# Patient Record
Sex: MALE | Race: WHITE | NOT HISPANIC OR LATINO | URBAN - METROPOLITAN AREA
[De-identification: names, ages, dates, MRNs, and addresses within clinical notes are randomized per-mention and may not be internally consistent; named-entity substitution may affect disease eponyms.]

---

## 2018-04-18 ENCOUNTER — INPATIENT (INPATIENT)
Facility: HOSPITAL | Age: 78
LOS: 7 days | Discharge: HOME | DRG: 270 | End: 2018-04-26
Attending: HOSPITALIST | Admitting: INTERNAL MEDICINE
Payer: MEDICARE

## 2018-04-18 ENCOUNTER — EMERGENCY (EMERGENCY)
Facility: HOSPITAL | Age: 78
LOS: 1 days | Discharge: ANOTHER HOSPITAL | End: 2018-04-18
Attending: EMERGENCY MEDICINE
Payer: MEDICAID

## 2018-04-18 VITALS — WEIGHT: 139.99 LBS | HEIGHT: 67 IN

## 2018-04-18 VITALS
WEIGHT: 149.91 LBS | OXYGEN SATURATION: 91 % | HEIGHT: 67 IN | RESPIRATION RATE: 18 BRPM | HEART RATE: 104 BPM | TEMPERATURE: 99 F

## 2018-04-18 VITALS
DIASTOLIC BLOOD PRESSURE: 77 MMHG | RESPIRATION RATE: 18 BRPM | SYSTOLIC BLOOD PRESSURE: 122 MMHG | HEART RATE: 112 BPM | OXYGEN SATURATION: 99 %

## 2018-04-18 DIAGNOSIS — I21.3 ST ELEVATION (STEMI) MYOCARDIAL INFARCTION OF UNSPECIFIED SITE: ICD-10-CM

## 2018-04-18 DIAGNOSIS — Z98.890 OTHER SPECIFIED POSTPROCEDURAL STATES: Chronic | ICD-10-CM

## 2018-04-18 LAB
ALBUMIN SERPL ELPH-MCNC: 3.6 G/DL — SIGNIFICANT CHANGE UP (ref 3.5–5)
ALBUMIN SERPL ELPH-MCNC: 3.8 G/DL — SIGNIFICANT CHANGE UP (ref 3.3–5)
ALP SERPL-CCNC: 61 U/L — SIGNIFICANT CHANGE UP (ref 40–120)
ALP SERPL-CCNC: 64 U/L — SIGNIFICANT CHANGE UP (ref 40–120)
ALT FLD-CCNC: 139 U/L — HIGH (ref 10–45)
ALT FLD-CCNC: 93 U/L DA — HIGH (ref 10–60)
ANION GAP SERPL CALC-SCNC: 16 MMOL/L — SIGNIFICANT CHANGE UP (ref 5–17)
ANION GAP SERPL CALC-SCNC: 8 MMOL/L — SIGNIFICANT CHANGE UP (ref 5–17)
APTT BLD: 110.4 SEC — HIGH (ref 27.5–37.4)
APTT BLD: 27.1 SEC — LOW (ref 27.5–37.4)
APTT BLD: 41.7 SEC — HIGH (ref 27.5–37.4)
AST SERPL-CCNC: 673 U/L — HIGH (ref 10–40)
AST SERPL-CCNC: 992 U/L — HIGH (ref 10–40)
BASOPHILS # BLD AUTO: 0 K/UL — SIGNIFICANT CHANGE UP (ref 0–0.2)
BASOPHILS # BLD AUTO: 0 K/UL — SIGNIFICANT CHANGE UP (ref 0–0.2)
BASOPHILS NFR BLD AUTO: 0 % — SIGNIFICANT CHANGE UP (ref 0–2)
BASOPHILS NFR BLD AUTO: 0.3 % — SIGNIFICANT CHANGE UP (ref 0–2)
BILIRUB SERPL-MCNC: 0.6 MG/DL — SIGNIFICANT CHANGE UP (ref 0.2–1.2)
BILIRUB SERPL-MCNC: 0.6 MG/DL — SIGNIFICANT CHANGE UP (ref 0.2–1.2)
BUN SERPL-MCNC: 19 MG/DL — HIGH (ref 7–18)
BUN SERPL-MCNC: 19 MG/DL — SIGNIFICANT CHANGE UP (ref 7–23)
CALCIUM SERPL-MCNC: 8.5 MG/DL — SIGNIFICANT CHANGE UP (ref 8.4–10.5)
CALCIUM SERPL-MCNC: 9 MG/DL — SIGNIFICANT CHANGE UP (ref 8.4–10.5)
CHLORIDE SERPL-SCNC: 102 MMOL/L — SIGNIFICANT CHANGE UP (ref 96–108)
CHLORIDE SERPL-SCNC: 99 MMOL/L — SIGNIFICANT CHANGE UP (ref 96–108)
CK MB CFR SERPL CALC: 777.3 NG/ML — SIGNIFICANT CHANGE UP (ref 0–3.6)
CK MB CFR SERPL CALC: >600 NG/ML — SIGNIFICANT CHANGE UP (ref 0–6.7)
CK SERPL-CCNC: 4386 U/L — SIGNIFICANT CHANGE UP (ref 35–232)
CK SERPL-CCNC: 5655 U/L — HIGH (ref 30–200)
CK SERPL-CCNC: 9145 U/L — HIGH (ref 30–200)
CO2 SERPL-SCNC: 20 MMOL/L — LOW (ref 22–31)
CO2 SERPL-SCNC: 26 MMOL/L — SIGNIFICANT CHANGE UP (ref 22–31)
CREAT SERPL-MCNC: 0.96 MG/DL — SIGNIFICANT CHANGE UP (ref 0.5–1.3)
CREAT SERPL-MCNC: 1.12 MG/DL — SIGNIFICANT CHANGE UP (ref 0.5–1.3)
EOSINOPHIL # BLD AUTO: 0 K/UL — SIGNIFICANT CHANGE UP (ref 0–0.5)
EOSINOPHIL # BLD AUTO: 0.1 K/UL — SIGNIFICANT CHANGE UP (ref 0–0.5)
EOSINOPHIL NFR BLD AUTO: 0 % — SIGNIFICANT CHANGE UP (ref 0–6)
EOSINOPHIL NFR BLD AUTO: 0 % — SIGNIFICANT CHANGE UP (ref 0–6)
GLUCOSE SERPL-MCNC: 139 MG/DL — HIGH (ref 70–99)
GLUCOSE SERPL-MCNC: 139 MG/DL — HIGH (ref 70–99)
HCT VFR BLD CALC: 48.3 % — SIGNIFICANT CHANGE UP (ref 39–50)
HCT VFR BLD CALC: 50.8 % — HIGH (ref 39–50)
HGB BLD-MCNC: 16 G/DL — SIGNIFICANT CHANGE UP (ref 13–17)
HGB BLD-MCNC: 16.1 G/DL — SIGNIFICANT CHANGE UP (ref 13–17)
INR BLD: 1.04 RATIO — SIGNIFICANT CHANGE UP (ref 0.88–1.16)
INR BLD: 1.07 RATIO — SIGNIFICANT CHANGE UP (ref 0.88–1.16)
LYMPHOCYTES # BLD AUTO: 0.8 K/UL — LOW (ref 1–3.3)
LYMPHOCYTES # BLD AUTO: 0.8 K/UL — LOW (ref 1–3.3)
LYMPHOCYTES # BLD AUTO: 5 % — LOW (ref 13–44)
LYMPHOCYTES # BLD AUTO: 5.3 % — LOW (ref 13–44)
MAGNESIUM SERPL-MCNC: 1.8 MG/DL — SIGNIFICANT CHANGE UP (ref 1.6–2.6)
MAGNESIUM SERPL-MCNC: 2.1 MG/DL — SIGNIFICANT CHANGE UP (ref 1.6–2.6)
MCHC RBC-ENTMCNC: 30.4 PG — SIGNIFICANT CHANGE UP (ref 27–34)
MCHC RBC-ENTMCNC: 31.6 GM/DL — LOW (ref 32–36)
MCHC RBC-ENTMCNC: 32.2 PG — SIGNIFICANT CHANGE UP (ref 27–34)
MCHC RBC-ENTMCNC: 33.3 GM/DL — SIGNIFICANT CHANGE UP (ref 32–36)
MCV RBC AUTO: 96.3 FL — SIGNIFICANT CHANGE UP (ref 80–100)
MCV RBC AUTO: 96.5 FL — SIGNIFICANT CHANGE UP (ref 80–100)
MONOCYTES # BLD AUTO: 0.6 K/UL — SIGNIFICANT CHANGE UP (ref 0–0.9)
MONOCYTES # BLD AUTO: 1.2 K/UL — HIGH (ref 0–0.9)
MONOCYTES NFR BLD AUTO: 3 % — SIGNIFICANT CHANGE UP (ref 2–14)
MONOCYTES NFR BLD AUTO: 4.4 % — SIGNIFICANT CHANGE UP (ref 2–14)
NEUTROPHILS # BLD AUTO: 13 K/UL — HIGH (ref 1.8–7.4)
NEUTROPHILS # BLD AUTO: 18.6 K/UL — HIGH (ref 1.8–7.4)
NEUTROPHILS NFR BLD AUTO: 90 % — HIGH (ref 43–77)
NEUTROPHILS NFR BLD AUTO: 91 % — HIGH (ref 43–77)
PHOSPHATE SERPL-MCNC: 2.6 MG/DL — SIGNIFICANT CHANGE UP (ref 2.5–4.5)
PHOSPHATE SERPL-MCNC: 3.1 MG/DL — SIGNIFICANT CHANGE UP (ref 2.5–4.5)
PLATELET # BLD AUTO: 287 K/UL — SIGNIFICANT CHANGE UP (ref 150–400)
PLATELET # BLD AUTO: 308 K/UL — SIGNIFICANT CHANGE UP (ref 150–400)
POTASSIUM SERPL-MCNC: 3.9 MMOL/L — SIGNIFICANT CHANGE UP (ref 3.5–5.3)
POTASSIUM SERPL-MCNC: 3.9 MMOL/L — SIGNIFICANT CHANGE UP (ref 3.5–5.3)
POTASSIUM SERPL-SCNC: 3.9 MMOL/L — SIGNIFICANT CHANGE UP (ref 3.5–5.3)
POTASSIUM SERPL-SCNC: 3.9 MMOL/L — SIGNIFICANT CHANGE UP (ref 3.5–5.3)
PROT SERPL-MCNC: 6.6 G/DL — SIGNIFICANT CHANGE UP (ref 6–8.3)
PROT SERPL-MCNC: 7.3 G/DL — SIGNIFICANT CHANGE UP (ref 6–8.3)
PROTHROM AB SERPL-ACNC: 11.4 SEC — SIGNIFICANT CHANGE UP (ref 9.8–12.7)
PROTHROM AB SERPL-ACNC: 11.7 SEC — SIGNIFICANT CHANGE UP (ref 9.8–12.7)
RBC # BLD: 5.01 M/UL — SIGNIFICANT CHANGE UP (ref 4.2–5.8)
RBC # BLD: 5.28 M/UL — SIGNIFICANT CHANGE UP (ref 4.2–5.8)
RBC # FLD: 12.2 % — SIGNIFICANT CHANGE UP (ref 10.3–14.5)
RBC # FLD: 12.5 % — SIGNIFICANT CHANGE UP (ref 10.3–14.5)
SODIUM SERPL-SCNC: 135 MMOL/L — SIGNIFICANT CHANGE UP (ref 135–145)
SODIUM SERPL-SCNC: 136 MMOL/L — SIGNIFICANT CHANGE UP (ref 135–145)
TROPONIN I SERPL-MCNC: 97.7 NG/ML — SIGNIFICANT CHANGE UP (ref 0–0.04)
TROPONIN T SERPL-MCNC: 12.43 NG/ML — HIGH (ref 0–0.06)
TROPONIN T SERPL-MCNC: 20.09 NG/ML — HIGH (ref 0–0.06)
WBC # BLD: 13.9 K/UL — HIGH (ref 3.8–10.5)
WBC # BLD: 20.6 K/UL — HIGH (ref 3.8–10.5)
WBC # FLD AUTO: 13.9 K/UL — HIGH (ref 3.8–10.5)
WBC # FLD AUTO: 20.6 K/UL — HIGH (ref 3.8–10.5)

## 2018-04-18 PROCEDURE — 93010 ELECTROCARDIOGRAM REPORT: CPT

## 2018-04-18 PROCEDURE — 85610 PROTHROMBIN TIME: CPT

## 2018-04-18 PROCEDURE — 99291 CRITICAL CARE FIRST HOUR: CPT

## 2018-04-18 PROCEDURE — 33967 INSERT I-AORT PERCUT DEVICE: CPT

## 2018-04-18 PROCEDURE — 82550 ASSAY OF CK (CPK): CPT

## 2018-04-18 PROCEDURE — 99152 MOD SED SAME PHYS/QHP 5/>YRS: CPT

## 2018-04-18 PROCEDURE — 85027 COMPLETE CBC AUTOMATED: CPT

## 2018-04-18 PROCEDURE — 93005 ELECTROCARDIOGRAM TRACING: CPT

## 2018-04-18 PROCEDURE — 76937 US GUIDE VASCULAR ACCESS: CPT | Mod: 26

## 2018-04-18 PROCEDURE — 92941 PRQ TRLML REVSC TOT OCCL AMI: CPT | Mod: LD

## 2018-04-18 PROCEDURE — 96374 THER/PROPH/DIAG INJ IV PUSH: CPT

## 2018-04-18 PROCEDURE — 85730 THROMBOPLASTIN TIME PARTIAL: CPT

## 2018-04-18 PROCEDURE — 82962 GLUCOSE BLOOD TEST: CPT

## 2018-04-18 PROCEDURE — 71045 X-RAY EXAM CHEST 1 VIEW: CPT

## 2018-04-18 PROCEDURE — 71045 X-RAY EXAM CHEST 1 VIEW: CPT | Mod: 26,77

## 2018-04-18 PROCEDURE — 82553 CREATINE MB FRACTION: CPT

## 2018-04-18 PROCEDURE — 99291 CRITICAL CARE FIRST HOUR: CPT | Mod: 25

## 2018-04-18 PROCEDURE — 71045 X-RAY EXAM CHEST 1 VIEW: CPT | Mod: 26

## 2018-04-18 PROCEDURE — 84484 ASSAY OF TROPONIN QUANT: CPT

## 2018-04-18 PROCEDURE — 80053 COMPREHEN METABOLIC PANEL: CPT

## 2018-04-18 PROCEDURE — 93452 LEFT HRT CATH W/VENTRCLGRPHY: CPT | Mod: 26,59

## 2018-04-18 RX ORDER — HEPARIN SODIUM 5000 [USP'U]/ML
5500 INJECTION INTRAVENOUS; SUBCUTANEOUS EVERY 6 HOURS
Qty: 0 | Refills: 0 | Status: DISCONTINUED | OUTPATIENT
Start: 2018-04-18 | End: 2018-04-19

## 2018-04-18 RX ORDER — ISOSORBIDE DINITRATE 5 MG/1
10 TABLET ORAL THREE TIMES A DAY
Qty: 0 | Refills: 0 | Status: DISCONTINUED | OUTPATIENT
Start: 2018-04-18 | End: 2018-04-19

## 2018-04-18 RX ORDER — TICAGRELOR 90 MG/1
90 TABLET ORAL
Qty: 0 | Refills: 0 | Status: DISCONTINUED | OUTPATIENT
Start: 2018-04-18 | End: 2018-04-18

## 2018-04-18 RX ORDER — ASPIRIN/CALCIUM CARB/MAGNESIUM 324 MG
81 TABLET ORAL DAILY
Qty: 0 | Refills: 0 | Status: DISCONTINUED | OUTPATIENT
Start: 2018-04-19 | End: 2018-04-26

## 2018-04-18 RX ORDER — POTASSIUM CHLORIDE 20 MEQ
20 PACKET (EA) ORAL ONCE
Qty: 0 | Refills: 0 | Status: COMPLETED | OUTPATIENT
Start: 2018-04-18 | End: 2018-04-18

## 2018-04-18 RX ORDER — CLOPIDOGREL BISULFATE 75 MG/1
300 TABLET, FILM COATED ORAL ONCE
Qty: 0 | Refills: 0 | Status: COMPLETED | OUTPATIENT
Start: 2018-04-18 | End: 2018-04-18

## 2018-04-18 RX ORDER — TICAGRELOR 90 MG/1
90 TABLET ORAL
Qty: 0 | Refills: 0 | Status: DISCONTINUED | OUTPATIENT
Start: 2018-04-19 | End: 2018-04-20

## 2018-04-18 RX ORDER — MAGNESIUM SULFATE 500 MG/ML
1 VIAL (ML) INJECTION ONCE
Qty: 0 | Refills: 0 | Status: COMPLETED | OUTPATIENT
Start: 2018-04-18 | End: 2018-04-18

## 2018-04-18 RX ORDER — ATORVASTATIN CALCIUM 80 MG/1
20 TABLET, FILM COATED ORAL AT BEDTIME
Qty: 0 | Refills: 0 | Status: DISCONTINUED | OUTPATIENT
Start: 2018-04-18 | End: 2018-04-18

## 2018-04-18 RX ORDER — NITROGLYCERIN 6.5 MG
0.5 CAPSULE, EXTENDED RELEASE ORAL ONCE
Qty: 0 | Refills: 0 | Status: DISCONTINUED | OUTPATIENT
Start: 2018-04-18 | End: 2018-04-18

## 2018-04-18 RX ORDER — HEPARIN SODIUM 5000 [USP'U]/ML
INJECTION INTRAVENOUS; SUBCUTANEOUS
Qty: 25000 | Refills: 0 | Status: DISCONTINUED | OUTPATIENT
Start: 2018-04-18 | End: 2018-04-19

## 2018-04-18 RX ORDER — ATORVASTATIN CALCIUM 80 MG/1
80 TABLET, FILM COATED ORAL AT BEDTIME
Qty: 0 | Refills: 0 | Status: DISCONTINUED | OUTPATIENT
Start: 2018-04-18 | End: 2018-04-26

## 2018-04-18 RX ORDER — ASPIRIN/CALCIUM CARB/MAGNESIUM 324 MG
325 TABLET ORAL ONCE
Qty: 0 | Refills: 0 | Status: COMPLETED | OUTPATIENT
Start: 2018-04-18 | End: 2018-04-18

## 2018-04-18 RX ORDER — HEPARIN SODIUM 5000 [USP'U]/ML
2500 INJECTION INTRAVENOUS; SUBCUTANEOUS EVERY 6 HOURS
Qty: 0 | Refills: 0 | Status: DISCONTINUED | OUTPATIENT
Start: 2018-04-18 | End: 2018-04-19

## 2018-04-18 RX ORDER — HEPARIN SODIUM 5000 [USP'U]/ML
4000 INJECTION INTRAVENOUS; SUBCUTANEOUS ONCE
Qty: 0 | Refills: 0 | Status: COMPLETED | OUTPATIENT
Start: 2018-04-18 | End: 2018-04-18

## 2018-04-18 RX ORDER — NITROGLYCERIN 6.5 MG
0.4 CAPSULE, EXTENDED RELEASE ORAL ONCE
Qty: 0 | Refills: 0 | Status: COMPLETED | OUTPATIENT
Start: 2018-04-18 | End: 2018-04-18

## 2018-04-18 RX ORDER — NITROGLYCERIN 6.5 MG
10 CAPSULE, EXTENDED RELEASE ORAL
Qty: 50 | Refills: 0 | Status: DISCONTINUED | OUTPATIENT
Start: 2018-04-18 | End: 2018-04-18

## 2018-04-18 RX ORDER — FUROSEMIDE 40 MG
20 TABLET ORAL ONCE
Qty: 0 | Refills: 0 | Status: COMPLETED | OUTPATIENT
Start: 2018-04-18 | End: 2018-04-18

## 2018-04-18 RX ORDER — ISOSORBIDE DINITRATE 5 MG/1
10 TABLET ORAL THREE TIMES A DAY
Qty: 0 | Refills: 0 | Status: DISCONTINUED | OUTPATIENT
Start: 2018-04-18 | End: 2018-04-18

## 2018-04-18 RX ADMIN — Medication 0.4 MILLIGRAM(S): at 11:39

## 2018-04-18 RX ADMIN — Medication 100 GRAM(S): at 18:20

## 2018-04-18 RX ADMIN — CLOPIDOGREL BISULFATE 300 MILLIGRAM(S): 75 TABLET, FILM COATED ORAL at 10:40

## 2018-04-18 RX ADMIN — Medication 20 MILLIEQUIVALENT(S): at 18:20

## 2018-04-18 RX ADMIN — HEPARIN SODIUM 4000 UNIT(S): 5000 INJECTION INTRAVENOUS; SUBCUTANEOUS at 10:40

## 2018-04-18 RX ADMIN — HEPARIN SODIUM 1200 UNIT(S)/HR: 5000 INJECTION INTRAVENOUS; SUBCUTANEOUS at 16:17

## 2018-04-18 RX ADMIN — Medication 325 MILLIGRAM(S): at 10:39

## 2018-04-18 RX ADMIN — HEPARIN SODIUM 1100 UNIT(S)/HR: 5000 INJECTION INTRAVENOUS; SUBCUTANEOUS at 23:56

## 2018-04-18 RX ADMIN — Medication 20 MILLIGRAM(S): at 16:16

## 2018-04-18 RX ADMIN — Medication 3 MICROGRAM(S)/MIN: at 16:16

## 2018-04-18 RX ADMIN — ATORVASTATIN CALCIUM 80 MILLIGRAM(S): 80 TABLET, FILM COATED ORAL at 23:16

## 2018-04-18 NOTE — ED PROVIDER NOTE - PROGRESS NOTE DETAILS
Spoke with cath lab nurse at [facility] immediately upon evaluation of Pt.  EKG faxed there per their request and medications ordered and initiated, ASA, Plavix, Heparin bolus.  Awaiting call back for acceptance of transfer. Pt accepted to cath lab for emergent PCI by Dr. Rees.  Discussion with cath lab nurse Paola.  Pt stable for transfer.

## 2018-04-18 NOTE — H&P ADULT - HISTORY OF PRESENT ILLNESS
76 yo M no past medical history who presents with chest pain 76 yo M no past medical history who presents with chest pain at [place] and was transferred to [facility] for STEMI.     His pain began last night around 5-6 pm. He had associated nausea earlier that day and yesterday evening. His pain was constant and began as 5/10 and progressed to 8/10. He never had this pain before. He noticed this pain had started with walking. It did not radiate and felt like pressure and throbbing. He vomited last night. He denied any palpitations. He took aspirin without relief. This pain persisted and he went to the ED at [place] this morning.     He was noted to have ST elevations from V1-V5 with mild elevations in I/avL. He was given ASA and plavix 300 with heparin bolus. He was then transferred to [facility] for cath. He had his cardiac cath performed with 100% proximal LAD s/p KYRA x1/POBA, ramus was 90% occluded and D1 was 90%. He was noted to be hypotensive to 79 SBP and had an IABP placed. LVEDP 28. He appeared to have decreased EF on ventriculogram.     On speaking with patient he was on 2 L NC. He still had 7/10 chest pain and felt short of breath. He had edema noted on repeat CXR.

## 2018-04-18 NOTE — H&P ADULT - NSHPSOCIALHISTORY_GEN_ALL_CORE
Denied any recent tobacco use. Previously smoked in college but quit as he participated in track. Drinks 2-3 glasses of wine/beer every other day. Denies any illcit drug use or second hand smoke. Works as an actor.

## 2018-04-18 NOTE — ED PROVIDER NOTE - CONDUCTED A DETAILED DISCUSSION WITH PATIENT AND/OR GUARDIAN REGARDING, MDM
radiology results/need for outpatient follow-up/need for transfer/lab results radiology results/lab results/need for transfer

## 2018-04-18 NOTE — CHART NOTE - NSCHARTNOTEFT_GEN_A_CORE
====================  CCU MIDNIGHT ROUNDS  ====================    LUNA COOPER  [id number]    ====================  SUMMARY: HPI:  78 yo M no past medical history who presents with chest pain at [place] and was transferred to [facility] for STEMI.     His pain began last night around 5-6 pm. He had associated nausea earlier that day and yesterday evening. His pain was constant and began as 5/10 and progressed to 8/10. He never had this pain before. He noticed this pain had started with walking. It did not radiate and felt like pressure and throbbing. He vomited last night. He denied any palpitations. He took aspirin without relief. This pain persisted and he went to the ED at [place] this morning.     He was noted to have ST elevations from V1-V5 with mild elevations in I/avL. He was given ASA and plavix 300 with heparin bolus. He was then transferred to [facility] for cath. He had his cardiac cath performed with 100% proximal LAD s/p KYRA x1/POBA, ramus was 90% occluded and D1 was 90%. He was noted to be hypotensive to 79 SBP and had an IABP placed. LVEDP 28. He appeared to have decreased EF on ventriculogram.     On speaking with patient he was on 2 L NC. He still had 7/10 chest pain and felt short of breath. He had edema noted on repeat CXR. (18 Apr 2018 15:15)    ====================        ====================  NEW EVENTS: Nitro gtt weened off.  ====================        ====================  VITALS (Last 12 hrs):  ====================    T(C): 37.8 (04-18-18 @ 19:00), Max: 37.8 (04-18-18 @ 19:00)  HR: 110 (04-18-18 @ 22:00) (102 - 114)  BP: 100/61 (04-18-18 @ 19:00) (100/61 - 100/61)  BP(mean): 77 (04-18-18 @ 19:00) (77 - 77)  RR: 21 (04-18-18 @ 22:00) (16 - 21)  SpO2: 95% (04-18-18 @ 22:00) (91% - 97%)      TELEMETRY: sinus tach        I&O's Summary    18 Apr 2018 07:01  -  18 Apr 2018 23:10  --------------------------------------------------------  IN: 177 mL / OUT: 525 mL / NET: -348 mL        ====================  PLAN:  #cardiac: STEMI s/p PCI to LAD with IABP placement. IABP on 1:1 augmenting high 90s low 100s. On heparin gtt, DAPT, statin, isordil for afterload reduction. Nitro gtt has been weened off and pt c/o minimal chest discomfort. Will need stage eventually.  ====================    HEALTH ISSUES - PROBLEM Dx:        Teri Russell, CCU PA #[id number]

## 2018-04-18 NOTE — H&P ADULT - NSHPPHYSICALEXAM_GEN_ALL_CORE
ICU Vital Signs Last 24 Hrs  T(C): 37.1 (18 Apr 2018 13:30), Max: 37.1 (18 Apr 2018 10:25)  T(F): 98.7 (18 Apr 2018 13:30), Max: 98.8 (18 Apr 2018 10:25)  HR: 114 (18 Apr 2018 16:00) (102 - 114)  BP: 122/77 (18 Apr 2018 10:47) (122/77 - 132/84)  BP(mean): --  ABP: --  ABP(mean): --  RR: 18 (18 Apr 2018 13:30) (17 - 18)  SpO2: 96% (18 Apr 2018 16:00) (91% - 99%) ICU Vital Signs Last 24 Hrs  T(C): 37.1 (18 Apr 2018 13:30), Max: 37.1 (18 Apr 2018 10:25)  T(F): 98.7 (18 Apr 2018 13:30), Max: 98.8 (18 Apr 2018 10:25)  HR: 114 (18 Apr 2018 16:00) (102 - 114)  BP: 122/77 (18 Apr 2018 10:47) (122/77 - 132/84)  BP(mean): --  ABP: --  ABP(mean): --  RR: 18 (18 Apr 2018 13:30) (17 - 18)  SpO2: 96% (18 Apr 2018 16:00) (91% - 99%)    General: NAD, resting comfortably in bed  Neck: No appreciable JVD  Lung: CTAB in anterior lung fields,  CV: Tachycardic, physiologic split S2, no murmurs, gallops or rubs  GI: Soft, NT, ND, no guarding or rigidity  : No glass  Ext: R groin intact with minimal bleeding surrounding entry site of IABP  Vasc: Pulses noted on R lower extremity  Neuro: Alert, awake, appropriate  Psych: Normal affect

## 2018-04-18 NOTE — ED ADULT NURSE NOTE - CHPI ED SYMPTOMS NEG
no fever/no shortness of breath/no back pain/no vomiting/no nausea/no diaphoresis/no cough/no chills/no syncope/no dizziness

## 2018-04-18 NOTE — ED PROVIDER NOTE - OBJECTIVE STATEMENT
78 y/o M pt with no known PMHx and no known PSHx presents to ED c/o CP since last night (yesterday). Pt describes CP as tightness, and non-radiating; CP does not radiate to the back. Per pt, pt believes he has HTN, but does not take any medication for it. Pt denies b/l leg swelling, or any other complaints. Pt also denies having a PMD that he follows up with regularly, or taking any medications daily. NKDA. 78 y/o M pt with no known PMHx and no known PSHx presents to ED c/o CP since last night (yesterday). Pt describes CP as tightness, and non-radiating; CP does not radiate to the back. Per pt, pt believes he has HTN, but does not take any medication for it. Pt denies b/l leg swelling, or any other complaints. Pt also denies having a PMD that he follows up with regularly, or taking any medications daily. Denies smoking/drug use.  NKDA.

## 2018-04-18 NOTE — ED PROVIDER NOTE - MEDICAL DECISION MAKING DETAILS
76 y/o M pt c/o chest pain since last night. No apparent distress. EKG with signs of STEMI. Will initiate STAT transfer to [facility] Cath Lab.

## 2018-04-18 NOTE — H&P ADULT - NSHPLABSRESULTS_GEN_ALL_CORE
16.1   20.6  )-----------( 287      ( 18 Apr 2018 16:15 )             48.3     CBC Full  -  ( 18 Apr 2018 16:15 )  WBC Count : 20.6 K/uL  Hemoglobin : 16.1 g/dL  Hematocrit : 48.3 %  Platelet Count - Automated : 287 K/uL  Mean Cell Volume : 96.5 fl  Mean Cell Hemoglobin : 32.2 pg  Mean Cell Hemoglobin Concentration : 33.3 gm/dL  Auto Neutrophil # : 18.6 K/uL  Auto Lymphocyte # : 0.8 K/uL  Auto Monocyte # : 1.2 K/uL  Auto Eosinophil # : 0.1 K/uL  Auto Basophil # : 0.0 K/uL  Auto Neutrophil % : 91.0 %  Auto Lymphocyte % : 5.0 %  Auto Monocyte % : 3.0 %  Auto Eosinophil % : 0.0 %  Auto Basophil % : 0.0 %    04-18    135  |  99  |  19  ----------------------------<  139<H>  3.9   |  20<L>  |  0.96    Ca    9.0      18 Apr 2018 16:15  Phos  3.1     04-18  Mg     1.8     04-18    TPro  6.6  /  Alb  3.8  /  TBili  0.6  /  DBili  x   /  AST  992<H>  /  ALT  139<H>  /  AlkPhos  61  04-18    PT/INR - ( 18 Apr 2018 16:15 )   PT: 11.7 sec;   INR: 1.07 ratio         PTT - ( 18 Apr 2018 16:15 )  PTT:41.7 sec  CARDIAC MARKERS ( 18 Apr 2018 16:15 )  x     / 20.09 ng/mL / 9145 U/L / x     / >600 ng/mL  CARDIAC MARKERS ( 18 Apr 2018 10:35 )  97.7 ng/mL / x     / 4386 U/L / x     / 777.3 ng/mL

## 2018-04-18 NOTE — H&P ADULT - NSHPREVIEWOFSYSTEMS_GEN_ALL_CORE
General: Denied fevers or chills  Lungs: Shortness of breath  CV: Please see above  GI: No abdominal pain, nausea/vomiting resolved, no diarrhea/constipation  : No urinary difficulties  Ext: No lower extremity swelling  Skin: No rashes

## 2018-04-19 LAB
ALBUMIN SERPL ELPH-MCNC: 3.3 G/DL — SIGNIFICANT CHANGE UP (ref 3.3–5)
ALP SERPL-CCNC: 48 U/L — SIGNIFICANT CHANGE UP (ref 40–120)
ALT FLD-CCNC: 107 U/L — HIGH (ref 10–45)
ANION GAP SERPL CALC-SCNC: 13 MMOL/L — SIGNIFICANT CHANGE UP (ref 5–17)
APTT BLD: 124.2 SEC — CRITICAL HIGH (ref 27.5–37.4)
AST SERPL-CCNC: 603 U/L — HIGH (ref 10–40)
BASOPHILS # BLD AUTO: 0 K/UL — SIGNIFICANT CHANGE UP (ref 0–0.2)
BASOPHILS # BLD AUTO: 0 K/UL — SIGNIFICANT CHANGE UP (ref 0–0.2)
BASOPHILS NFR BLD AUTO: 0 % — SIGNIFICANT CHANGE UP (ref 0–2)
BASOPHILS NFR BLD AUTO: 0.1 % — SIGNIFICANT CHANGE UP (ref 0–2)
BILIRUB SERPL-MCNC: 0.8 MG/DL — SIGNIFICANT CHANGE UP (ref 0.2–1.2)
BUN SERPL-MCNC: 25 MG/DL — HIGH (ref 7–23)
CALCIUM SERPL-MCNC: 8.5 MG/DL — SIGNIFICANT CHANGE UP (ref 8.4–10.5)
CHLORIDE SERPL-SCNC: 99 MMOL/L — SIGNIFICANT CHANGE UP (ref 96–108)
CK MB BLD-MCNC: 5.5 % — HIGH (ref 0–3.5)
CK MB BLD-MCNC: 7.8 % — HIGH (ref 0–3.5)
CK MB CFR SERPL CALC: 219.4 NG/ML — HIGH (ref 0–6.7)
CK MB CFR SERPL CALC: 443.6 NG/ML — HIGH (ref 0–6.7)
CK SERPL-CCNC: 3971 U/L — HIGH (ref 30–200)
CO2 SERPL-SCNC: 23 MMOL/L — SIGNIFICANT CHANGE UP (ref 22–31)
CREAT SERPL-MCNC: 1.34 MG/DL — HIGH (ref 0.5–1.3)
EOSINOPHIL # BLD AUTO: 0 K/UL — SIGNIFICANT CHANGE UP (ref 0–0.5)
EOSINOPHIL # BLD AUTO: 0 K/UL — SIGNIFICANT CHANGE UP (ref 0–0.5)
EOSINOPHIL NFR BLD AUTO: 0.1 % — SIGNIFICANT CHANGE UP (ref 0–6)
EOSINOPHIL NFR BLD AUTO: 0.1 % — SIGNIFICANT CHANGE UP (ref 0–6)
GLUCOSE SERPL-MCNC: 153 MG/DL — HIGH (ref 70–99)
HCT VFR BLD CALC: 41.8 % — SIGNIFICANT CHANGE UP (ref 39–50)
HCT VFR BLD CALC: 44.9 % — SIGNIFICANT CHANGE UP (ref 39–50)
HGB BLD-MCNC: 14.2 G/DL — SIGNIFICANT CHANGE UP (ref 13–17)
HGB BLD-MCNC: 15 G/DL — SIGNIFICANT CHANGE UP (ref 13–17)
INR BLD: 1.36 RATIO — HIGH (ref 0.88–1.16)
LYMPHOCYTES # BLD AUTO: 0.6 K/UL — LOW (ref 1–3.3)
LYMPHOCYTES # BLD AUTO: 0.8 K/UL — LOW (ref 1–3.3)
LYMPHOCYTES # BLD AUTO: 3.2 % — LOW (ref 13–44)
LYMPHOCYTES # BLD AUTO: 4.9 % — LOW (ref 13–44)
MAGNESIUM SERPL-MCNC: 2.2 MG/DL — SIGNIFICANT CHANGE UP (ref 1.6–2.6)
MCHC RBC-ENTMCNC: 31.8 PG — SIGNIFICANT CHANGE UP (ref 27–34)
MCHC RBC-ENTMCNC: 32.5 PG — SIGNIFICANT CHANGE UP (ref 27–34)
MCHC RBC-ENTMCNC: 33.3 GM/DL — SIGNIFICANT CHANGE UP (ref 32–36)
MCHC RBC-ENTMCNC: 34 GM/DL — SIGNIFICANT CHANGE UP (ref 32–36)
MCV RBC AUTO: 95.4 FL — SIGNIFICANT CHANGE UP (ref 80–100)
MCV RBC AUTO: 95.6 FL — SIGNIFICANT CHANGE UP (ref 80–100)
MONOCYTES # BLD AUTO: 0.9 K/UL — SIGNIFICANT CHANGE UP (ref 0–0.9)
MONOCYTES # BLD AUTO: 1.1 K/UL — HIGH (ref 0–0.9)
MONOCYTES NFR BLD AUTO: 5.1 % — SIGNIFICANT CHANGE UP (ref 2–14)
MONOCYTES NFR BLD AUTO: 6.6 % — SIGNIFICANT CHANGE UP (ref 2–14)
NEUTROPHILS # BLD AUTO: 15 K/UL — HIGH (ref 1.8–7.4)
NEUTROPHILS # BLD AUTO: 16.8 K/UL — HIGH (ref 1.8–7.4)
NEUTROPHILS NFR BLD AUTO: 88.4 % — HIGH (ref 43–77)
NEUTROPHILS NFR BLD AUTO: 91.4 % — HIGH (ref 43–77)
PHOSPHATE SERPL-MCNC: 2.9 MG/DL — SIGNIFICANT CHANGE UP (ref 2.5–4.5)
PLATELET # BLD AUTO: 216 K/UL — SIGNIFICANT CHANGE UP (ref 150–400)
PLATELET # BLD AUTO: 255 K/UL — SIGNIFICANT CHANGE UP (ref 150–400)
POTASSIUM SERPL-MCNC: 3.9 MMOL/L — SIGNIFICANT CHANGE UP (ref 3.5–5.3)
POTASSIUM SERPL-SCNC: 3.9 MMOL/L — SIGNIFICANT CHANGE UP (ref 3.5–5.3)
PROT SERPL-MCNC: 6.2 G/DL — SIGNIFICANT CHANGE UP (ref 6–8.3)
PROTHROM AB SERPL-ACNC: 14.9 SEC — HIGH (ref 9.8–12.7)
RBC # BLD: 4.38 M/UL — SIGNIFICANT CHANGE UP (ref 4.2–5.8)
RBC # BLD: 4.71 M/UL — SIGNIFICANT CHANGE UP (ref 4.2–5.8)
RBC # FLD: 12.1 % — SIGNIFICANT CHANGE UP (ref 10.3–14.5)
RBC # FLD: 12.1 % — SIGNIFICANT CHANGE UP (ref 10.3–14.5)
SODIUM SERPL-SCNC: 135 MMOL/L — SIGNIFICANT CHANGE UP (ref 135–145)
TROPONIN T SERPL-MCNC: 9.82 NG/ML — HIGH (ref 0–0.06)
WBC # BLD: 17 K/UL — HIGH (ref 3.8–10.5)
WBC # BLD: 18.4 K/UL — HIGH (ref 3.8–10.5)
WBC # FLD AUTO: 17 K/UL — HIGH (ref 3.8–10.5)
WBC # FLD AUTO: 18.4 K/UL — HIGH (ref 3.8–10.5)

## 2018-04-19 PROCEDURE — 71045 X-RAY EXAM CHEST 1 VIEW: CPT | Mod: 26

## 2018-04-19 PROCEDURE — 93010 ELECTROCARDIOGRAM REPORT: CPT

## 2018-04-19 PROCEDURE — 92928 PRQ TCAT PLMT NTRAC ST 1 LES: CPT | Mod: LD

## 2018-04-19 PROCEDURE — 93306 TTE W/DOPPLER COMPLETE: CPT | Mod: 26

## 2018-04-19 PROCEDURE — 99152 MOD SED SAME PHYS/QHP 5/>YRS: CPT

## 2018-04-19 RX ORDER — HEPARIN SODIUM 5000 [USP'U]/ML
INJECTION INTRAVENOUS; SUBCUTANEOUS
Qty: 25000 | Refills: 0 | Status: DISCONTINUED | OUTPATIENT
Start: 2018-04-19 | End: 2018-04-25

## 2018-04-19 RX ORDER — METOPROLOL TARTRATE 50 MG
12.5 TABLET ORAL EVERY 12 HOURS
Qty: 0 | Refills: 0 | Status: DISCONTINUED | OUTPATIENT
Start: 2018-04-19 | End: 2018-04-20

## 2018-04-19 RX ORDER — HEPARIN SODIUM 5000 [USP'U]/ML
2500 INJECTION INTRAVENOUS; SUBCUTANEOUS EVERY 6 HOURS
Qty: 0 | Refills: 0 | Status: DISCONTINUED | OUTPATIENT
Start: 2018-04-19 | End: 2018-04-25

## 2018-04-19 RX ORDER — POTASSIUM CHLORIDE 20 MEQ
20 PACKET (EA) ORAL ONCE
Qty: 0 | Refills: 0 | Status: COMPLETED | OUTPATIENT
Start: 2018-04-19 | End: 2018-04-19

## 2018-04-19 RX ORDER — HEPARIN SODIUM 5000 [USP'U]/ML
5500 INJECTION INTRAVENOUS; SUBCUTANEOUS EVERY 6 HOURS
Qty: 0 | Refills: 0 | Status: DISCONTINUED | OUTPATIENT
Start: 2018-04-19 | End: 2018-04-25

## 2018-04-19 RX ADMIN — ISOSORBIDE DINITRATE 10 MILLIGRAM(S): 5 TABLET ORAL at 08:16

## 2018-04-19 RX ADMIN — TICAGRELOR 90 MILLIGRAM(S): 90 TABLET ORAL at 06:36

## 2018-04-19 RX ADMIN — ISOSORBIDE DINITRATE 10 MILLIGRAM(S): 5 TABLET ORAL at 14:45

## 2018-04-19 RX ADMIN — HEPARIN SODIUM 1000 UNIT(S)/HR: 5000 INJECTION INTRAVENOUS; SUBCUTANEOUS at 06:36

## 2018-04-19 RX ADMIN — Medication 81 MILLIGRAM(S): at 11:07

## 2018-04-19 RX ADMIN — HEPARIN SODIUM 1200 UNIT(S)/HR: 5000 INJECTION INTRAVENOUS; SUBCUTANEOUS at 21:38

## 2018-04-19 RX ADMIN — Medication 20 MILLIEQUIVALENT(S): at 08:18

## 2018-04-19 RX ADMIN — TICAGRELOR 90 MILLIGRAM(S): 90 TABLET ORAL at 18:42

## 2018-04-19 RX ADMIN — ATORVASTATIN CALCIUM 80 MILLIGRAM(S): 80 TABLET, FILM COATED ORAL at 21:38

## 2018-04-19 NOTE — CHART NOTE - NSCHARTNOTEFT_GEN_A_CORE
====================  CCU MIDNIGHT ROUNDS  ====================    LUNA COOPER  [id number]    ====================  SUMMARY: HPI:  76 yo M no past medical history who presents with chest pain at [place] and was transferred to [facility] for STEMI.     His pain began last night around 5-6 pm. He had associated nausea earlier that day and yesterday evening. His pain was constant and began as 5/10 and progressed to 8/10. He never had this pain before. He noticed this pain had started with walking. It did not radiate and felt like pressure and throbbing. He vomited last night. He denied any palpitations. He took aspirin without relief. This pain persisted and he went to the ED at [place] this morning.     He was noted to have ST elevations from V1-V5 with mild elevations in I/avL. He was given ASA and plavix 300 with heparin bolus. He was then transferred to [facility] for cath. He had his cardiac cath performed with 100% proximal LAD s/p KYRA x1/POBA, ramus was 90% occluded and D1 was 90%. He was noted to be hypotensive to 79 SBP and had an IABP placed. LVEDP 28. He appeared to have decreased EF on ventriculogram.     On speaking with patient he was on 2 L NC. He still had 7/10 chest pain and felt short of breath. He had edema noted on repeat CXR. (18 Apr 2018 15:15)    ====================        ====================  NEW EVENTS:  ====================        ====================  VITALS (Last 12 hrs):  ====================    T(C): 37.3 (04-19-18 @ 20:00), Max: 37.3 (04-19-18 @ 20:00)  HR: 102 (04-19-18 @ 23:00) (98 - 112)  BP: 80/42 (04-19-18 @ 23:00) (80/42 - 98/57)  BP(mean): 55 (04-19-18 @ 23:00) (55 - 83)  ABP: --  ABP(mean): --  RR: 14 (04-19-18 @ 22:00) (13 - 24)  SpO2: 98% (04-19-18 @ 22:00) (94% - 98%)  Wt(kg): --  CVP(mm Hg): --  CO: --  CI: --  PA: --  PA(mean): --  PA(direct): --  PCWP: --  SVR: --    TELEMETRY:        *BLOOD GAS/ARTERIAL/MIXED/VENOUS  *LACTATE    I&O's Summary    18 Apr 2018 07:01  -  19 Apr 2018 07:00  --------------------------------------------------------  IN: 528 mL / OUT: 675 mL / NET: -147 mL    19 Apr 2018 07:01  -  19 Apr 2018 23:54  --------------------------------------------------------  IN: 664 mL / OUT: 900 mL / NET: -236 mL        ====================  PLAN:  ====================    HEALTH ISSUES - PROBLEM Dx:        HEALTH ISSUES - R/O PROBLEM Dx:      DAISHA Rivera PA #[id number] ====================  CCU MIDNIGHT ROUNDS  ====================    LUNA COOPER  [id number]    ====================  SUMMARY: HPI:  76 yo M no past medical history who presents with chest pain at [place] and was transferred to [facility] for STEMI.     His pain began last night around 5-6 pm. He had associated nausea earlier that day and yesterday evening. His pain was constant and began as 5/10 and progressed to 8/10. He never had this pain before. He noticed this pain had started with walking. It did not radiate and felt like pressure and throbbing. He vomited last night. He denied any palpitations. He took aspirin without relief. This pain persisted and he went to the ED at [place] this morning.     He was noted to have ST elevations from V1-V5 with mild elevations in I/avL. He was given ASA and plavix 300 with heparin bolus. He was then transferred to [facility] for cath. He had his cardiac cath performed with 100% proximal LAD s/p KYRA x1/POBA, ramus was 90% occluded and D1 was 90%. He was noted to be hypotensive to 79 SBP and had an IABP placed. LVEDP 28. He appeared to have decreased EF on ventriculogram.     On speaking with patient he was on 2 L NC. He still had 7/10 chest pain and felt short of breath. He had edema noted on repeat CXR. (18 Apr 2018 15:15)    ====================        ====================  NEW EVENTS: Will give 250cc fluid slowly for BP 70/50s. Baseline is on softer side, IABP was augmenting in mid 90s pre removal. Will f/u CBC, groin site is without hematoma.   ====================        ====================  VITALS (Last 12 hrs):  ====================    T(C): 37.3 (04-19-18 @ 20:00), Max: 37.3 (04-19-18 @ 20:00)  HR: 102 (04-19-18 @ 23:00) (98 - 112)  BP: 80/42 (04-19-18 @ 23:00) (80/42 - 98/57)  BP(mean): 55 (04-19-18 @ 23:00) (55 - 83)  RR: 14 (04-19-18 @ 22:00) (13 - 24)  SpO2: 98% (04-19-18 @ 22:00) (94% - 98%)        TELEMETRY: sinus          I&O's Summary    18 Apr 2018 07:01  -  19 Apr 2018 07:00  --------------------------------------------------------  IN: 528 mL / OUT: 675 mL / NET: -147 mL    19 Apr 2018 07:01  -  19 Apr 2018 23:54  --------------------------------------------------------  IN: 664 mL / OUT: 900 mL / NET: -236 mL        ====================  PLAN:  #cardiac: STEMI s/p PCI to LAD with IABP placement. IABP removed today and staged PCI to D1. TTE done today, LV thrombus and severe LV dysfxn. On heparin gtt, DAPT, statin, low dose BB, isordil for afterload reduction. Will need ACE when tolerated. Will need eventual coumadin for LV thrombus and will need to be on plavix due to triple therapy. Monitor R groin.  ====================    HEALTH ISSUES - PROBLEM Dx:          Teri Russell, CCU PA #[id number]

## 2018-04-19 NOTE — PROGRESS NOTE ADULT - ASSESSMENT
76 yo M no pmhx presents with chest pain at [facility] transferred to [facility] in setting of late presentation STEMI s/p KYRA prox LAD 99%, with D1 and ramus 90% w/ IABP placed at cath lab for hypotension. Presents to the floor with possible acute decompensated systolic heart failure (LVEDP 28 and estimated EF 25%) and persistent chest pain.     # Late presentation anterior STEMI s/p KYRA pLAD with remaining D1 and ramus 90%  - Continue ASA and brilinta   - Staged PCI today  - TTE with evidence of LV thrombus, continue heparin  - Nuha downtrending, continue to trend post repeat PCI  - Continue IABP, repeat x-ray after PCI today  - Now off nitro gtt, continue ISDN 10 TID for anginal pain, reassess after PCI    # Acute decompensated heart failure: satting 94% on 2 L NC. EF 20-25% on TTE  - LVEDP 28 and estimated EF 25%  - S/p lasix 20 x1 dose, overall net negative 1 L.  - Continue to monitor I/Os closely, continue to keep net negative    # DEMARCUS possibly in setting of lasix dose, vs contrast vs decreased renal perfusion  - Continue to monitor creatinine and UOP  - Avoid nephrotoxic agents as possible    # Leukocytosis: Downtrending  - Likely elevated in setting of stress response. No evidence of fever.     # Elevated LFTs:  - Likely in setting of STEMI, continue to trend and monitor    Lizabeth Barnett MD  Internal Medicine, PGY-2  Pager [phone]

## 2018-04-19 NOTE — CHART NOTE - NSCHARTNOTEFT_GEN_A_CORE
Removal of IABP    Pulses in the right lower extremity are audible by doppler. The patient was placed in the supine position. The insertion site was identified and the sutures were removed per protocol.  The 8 [place] femoral sheath was then removed. Direct pressure was applied for  30 minutes.     Monitoring of the right groin and both lower extremities including neuro-vascular checks and vital signs every 15 minutes x 4, then every 30 minutes x 2, then every 1 hour was ordered.    Complications: None/Other

## 2018-04-19 NOTE — PROGRESS NOTE ADULT - SUBJECTIVE AND OBJECTIVE BOX
HPI / INTERVAL HISTORY:  Patient seen and examined at bedside.      OBJECTIVE:  VITAL SIGNS:  ICU Vital Signs Last 24 Hrs  T(C): 36.9 (19 Apr 2018 06:20), Max: 37.8 (18 Apr 2018 19:00)  T(F): 98.4 (19 Apr 2018 06:20), Max: 100 (18 Apr 2018 19:00)  HR: 102 (19 Apr 2018 08:00) (102 - 114)  BP: 92/69 (19 Apr 2018 07:48) (92/69 - 132/84)  BP(mean): 75 (19 Apr 2018 07:48) (75 - 77)  ABP: --  ABP(mean): --  RR: 17 (19 Apr 2018 08:00) (13 - 21)  SpO2: 96% (19 Apr 2018 08:00) (91% - 99%)        04-18 @ 07:01  -  04-19 @ 07:00  --------------------------------------------------------  IN: 528 mL / OUT: 675 mL / NET: -147 mL    04-19 @ 07:01  -  04-19 @ 09:06  --------------------------------------------------------  IN: 250 mL / OUT: 0 mL / NET: 250 mL      CAPILLARY BLOOD GLUCOSE      POCT Blood Glucose.: 136 mg/dL (18 Apr 2018 10:15)      PHYSICAL EXAM:  Gen:   HEENT: NC/AT; PERRL, anicteric sclera  Neck: supple, no JVD  Resp: clear to ausculation B/L; no wheezes, rales or rhonchi  Cardiovasc: S1S2 normal; RRR; no murmurs, rubs or gallops  GI: soft, nondistended, nontender; +BS  Extr: warm, well-perfused, PT/DP pulses 2+ B/L; no LE edema  Skin: normal color and turgor  Neuro:     LABS:                        14.2   17.0  )-----------( 216      ( 19 Apr 2018 05:23 )             41.8     04-19    135  |  99  |  25<H>  ----------------------------<  153<H>  3.9   |  23  |  1.34<H>    Ca    8.5      19 Apr 2018 05:23  Phos  2.9     04-19  Mg     2.2     04-19    TPro  6.2  /  Alb  3.3  /  TBili  0.8  /  DBili  x   /  AST  603<H>  /  ALT  107<H>  /  AlkPhos  48  04-19    LIVER FUNCTIONS - ( 19 Apr 2018 05:23 )  Alb: 3.3 g/dL / Pro: 6.2 g/dL / ALK PHOS: 48 U/L / ALT: 107 U/L / AST: 603 U/L / GGT: x           PT/INR - ( 19 Apr 2018 05:23 )   PT: 14.9 sec;   INR: 1.36 ratio         PTT - ( 19 Apr 2018 05:23 )  PTT:124.2 sec    CARDIAC MARKERS ( 19 Apr 2018 05:23 )  x     / 9.82 ng/mL / 3971 U/L / x     / 219.4 ng/mL  CARDIAC MARKERS ( 18 Apr 2018 23:19 )  x     / 12.43 ng/mL / 5655 U/L / x     / 443.6 ng/mL  CARDIAC MARKERS ( 18 Apr 2018 16:15 )  x     / 20.09 ng/mL / 9145 U/L / x     / >600 ng/mL  CARDIAC MARKERS ( 18 Apr 2018 10:35 )  97.7 ng/mL / x     / 4386 U/L / x     / 777.3 ng/mL          RADIOLOGY & ADDITIONAL TESTS:       MEDICATIONS:  aspirin  chewable 81 milliGRAM(s) Chew daily  atorvastatin 80 milliGRAM(s) Oral at bedtime  heparin  Infusion.  Unit(s)/Hr IV Continuous <Continuous>  heparin  Injectable 5500 Unit(s) IV Push every 6 hours PRN  heparin  Injectable 2500 Unit(s) IV Push every 6 hours PRN  isosorbide   dinitrate Tablet (ISORDIL) 10 milliGRAM(s) Oral three times a day  ticagrelor 90 milliGRAM(s) Oral two times a day      ALLERGIES:  No Known Allergies HPI / INTERVAL HISTORY:  Patient seen and examined at bedside. Was taken off of nitro overnight. IABP positioned low. Reports UOP o/n. No chest pain or shortness of breath.       OBJECTIVE:  VITAL SIGNS:  ICU Vital Signs Last 24 Hrs  T(C): 36.9 (19 Apr 2018 06:20), Max: 37.8 (18 Apr 2018 19:00)  T(F): 98.4 (19 Apr 2018 06:20), Max: 100 (18 Apr 2018 19:00)  HR: 102 (19 Apr 2018 08:00) (102 - 114)  BP: 92/69 (19 Apr 2018 07:48) (92/69 - 132/84)  BP(mean): 75 (19 Apr 2018 07:48) (75 - 77)  ABP: --  ABP(mean): --  RR: 17 (19 Apr 2018 08:00) (13 - 21)  SpO2: 96% (19 Apr 2018 08:00) (91% - 99%)        04-18 @ 07:01  -  04-19 @ 07:00  --------------------------------------------------------  IN: 528 mL / OUT: 675 mL / NET: -147 mL    04-19 @ 07:01  -  04-19 @ 09:06  --------------------------------------------------------  IN: 250 mL / OUT: 0 mL / NET: 250 mL      CAPILLARY BLOOD GLUCOSE      POCT Blood Glucose.: 136 mg/dL (18 Apr 2018 10:15)      PHYSICAL EXAM:  Gen: NAD, resting comfortably in bed  HEENT: NC/AT;  Resp: clear to ausculation B/L in anterior lung fields; no wheezes, rales or rhonchi  Cardiovasc: S1S2 normal; RRR; possible physiologic split S2, however tachycardic, no murmurs, rubs or gallops  GI: soft, nondistended, nontender; +BS, no guarding or rigidity  Extr: warm, well-perfused, no LE edema  Skin: normal color and turgor  Groin: R IABP site with mild blood around catheter entrance, no tenderness or hematoma    Neuro: Appropriate    LABS:                        14.2   17.0  )-----------( 216      ( 19 Apr 2018 05:23 )             41.8     04-19    135  |  99  |  25<H>  ----------------------------<  153<H>  3.9   |  23  |  1.34<H>    Ca    8.5      19 Apr 2018 05:23  Phos  2.9     04-19  Mg     2.2     04-19    TPro  6.2  /  Alb  3.3  /  TBili  0.8  /  DBili  x   /  AST  603<H>  /  ALT  107<H>  /  AlkPhos  48  04-19    LIVER FUNCTIONS - ( 19 Apr 2018 05:23 )  Alb: 3.3 g/dL / Pro: 6.2 g/dL / ALK PHOS: 48 U/L / ALT: 107 U/L / AST: 603 U/L / GGT: x           PT/INR - ( 19 Apr 2018 05:23 )   PT: 14.9 sec;   INR: 1.36 ratio         PTT - ( 19 Apr 2018 05:23 )  PTT:124.2 sec    CARDIAC MARKERS ( 19 Apr 2018 05:23 )  x     / 9.82 ng/mL / 3971 U/L / x     / 219.4 ng/mL  CARDIAC MARKERS ( 18 Apr 2018 23:19 )  x     / 12.43 ng/mL / 5655 U/L / x     / 443.6 ng/mL  CARDIAC MARKERS ( 18 Apr 2018 16:15 )  x     / 20.09 ng/mL / 9145 U/L / x     / >600 ng/mL  CARDIAC MARKERS ( 18 Apr 2018 10:35 )  97.7 ng/mL / x     / 4386 U/L / x     / 777.3 ng/mL          RADIOLOGY & ADDITIONAL TESTS:       MEDICATIONS:  aspirin  chewable 81 milliGRAM(s) Chew daily  atorvastatin 80 milliGRAM(s) Oral at bedtime  heparin  Infusion.  Unit(s)/Hr IV Continuous <Continuous>  heparin  Injectable 5500 Unit(s) IV Push every 6 hours PRN  heparin  Injectable 2500 Unit(s) IV Push every 6 hours PRN  isosorbide   dinitrate Tablet (ISORDIL) 10 milliGRAM(s) Oral three times a day  ticagrelor 90 milliGRAM(s) Oral two times a day      ALLERGIES:  No Known Allergies

## 2018-04-20 LAB
ALBUMIN SERPL ELPH-MCNC: 2.7 G/DL — LOW (ref 3.3–5)
ALP SERPL-CCNC: 51 U/L — SIGNIFICANT CHANGE UP (ref 40–120)
ALT FLD-CCNC: 74 U/L — HIGH (ref 10–45)
ANION GAP SERPL CALC-SCNC: 12 MMOL/L — SIGNIFICANT CHANGE UP (ref 5–17)
APTT BLD: 56.9 SEC — HIGH (ref 27.5–37.4)
APTT BLD: 72.7 SEC — HIGH (ref 27.5–37.4)
APTT BLD: 74.7 SEC — HIGH (ref 27.5–37.4)
AST SERPL-CCNC: 268 U/L — HIGH (ref 10–40)
BILIRUB SERPL-MCNC: 0.8 MG/DL — SIGNIFICANT CHANGE UP (ref 0.2–1.2)
BUN SERPL-MCNC: 20 MG/DL — SIGNIFICANT CHANGE UP (ref 7–23)
CALCIUM SERPL-MCNC: 8.5 MG/DL — SIGNIFICANT CHANGE UP (ref 8.4–10.5)
CHLORIDE SERPL-SCNC: 101 MMOL/L — SIGNIFICANT CHANGE UP (ref 96–108)
CK MB BLD-MCNC: 2.6 % — SIGNIFICANT CHANGE UP (ref 0–3.5)
CK MB CFR SERPL CALC: 34.9 NG/ML — HIGH (ref 0–6.7)
CK SERPL-CCNC: 1346 U/L — HIGH (ref 30–200)
CO2 SERPL-SCNC: 22 MMOL/L — SIGNIFICANT CHANGE UP (ref 22–31)
CREAT SERPL-MCNC: 1.1 MG/DL — SIGNIFICANT CHANGE UP (ref 0.5–1.3)
GLUCOSE SERPL-MCNC: 138 MG/DL — HIGH (ref 70–99)
HCT VFR BLD CALC: 40.5 % — SIGNIFICANT CHANGE UP (ref 39–50)
HGB BLD-MCNC: 13.4 G/DL — SIGNIFICANT CHANGE UP (ref 13–17)
INR BLD: 1.28 RATIO — HIGH (ref 0.88–1.16)
INR BLD: 1.35 RATIO — HIGH (ref 0.88–1.16)
MAGNESIUM SERPL-MCNC: 2.1 MG/DL — SIGNIFICANT CHANGE UP (ref 1.6–2.6)
MCHC RBC-ENTMCNC: 31.7 PG — SIGNIFICANT CHANGE UP (ref 27–34)
MCHC RBC-ENTMCNC: 33 GM/DL — SIGNIFICANT CHANGE UP (ref 32–36)
MCV RBC AUTO: 96.1 FL — SIGNIFICANT CHANGE UP (ref 80–100)
PHOSPHATE SERPL-MCNC: 2.2 MG/DL — LOW (ref 2.5–4.5)
PLATELET # BLD AUTO: 176 K/UL — SIGNIFICANT CHANGE UP (ref 150–400)
POTASSIUM SERPL-MCNC: 3.8 MMOL/L — SIGNIFICANT CHANGE UP (ref 3.5–5.3)
POTASSIUM SERPL-SCNC: 3.8 MMOL/L — SIGNIFICANT CHANGE UP (ref 3.5–5.3)
PROT SERPL-MCNC: 6 G/DL — SIGNIFICANT CHANGE UP (ref 6–8.3)
PROTHROM AB SERPL-ACNC: 14 SEC — HIGH (ref 9.8–12.7)
PROTHROM AB SERPL-ACNC: 14.7 SEC — HIGH (ref 9.8–12.7)
RBC # BLD: 4.22 M/UL — SIGNIFICANT CHANGE UP (ref 4.2–5.8)
RBC # FLD: 12.1 % — SIGNIFICANT CHANGE UP (ref 10.3–14.5)
SODIUM SERPL-SCNC: 135 MMOL/L — SIGNIFICANT CHANGE UP (ref 135–145)
TROPONIN T SERPL-MCNC: 5.53 NG/ML — HIGH (ref 0–0.06)
WBC # BLD: 11.9 K/UL — HIGH (ref 3.8–10.5)
WBC # FLD AUTO: 11.9 K/UL — HIGH (ref 3.8–10.5)

## 2018-04-20 PROCEDURE — 93010 ELECTROCARDIOGRAM REPORT: CPT

## 2018-04-20 PROCEDURE — 99233 SBSQ HOSP IP/OBS HIGH 50: CPT | Mod: GC

## 2018-04-20 PROCEDURE — 71045 X-RAY EXAM CHEST 1 VIEW: CPT | Mod: 26

## 2018-04-20 PROCEDURE — 99223 1ST HOSP IP/OBS HIGH 75: CPT | Mod: GC

## 2018-04-20 RX ORDER — CLOPIDOGREL BISULFATE 75 MG/1
75 TABLET, FILM COATED ORAL DAILY
Qty: 0 | Refills: 0 | Status: DISCONTINUED | OUTPATIENT
Start: 2018-04-21 | End: 2018-04-26

## 2018-04-20 RX ORDER — FUROSEMIDE 40 MG
20 TABLET ORAL ONCE
Qty: 0 | Refills: 0 | Status: COMPLETED | OUTPATIENT
Start: 2018-04-20 | End: 2018-04-20

## 2018-04-20 RX ORDER — METOPROLOL TARTRATE 50 MG
12.5 TABLET ORAL
Qty: 0 | Refills: 0 | Status: DISCONTINUED | OUTPATIENT
Start: 2018-04-20 | End: 2018-04-24

## 2018-04-20 RX ORDER — TICAGRELOR 90 MG/1
90 TABLET ORAL ONCE
Qty: 0 | Refills: 0 | Status: COMPLETED | OUTPATIENT
Start: 2018-04-20 | End: 2018-04-20

## 2018-04-20 RX ORDER — CAPTOPRIL 12.5 MG/1
6.25 TABLET ORAL EVERY 8 HOURS
Qty: 0 | Refills: 0 | Status: DISCONTINUED | OUTPATIENT
Start: 2018-04-20 | End: 2018-04-20

## 2018-04-20 RX ORDER — POTASSIUM CHLORIDE 20 MEQ
20 PACKET (EA) ORAL ONCE
Qty: 0 | Refills: 0 | Status: COMPLETED | OUTPATIENT
Start: 2018-04-20 | End: 2018-04-20

## 2018-04-20 RX ORDER — WARFARIN SODIUM 2.5 MG/1
5 TABLET ORAL ONCE
Qty: 0 | Refills: 0 | Status: COMPLETED | OUTPATIENT
Start: 2018-04-20 | End: 2018-04-20

## 2018-04-20 RX ORDER — SODIUM CHLORIDE 9 MG/ML
250 INJECTION INTRAMUSCULAR; INTRAVENOUS; SUBCUTANEOUS ONCE
Qty: 0 | Refills: 0 | Status: COMPLETED | OUTPATIENT
Start: 2018-04-20 | End: 2018-04-20

## 2018-04-20 RX ADMIN — HEPARIN SODIUM 1300 UNIT(S)/HR: 5000 INJECTION INTRAVENOUS; SUBCUTANEOUS at 11:05

## 2018-04-20 RX ADMIN — TICAGRELOR 90 MILLIGRAM(S): 90 TABLET ORAL at 18:11

## 2018-04-20 RX ADMIN — Medication 12.5 MILLIGRAM(S): at 16:30

## 2018-04-20 RX ADMIN — WARFARIN SODIUM 5 MILLIGRAM(S): 2.5 TABLET ORAL at 21:51

## 2018-04-20 RX ADMIN — TICAGRELOR 90 MILLIGRAM(S): 90 TABLET ORAL at 05:20

## 2018-04-20 RX ADMIN — SODIUM CHLORIDE 125 MILLILITER(S): 9 INJECTION INTRAMUSCULAR; INTRAVENOUS; SUBCUTANEOUS at 00:15

## 2018-04-20 RX ADMIN — HEPARIN SODIUM 1300 UNIT(S)/HR: 5000 INJECTION INTRAVENOUS; SUBCUTANEOUS at 04:07

## 2018-04-20 RX ADMIN — ATORVASTATIN CALCIUM 80 MILLIGRAM(S): 80 TABLET, FILM COATED ORAL at 21:51

## 2018-04-20 RX ADMIN — HEPARIN SODIUM 1300 UNIT(S)/HR: 5000 INJECTION INTRAVENOUS; SUBCUTANEOUS at 18:15

## 2018-04-20 RX ADMIN — Medication 20 MILLIEQUIVALENT(S): at 05:20

## 2018-04-20 RX ADMIN — Medication 20 MILLIGRAM(S): at 11:03

## 2018-04-20 RX ADMIN — Medication 12.5 MILLIGRAM(S): at 06:20

## 2018-04-20 RX ADMIN — Medication 81 MILLIGRAM(S): at 11:03

## 2018-04-20 NOTE — CHART NOTE - NSCHARTNOTEFT_GEN_A_CORE
76 yo M no sig PMHx who presents with chest pain at OSH in setting of late presentation anterior wall STEMI. He was transferred to [facility] for cardiac catheretization. On 4/16 initial cath was performed with KYRA placed at [facility] with notable 90% lesions in D1 and ramus. He had IABP placed at that time for hypotension. He was given lasix for elevated LVEDP and evidence of heart failure. He subsequently had a staged cath w/ KYRA placed in D1. IABP was removed without evidence of bleeding. He was noted to be hypotensive to 70s last night and was given small IV bolus (250). This AM his beta blocker was given, but subsequently held due to concerns of rales/fluid and hypotension. TTE was notable for LV thrombus and EF 20-25%. He has been maintained on heparin.      REVIEW OF SYSTEMS:    CONSTITUTIONAL: No weakness, fevers or chills  EYES/ENT: No visual changes, no throat pain   RESPIRATORY: No cough, wheezing, hemoptysis; No shortness of breath  CARDIOVASCULAR: No chest pain or palpitations  GASTROINTESTINAL: No abdominal, nausea, vomiting, or hematemesis; No diarrhea or constipation. No melena or hematochezia.  GENITOURINARY: No dysuria, frequency or hematuria  NEUROLOGICAL: No dizziness, numbness, or weakness  SKIN: No itching, burning, rashes, or lesions   All other review of systems is negative unless indicated above.    Vital Signs Last 24 Hrs  T(C): 37.4 (20 Apr 2018 08:25), Max: 37.5 (20 Apr 2018 01:00)  T(F): 99.3 (20 Apr 2018 08:25), Max: 99.5 (20 Apr 2018 01:00)  HR: 94 (20 Apr 2018 11:00) (90 - 112)  BP: 94/64 (20 Apr 2018 11:00) (76/52 - 98/57)  BP(mean): 73 (20 Apr 2018 11:00) (55 - 83)  RR: 18 (20 Apr 2018 11:00) (13 - 24)  SpO2: 96% (20 Apr 2018 10:00) (94% - 98%)      PHYSICAL EXAM:     GENERAL: no acute distress  HEENT: NC/AT, EOMI, neck supple, MMM  RESPIRATORY: mild bibasilar rales, No wheezing  CARDIOVASCULAR: RRR, no murmurs, gallops, rubs  ABDOMINAL: soft, non-tender, non-distended, positive bowel sounds   EXTREMITIES: no clubbing, cyanosis, or edema  NEUROLOGICAL: alert and oriented x 3, non-focal  SKIN: no rashes or lesions   MUSCULOSKELETAL: no gross joint deformity                          13.4   11.9  )-----------( 176      ( 20 Apr 2018 03:29 )             40.5     04-20    135  |  101  |  20  ----------------------------<  138<H>  3.8   |  22  |  1.10    Ca    8.5      20 Apr 2018 03:29  Phos  2.2     04-20  Mg     2.1     04-20    TPro  6.0  /  Alb  2.7<L>  /  TBili  0.8  /  DBili  x   /  AST  268<H>  /  ALT  74<H>  /  AlkPhos  51  04-20      MEDICATIONS  (STANDING):  aspirin  chewable 81 milliGRAM(s) Chew daily  atorvastatin 80 milliGRAM(s) Oral at bedtime  captopril 6.25 milliGRAM(s) Oral every 8 hours  heparin  Infusion.  Unit(s)/Hr (12 mL/Hr) IV Continuous <Continuous>  ticagrelor 90 milliGRAM(s) Oral once  warfarin 5 milliGRAM(s) Oral once    A/P:  76 yo M no pmhx a/w late presentation STEMI s/p KYRA prox LAD 99%, with D1 and ramus 90%  cb hypotension requiring IABP which is removed on 4/19 and BP now stable.     # Late presentation anterior STEMI s/p KYRA pLAD and D1   - Continue ASA and brilinta for today. Last dose brilinta tonight. Plan to start Plavix tomorrow  - Nuha downtrending  - IABP removed  - Continue high intensity statin  - plan to start captopril tonight if BP can tolerate  - Continue to monitor for mechanical complications of late MI  - F/u TSH, lipid, HgA1c    # LV thrombus  - TTE with evidence of LV thrombus, continue heparin. Dosing coumdin tonight  - Continue heparin until therapeutic INR  - Starting coumadin tonight  - Switching from Brilinta to Plavix due to concern of bleeding risk    # Acute decompensated heart failure: satting 94% on 2 L NC. EF 20-25% on TTE  - LVEDP 28 and estimated EF 25%  - s/p lasix 20 this a.m given rales on exam and overall net even fluid status  - Continue to monitor I/Os closely, continue to keep net negative  - Starting captopril today. Holding beta blocker due to concern of hypotension        Yung Graff MD  MAR [id number]

## 2018-04-20 NOTE — PROGRESS NOTE ADULT - SUBJECTIVE AND OBJECTIVE BOX
Patient is a 77y old  Male who presents with a chief complaint of Chest pain/STEMI      HPI:  76 yo M no sig PMHx who presents with chest pain at OSH in setting of late presentation anterior wall STEMI. He was transferred to [facility] for cardiac catheretization. On 4/16 initial cath was performed with KYRA placed at [facility] with notable 90% lesions in D1 and ramus. He had IABP placed at that time for hypotension. He was given lasix for elevated LVEDP and evidence of heart failure. He subsequently had a staged cath w/ KYRA placed in D1. IABP was removed without evidence of bleeding. He was noted to be hypotensive to 70s last night and was given small IV bolus (250). This AM his beta blocker was given, but subsequently held due to concerns of rales/fluid and hypotension. TTE was notable for LV thrombus and EF 20-25%. He has been maintained on heparin.    PMHx/PSHx:   PAST MEDICAL & SURGICAL HISTORY:  No pertinent past medical history  History of tonsillectomy      Social Hx:     Allergies: Allergies    No Known Allergies    Intolerances        Medications Standing   MEDICATIONS  (STANDING):  aspirin  chewable 81 milliGRAM(s) Chew daily  atorvastatin 80 milliGRAM(s) Oral at bedtime  captopril 6.25 milliGRAM(s) Oral every 8 hours  heparin  Infusion.  Unit(s)/Hr (12 mL/Hr) IV Continuous <Continuous>  ticagrelor 90 milliGRAM(s) Oral once  warfarin 5 milliGRAM(s) Oral once      Medications PRN   MEDICATIONS  (PRN):  heparin  Injectable 5500 Unit(s) IV Push every 6 hours PRN For aPTT less than 40  heparin  Injectable 2500 Unit(s) IV Push every 6 hours PRN For aPTT between 40 - 57    I&O's Detail    19 Apr 2018 07:01  -  20 Apr 2018 07:00  --------------------------------------------------------  IN:    heparin  Infusion.: 40 mL    heparin  Infusion.: 124 mL    IV PiggyBack: 250 mL    Oral Fluid: 720 mL  Total IN: 1134 mL    OUT:    Voided: 1150 mL  Total OUT: 1150 mL    Total NET: -16 mL      20 Apr 2018 07:01  -  20 Apr 2018 13:48  --------------------------------------------------------  IN:    heparin  Infusion.: 52 mL    Oral Fluid: 140 mL  Total IN: 192 mL    OUT:  Total OUT: 0 mL    Total NET: 192 mL        Physical Exam:   General: NAD   HEENT: EOMI, PEERL, Nl OP, Nl thyroid gland, No lymphadenopathy, No JVD   CVS: RRR, No murmurs/gallops/regurg  Resp: CTA bilaterally, no rhonchi, rales, wheeze  Abd: Nl BS, soft, NT, ND   :   Ext:     LABS   CBC                       13.4   11.9  )-----------( 176      ( 20 Apr 2018 03:29 )             40.5     CMP 04-20    135  |  101  |  20  ----------------------------<  138<H>  3.8   |  22  |  1.10    Ca    8.5      20 Apr 2018 03:29  Phos  2.2     04-20  Mg     2.1     04-20    TPro  6.0  /  Alb  2.7<L>  /  TBili  0.8  /  DBili  x   /  AST  268<H>  /  ALT  74<H>  /  AlkPhos  51  04-20    PT/INR - ( 20 Apr 2018 10:05 )   PT: 14.0 sec;   INR: 1.28 ratio         PTT - ( 20 Apr 2018 10:05 )  PTT:74.7 sec    IMAGING:  < from: TTE with Doppler (w/Cont) (04.19.18 @ 09:07) >  Conclusions:  1. Severe segmental left ventricular systolic dysfunction.  Akinesis of the mid and apical septum, apex, anterior wall.   Mid to apical inferior and inferolateral wall are severely  hypokinetic.   Endocardial visualizationenhanced with  intravenous injection of echo contrast (Definity). Left  ventricular thrombus seen.  2. Normal right ventricular size and function.    < end of copied text >    76 yo M no pmhx a/w late presentation STEMI s/p KYRA prox LAD 99%, with D1 and ramus 90%  cb hypotension requiring IABP which is removed on 4/19 and BP now stable.     # Late presentation anterior STEMI s/p KRYA pLAD and D1   - Continue ASA and brilinta for today. Last dose brilinta tonight. Plan to start Plavix tomorrow  - Nuha downtrending  - IABP removed  - Continue high intensity statin  - plan to start captopril tonight if BP can tolerate  - Continue to monitor for mechanical complications of late MI  - F/u TSH, lipid, HgA1c    # LV thrombus  - TTE with evidence of LV thrombus, continue heparin. Dosing coumadin tonight  - Continue heparin until therapeutic INR  - Starting coumadin tonight  - Switching from Brilinta to Plavix due to concern of bleeding risk    # Acute decompensated heart failure: satting 94% on 2 L NC. EF 20-25% on TTE  - LVEDP 28 and estimated EF 25%  - s/p lasix 20 this a.m given rales on exam and overall net even fluid status  - Continue to monitor I/Os closely, continue to keep net negative  - Starting captopril today. Holding beta blocker due to concern of hypotension    Holly James PGY-1  Spectre [phone]  Pager # [phone]/ [phone] Patient is a 77y old  Male who presents with a chief complaint of Chest pain/STEMI      HPI:  78 yo M no sig PMHx who presents with chest pain at OSH in setting of late presentation anterior wall STEMI. He was transferred to [facility] for cardiac catheretization. On 4/16 initial cath was performed with KYRA placed at [facility] with notable 90% lesions in D1 and ramus. He had IABP placed at that time for hypotension. He was given lasix for elevated LVEDP and evidence of heart failure. He subsequently had a staged cath w/ KYRA placed in D1. IABP was removed without evidence of bleeding. He was noted to be hypotensive to 70s last night and was given small IV bolus (250). This AM his beta blocker was given, but subsequently held due to concerns of rales/fluid and hypotension. TTE was notable for LV thrombus and EF 20-25%. He has been maintained on heparin.     PMHx/PSHx:   PAST MEDICAL & SURGICAL HISTORY:  No pertinent past medical history  History of tonsillectomy      Social Hx: Denied smoking habit, drinks wine occassionally. No insurance, hasn't visited a doctor in 25 years. 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    Allergies: Allergies    No Known Allergies        Medications Standing   MEDICATIONS  (STANDING):  aspirin  chewable 81 milliGRAM(s) Chew daily  atorvastatin 80 milliGRAM(s) Oral at bedtime  captopril 6.25 milliGRAM(s) Oral every 8 hours  heparin  Infusion.  Unit(s)/Hr (12 mL/Hr) IV Continuous <Continuous>  ticagrelor 90 milliGRAM(s) Oral once  warfarin 5 milliGRAM(s) Oral once      Medications PRN   MEDICATIONS  (PRN):  heparin  Injectable 5500 Unit(s) IV Push every 6 hours PRN For aPTT less than 40  heparin  Injectable 2500 Unit(s) IV Push every 6 hours PRN For aPTT between 40 - 57    I&O's Detail    19 Apr 2018 07:01  -  20 Apr 2018 07:00  --------------------------------------------------------  IN:    heparin  Infusion.: 40 mL    heparin  Infusion.: 124 mL    IV PiggyBack: 250 mL    Oral Fluid: 720 mL  Total IN: 1134 mL    OUT:    Voided: 1150 mL  Total OUT: 1150 mL    Total NET: -16 mL      20 Apr 2018 07:01  -  20 Apr 2018 13:48  --------------------------------------------------------  IN:    heparin  Infusion.: 52 mL    Oral Fluid: 140 mL  Total IN: 192 mL    OUT:  Total OUT: 0 mL    Total NET: 192 mL    Vital Signs Last 24 Hrs  T(C): 37.4 (20 Apr 2018 08:25), Max: 37.5 (20 Apr 2018 01:00)  T(F): 99.3 (20 Apr 2018 08:25), Max: 99.5 (20 Apr 2018 01:00)  HR: 99 (20 Apr 2018 12:11) (90 - 110)  BP: 99/68 (20 Apr 2018 12:11) (76/52 - 99/68)  BP(mean): 73 (20 Apr 2018 11:00) (55 - 83)  RR: 18 (20 Apr 2018 12:11) (14 - 24)  SpO2: 95% (20 Apr 2018 12:11) (95% - 98%)    PHYSICAL EXAM:  General: WN/WD NAD  Neurology: A&Ox3, nonfocal, ENGLAND x 4  Eyes: PERRLA/ EOMI, Gross vision intact  ENT/Neck: Neck supple, trachea midline, No JVD, Gross hearing intact  Respiratory: CTA B/L, No wheezing, rales, rhonchi  CV: mild JVD appreciated, RRR, S1S2, no murmurs, rubs or gallops  Abdominal: Soft, NT, ND +BS,   Extremities: No edema, + peripheral pulses  Skin: No Rashes, Hematoma, Ecchymosis  Incisions: s/p removal of Rt femoral sheath and R. radial sheath. Both incisions c/d/i.  Tubes: N/A    LABS   CBC                       13.4   11.9  )-----------( 176      ( 20 Apr 2018 03:29 )             40.5     CMP 04-20    135  |  101  |  20  ----------------------------<  138<H>  3.8   |  22  |  1.10    Ca    8.5      20 Apr 2018 03:29  Phos  2.2     04-20  Mg     2.1     04-20    TPro  6.0  /  Alb  2.7<L>  /  TBili  0.8  /  DBili  x   /  AST  268<H>  /  ALT  74<H>  /  AlkPhos  51  04-20    PT/INR - ( 20 Apr 2018 10:05 )   PT: 14.0 sec;   INR: 1.28 ratio         PTT - ( 20 Apr 2018 10:05 )  PTT:74.7 sec    IMAGING:  < from: TTE with Doppler (w/Cont) (04.19.18 @ 09:07) >  Conclusions:  1. Severe segmental left ventricular systolic dysfunction.  Akinesis of the mid and apical septum, apex, anterior wall.   Mid to apical inferior and inferolateral wall are severely  hypokinetic.   Endocardial visualizationenhanced with  intravenous injection of echo contrast (Definity). Left  ventricular thrombus seen.  2. Normal right ventricular size and function.    < end of copied text >    78 yo M no pmhx a/w late presentation STEMI s/p KYRA prox LAD 99%, with D1 and ramus 90%  cb hypotension requiring IABP which is removed on 4/19 and BP now stable.     # Late presentation anterior STEMI s/p KYRA pLAD and D1   - Continue ASA and brilinta for today. Last dose brilinta tonight. Plan to start Plavix tomorrow  - Nuha downtrending  - IABP removed  - Continue high intensity statin  - will d/c captopril in s/o hypotension, start metoprolol tartrate 12.5mg BID, uptitrate as tolerated  - Continue to monitor for mechanical complications of late MI  - F/u TSH, lipid, HgA1c    # LV thrombus  - TTE with evidence of LV thrombus, continue heparin. Dosing coumadin tonight  - Continue heparin until therapeutic INR  - Starting coumadin tonight  - Switching from Brilinta to Plavix due to concern of bleeding risk    # Acute decompensated heart failure: satting 94% on 2 L NC. EF 20-25% on TTE  - LVEDP 28 and estimated EF 25%  - s/p lasix 20 this a.m given rales on exam and overall net even fluid status  - Continue to monitor I/Os closely, continue to keep net negative    #Elevated liver enzymes in s/o ischemic liver injury  - downtrending, ctm    Holly James PGY-1  Spectre [phone]  Pager # [phone]/ [phone] Patient is a 77y old  Male who presents with a chief complaint of Chest pain/STEMI      HPI:  78 yo M no sig PMHx who presents with chest pain at OSH in setting of late presentation anterior wall STEMI. He was transferred to [facility] for cardiac catheretization. On 4/16 initial cath was performed with KYRA placed at [facility] with notable 90% lesions in D1 and ramus. He had IABP placed at that time for hypotension. He was given lasix for elevated LVEDP and evidence of heart failure. He subsequently had a staged cath w/ KYRA placed in D1. IABP was removed without evidence of bleeding. He was noted to be hypotensive to 70s last night and was given small IV bolus (250). This AM his beta blocker was given, but subsequently held due to concerns of rales/fluid and hypotension. TTE was notable for LV thrombus and EF 20-25%. He has been maintained on heparin.     PMHx/PSHx:   PAST MEDICAL & SURGICAL HISTORY:  No pertinent past medical history  History of tonsillectomy      Social Hx: Denied smoking habit, drinks wine occassionally. No insurance, hasn't visited a doctor in 25 years. 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    Allergies: Allergies    No Known Allergies        Medications Standing   MEDICATIONS  (STANDING):  aspirin  chewable 81 milliGRAM(s) Chew daily  atorvastatin 80 milliGRAM(s) Oral at bedtime  captopril 6.25 milliGRAM(s) Oral every 8 hours  heparin  Infusion.  Unit(s)/Hr (12 mL/Hr) IV Continuous <Continuous>  ticagrelor 90 milliGRAM(s) Oral once  warfarin 5 milliGRAM(s) Oral once      Medications PRN   MEDICATIONS  (PRN):  heparin  Injectable 5500 Unit(s) IV Push every 6 hours PRN For aPTT less than 40  heparin  Injectable 2500 Unit(s) IV Push every 6 hours PRN For aPTT between 40 - 57    I&O's Detail    19 Apr 2018 07:01  -  20 Apr 2018 07:00  --------------------------------------------------------  IN:    heparin  Infusion.: 40 mL    heparin  Infusion.: 124 mL    IV PiggyBack: 250 mL    Oral Fluid: 720 mL  Total IN: 1134 mL    OUT:    Voided: 1150 mL  Total OUT: 1150 mL    Total NET: -16 mL      20 Apr 2018 07:01  -  20 Apr 2018 13:48  --------------------------------------------------------  IN:    heparin  Infusion.: 52 mL    Oral Fluid: 140 mL  Total IN: 192 mL    OUT:  Total OUT: 0 mL    Total NET: 192 mL    Vital Signs Last 24 Hrs  T(C): 37.4 (20 Apr 2018 08:25), Max: 37.5 (20 Apr 2018 01:00)  T(F): 99.3 (20 Apr 2018 08:25), Max: 99.5 (20 Apr 2018 01:00)  HR: 99 (20 Apr 2018 12:11) (90 - 110)  BP: 99/68 (20 Apr 2018 12:11) (76/52 - 99/68)  BP(mean): 73 (20 Apr 2018 11:00) (55 - 83)  RR: 18 (20 Apr 2018 12:11) (14 - 24)  SpO2: 95% (20 Apr 2018 12:11) (95% - 98%)    PHYSICAL EXAM:  General: WN/WD NAD  Neurology: A&Ox3, nonfocal, ENGLAND x 4  Eyes: PERRLA/ EOMI, Gross vision intact  ENT/Neck: Neck supple, trachea midline, No JVD, Gross hearing intact  Respiratory: CTA B/L, No wheezing, rales, rhonchi  CV: mild JVD appreciated, RRR, S1S2, no murmurs, rubs or gallops  Abdominal: Soft, NT, ND +BS,   Extremities: No edema, + peripheral pulses  Skin: No Rashes, Hematoma, Ecchymosis  Incisions: s/p removal of Rt femoral sheath and R. radial sheath. Both incisions c/d/i.  Tubes: N/A    LABS   CBC                       13.4   11.9  )-----------( 176      ( 20 Apr 2018 03:29 )             40.5     CMP 04-20    135  |  101  |  20  ----------------------------<  138<H>  3.8   |  22  |  1.10    Ca    8.5      20 Apr 2018 03:29  Phos  2.2     04-20  Mg     2.1     04-20    TPro  6.0  /  Alb  2.7<L>  /  TBili  0.8  /  DBili  x   /  AST  268<H>  /  ALT  74<H>  /  AlkPhos  51  04-20    PT/INR - ( 20 Apr 2018 10:05 )   PT: 14.0 sec;   INR: 1.28 ratio         PTT - ( 20 Apr 2018 10:05 )  PTT:74.7 sec    IMAGING:  < from: TTE with Doppler (w/Cont) (04.19.18 @ 09:07) >  Conclusions:  1. Severe segmental left ventricular systolic dysfunction.  Akinesis of the mid and apical septum, apex, anterior wall.   Mid to apical inferior and inferolateral wall are severely  hypokinetic.   Endocardial visualizationenhanced with  intravenous injection of echo contrast (Definity). Left  ventricular thrombus seen.  2. Normal right ventricular size and function.    < end of copied text >    78 yo M no pmhx a/w late presentation STEMI s/p KYRA prox LAD 99%, with D1 and ramus 90%  cb hypotension requiring IABP which is removed on 4/19 and BP now stable.     # Late presentation anterior STEMI s/p KYRA pLAD and D1   - Continue ASA and brilinta for today. Last dose brilinta tonight. Plan to start Plavix tomorrow  - Nuha downtrending  - IABP removed  - Continue high intensity statin  - will d/c captopril in s/o hypotension, start metoprolol tartrate 12.5mg BID, uptitrate as tolerated  - Continue to monitor for mechanical complications of late MI  - F/u TSH, lipid, HgA1c    # LV thrombus  - TTE with evidence of LV thrombus, continue heparin. Dosing coumadin tonight  - Continue heparin until therapeutic INR  - Starting coumadin tonight  - Switching from Brilinta to Plavix due to concern of bleeding risk    # Acute decompensated heart failure: satting 94% on 2 L NC. 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   - ctm for bleeding and platelet count    Holly James PGY-1  Spectre [phone]  Pager # [phone]/ [phone]

## 2018-04-20 NOTE — CHART NOTE - NSCHARTNOTEFT_GEN_A_CORE
CCU Transfer Note    78 yo M no sig PMHx who presents with chest pain at OSH in setting of late presentation anterior wall STEMI. He was transferred to [facility] for cardiac catheretization. On 4/16 initial cath was performed with KYRA placed at [facility] with notable 90% lesions in D1 and ramus. He had IABP placed at that time for hypotension. He was given lasix for elevated LVEDP and evidence of heart failure. He subsequently had a staged cath w/ KYRA placed in D1. IABP was removed without evidence of bleeding. He was noted to be hypotensive to 70s last night and was given small IV bolus (250). This AM his beta blocker was given, but subsequently held due to concerns of rales/fluid and hypotension. TTE was notable for LV thrombus and EF 20-25%. He has been maintained on heparin.     # Late presentation anterior wall STEMI:  - Continue aspirin. Will switch to plavix as he will need to be on triple therapy. Last dose of brilinta tonight with plavix to start in AM tomorrow.   - Continue high intensity statin  - Continue to monitor for mechanical complications of late MI    # Acute systolic heart failure  - Continue to closely monitor I/Os and daily weights.  - Keep net negative.   - Dosing lasix 20 today.   - Starting captopril tonight. BPs around 90s (asymptomatic), maintain MAP of 65.     # LV thrombus  - Continue heparin until therapeutic INR  - Starting coumadin tonight  - Switching from brilinta to plavix due to concern of bleeding risk    Lizabeth Barnett MD  Internal Medicine, PGY-2  Pager [phone] CCU Transfer Note    76 yo M no sig PMHx who presents with chest pain at OSH in setting of late presentation anterior wall STEMI. He was transferred to [facility] for cardiac catheretization. On 4/16 initial cath was performed with KYRA placed at [facility] with notable 90% lesions in D1 and ramus. He had IABP placed at that time for hypotension. He was given lasix for elevated LVEDP and evidence of heart failure. He subsequently had a staged cath w/ KYRA placed in D1. IABP was removed without evidence of bleeding. He was noted to be hypotensive to 70s last night and was given small IV bolus (250). This AM his beta blocker was given, but subsequently held due to concerns of rales/fluid and hypotension. TTE was notable for LV thrombus and EF 20-25%. He has been maintained on heparin.     # Late presentation anterior wall STEMI:  - Continue aspirin. Will switch to plavix as he will need to be on triple therapy. Last dose of brilinta tonight with plavix to start in AM tomorrow.   - Continue high intensity statin  - Continue to monitor for mechanical complications of late MI    # Acute systolic heart failure  - Continue to closely monitor I/Os and daily weights.  - Keep net negative.   - Dosing lasix 20 today given rales on exam and overall net even fluid status  - Starting captopril tonight 10 pm. BPs around 90s (asymptomatic), maintain MAP of 65.   - Holding initiation of beta blocker in setting of newly diagnosed reduced systolic function and rales on exam.     # LV thrombus  - Continue heparin until therapeutic INR  - Starting coumadin tonight  - Switching from brilinta to plavix due to concern of bleeding risk    Lizabeth Barnett MD  Internal Medicine, PGY-2  Pager [phone] CCU Transfer Note    76 yo M no sig PMHx who presents with chest pain at OSH in setting of late presentation anterior wall STEMI. He was transferred to [facility] for cardiac catheretization. On 4/16 initial cath was performed with KYRA placed at [facility] with notable 90% lesions in D1 and ramus. He had IABP placed at that time for hypotension. He was given lasix for elevated LVEDP and evidence of heart failure. He subsequently had a staged cath w/ KYRA placed in D1. IABP was removed without evidence of bleeding. He was noted to be hypotensive to 70s last night and was given small IV bolus (250). This AM his beta blocker was given, but subsequently held due to concerns of rales/fluid and hypotension. TTE was notable for LV thrombus and EF 20-25%. He has been maintained on heparin.     # Late presentation anterior wall STEMI:  - Continue aspirin. Will switch to plavix as he will need to be on triple therapy. Last dose of brilinta tonight with plavix to start in AM tomorrow.   - Continue high intensity statin  - Continue to monitor for mechanical complications of late MI  - F/u TSH, lipid, HgA1c    # Acute systolic heart failure  - Continue to closely monitor I/Os and daily weights.  - Keep net negative.   - Dosing lasix 20 today given rales on exam and overall net even fluid status  - Starting captopril tonight 10 pm. BPs around 90s (asymptomatic), maintain MAP of 65.   - Holding initiation of beta blocker in setting of newly diagnosed reduced systolic function and rales on exam.     # LV thrombus  - Continue heparin until therapeutic INR  - Starting coumadin tonight  - Switching from brilinta to plavix due to concern of bleeding risk    Lizabeth Barnett MD  Internal Medicine, PGY-2  Pager [phone]

## 2018-04-20 NOTE — PROGRESS NOTE ADULT - ASSESSMENT
78 yo M no pmhx presents with chest pain at OSH transferred to [facility] in setting of late presentation STEMI s/p KYRA prox LAD 99%, with D1 and ramus 90% w/ IABP placed at cath lab for hypotension. Presents to the floor with possible acute decompensated systolic heart failure (LVEDP 28 and estimated EF 25%) and persistent chest pain.     # Late presentation anterior STEMI s/p KYRA pLAD and D1   - Continue ASA and brilinta for today. Last dose brilinta tonight. Plavix to start tomorrow  - Completed staged PCI   - TTE with evidence of LV thrombus, continue heparin. Dosing coumdin tonight  - Nuha downtrending  - IABP removed  - Now off nitro gtt and ISDN    # Acute decompensated heart failure: satting 94% on 2 L NC. EF 20-25% on TTE  - LVEDP 28 and estimated EF 25%  - Repeat lasix today  - Continue to monitor I/Os closely, continue to keep net negative  - Starting captopril today. Holding beta blocker due to concern of hypotension    # DEMARCUS possibly in setting of lasix dose, vs contrast vs decreased renal perfusion  - Continue to monitor creatinine and UOP  - Avoid nephrotoxic agents as possible    # Leukocytosis: Downtrending  - Likely elevated in setting of stress response. No evidence of fever.     # Elevated LFTs: downtrending  - Likely in setting of STEMI, continue to trend and monitor    Lizabeth Barnett MD  Internal Medicine, PGY-2  Pager [phone]

## 2018-04-20 NOTE — PROGRESS NOTE ADULT - SUBJECTIVE AND OBJECTIVE BOX
HPI / INTERVAL HISTORY:  Patient seen and examined at bedside.      OBJECTIVE:  VITAL SIGNS:  ICU Vital Signs Last 24 Hrs  T(C): 37.3 (20 Apr 2018 05:00), Max: 37.5 (20 Apr 2018 01:00)  T(F): 99.2 (20 Apr 2018 05:00), Max: 99.5 (20 Apr 2018 01:00)  HR: 98 (20 Apr 2018 06:25) (96 - 112)  BP: 97/60 (20 Apr 2018 06:17) (76/52 - 98/57)  BP(mean): 71 (20 Apr 2018 06:17) (55 - 83)  ABP: --  ABP(mean): --  RR: 21 (20 Apr 2018 06:17) (13 - 24)  SpO2: 96% (20 Apr 2018 05:00) (94% - 98%)        04-18 @ 07:01  -  04-19 @ 07:00  --------------------------------------------------------  IN: 528 mL / OUT: 675 mL / NET: -147 mL    04-19 @ 07:01  -  04-20 @ 06:57  --------------------------------------------------------  IN: 1134 mL / OUT: 1150 mL / NET: -16 mL      CAPILLARY BLOOD GLUCOSE      POCT Blood Glucose.: 136 mg/dL (18 Apr 2018 10:15)      PHYSICAL EXAM:  Gen:   HEENT: NC/AT; PERRL, anicteric sclera  Neck: supple, no JVD  Resp: clear to ausculation B/L; no wheezes, rales or rhonchi  Cardiovasc: S1S2 normal; RRR; no murmurs, rubs or gallops  GI: soft, nondistended, nontender; +BS  Extr: warm, well-perfused, PT/DP pulses 2+ B/L; no LE edema  Skin: normal color and turgor  Neuro:     LABS:                        13.4   11.9  )-----------( 176      ( 20 Apr 2018 03:29 )             40.5     04-20    135  |  101  |  20  ----------------------------<  138<H>  3.8   |  22  |  1.10    Ca    8.5      20 Apr 2018 03:29  Phos  2.2     04-20  Mg     2.1     04-20    TPro  6.0  /  Alb  2.7<L>  /  TBili  0.8  /  DBili  x   /  AST  268<H>  /  ALT  74<H>  /  AlkPhos  51  04-20    LIVER FUNCTIONS - ( 20 Apr 2018 03:29 )  Alb: 2.7 g/dL / Pro: 6.0 g/dL / ALK PHOS: 51 U/L / ALT: 74 U/L / AST: 268 U/L / GGT: x           PT/INR - ( 20 Apr 2018 03:29 )   PT: 14.7 sec;   INR: 1.35 ratio         PTT - ( 20 Apr 2018 03:29 )  PTT:56.9 sec    CARDIAC MARKERS ( 20 Apr 2018 03:29 )  x     / 5.53 ng/mL / 1346 U/L / x     / 34.9 ng/mL  CARDIAC MARKERS ( 19 Apr 2018 05:23 )  x     / 9.82 ng/mL / 3971 U/L / x     / 219.4 ng/mL  CARDIAC MARKERS ( 18 Apr 2018 23:19 )  x     / 12.43 ng/mL / 5655 U/L / x     / 443.6 ng/mL  CARDIAC MARKERS ( 18 Apr 2018 16:15 )  x     / 20.09 ng/mL / 9145 U/L / x     / >600 ng/mL  CARDIAC MARKERS ( 18 Apr 2018 10:35 )  97.7 ng/mL / x     / 4386 U/L / x     / 777.3 ng/mL          RADIOLOGY & ADDITIONAL TESTS:       MEDICATIONS:  aspirin  chewable 81 milliGRAM(s) Chew daily  atorvastatin 80 milliGRAM(s) Oral at bedtime  heparin  Infusion.  Unit(s)/Hr IV Continuous <Continuous>  heparin  Injectable 5500 Unit(s) IV Push every 6 hours PRN  heparin  Injectable 2500 Unit(s) IV Push every 6 hours PRN  metoprolol tartrate 12.5 milliGRAM(s) Oral every 12 hours  ticagrelor 90 milliGRAM(s) Oral two times a day      ALLERGIES:  No Known Allergies HPI / INTERVAL HISTORY:  Patient seen and examined at bedside. Overnight was hypotensive to 70s. Was given 250 cc bolus. Started on beta blocker this AM but subsequently hypotensive. Shortness of breath with deep breathing. Chest pain improved to 1-2/10. Now off isordil.       OBJECTIVE:  VITAL SIGNS:  ICU Vital Signs Last 24 Hrs  T(C): 37.3 (20 Apr 2018 05:00), Max: 37.5 (20 Apr 2018 01:00)  T(F): 99.2 (20 Apr 2018 05:00), Max: 99.5 (20 Apr 2018 01:00)  HR: 98 (20 Apr 2018 06:25) (96 - 112)  BP: 97/60 (20 Apr 2018 06:17) (76/52 - 98/57)  BP(mean): 71 (20 Apr 2018 06:17) (55 - 83)  ABP: --  ABP(mean): --  RR: 21 (20 Apr 2018 06:17) (13 - 24)  SpO2: 96% (20 Apr 2018 05:00) (94% - 98%)        04-18 @ 07:01  -  04-19 @ 07:00  --------------------------------------------------------  IN: 528 mL / OUT: 675 mL / NET: -147 mL    04-19 @ 07:01  -  04-20 @ 06:57  --------------------------------------------------------  IN: 1134 mL / OUT: 1150 mL / NET: -16 mL      CAPILLARY BLOOD GLUCOSE      POCT Blood Glucose.: 136 mg/dL (18 Apr 2018 10:15)      PHYSICAL EXAM:  Gen: NAD  HEENT: NC/AT  Neck: supple  Resp: posterior rales, no wheezes or rhonchi  Cardiovasc: S1S2 normal; RRR; no murmurs, rubs or gallops  GI: soft, nondistended, nontender; no guarding or rigidity  Extr: no LE edema  Skin: normal color and turgor, no bleeding from R groin   Neuro: appropriate    LABS:                        13.4   11.9  )-----------( 176      ( 20 Apr 2018 03:29 )             40.5     04-20    135  |  101  |  20  ----------------------------<  138<H>  3.8   |  22  |  1.10    Ca    8.5      20 Apr 2018 03:29  Phos  2.2     04-20  Mg     2.1     04-20    TPro  6.0  /  Alb  2.7<L>  /  TBili  0.8  /  DBili  x   /  AST  268<H>  /  ALT  74<H>  /  AlkPhos  51  04-20    LIVER FUNCTIONS - ( 20 Apr 2018 03:29 )  Alb: 2.7 g/dL / Pro: 6.0 g/dL / ALK PHOS: 51 U/L / ALT: 74 U/L / AST: 268 U/L / GGT: x           PT/INR - ( 20 Apr 2018 03:29 )   PT: 14.7 sec;   INR: 1.35 ratio         PTT - ( 20 Apr 2018 03:29 )  PTT:56.9 sec    CARDIAC MARKERS ( 20 Apr 2018 03:29 )  x     / 5.53 ng/mL / 1346 U/L / x     / 34.9 ng/mL  CARDIAC MARKERS ( 19 Apr 2018 05:23 )  x     / 9.82 ng/mL / 3971 U/L / x     / 219.4 ng/mL  CARDIAC MARKERS ( 18 Apr 2018 23:19 )  x     / 12.43 ng/mL / 5655 U/L / x     / 443.6 ng/mL  CARDIAC MARKERS ( 18 Apr 2018 16:15 )  x     / 20.09 ng/mL / 9145 U/L / x     / >600 ng/mL  CARDIAC MARKERS ( 18 Apr 2018 10:35 )  97.7 ng/mL / x     / 4386 U/L / x     / 777.3 ng/mL          RADIOLOGY & ADDITIONAL TESTS:       MEDICATIONS:  aspirin  chewable 81 milliGRAM(s) Chew daily  atorvastatin 80 milliGRAM(s) Oral at bedtime  heparin  Infusion.  Unit(s)/Hr IV Continuous <Continuous>  heparin  Injectable 5500 Unit(s) IV Push every 6 hours PRN  heparin  Injectable 2500 Unit(s) IV Push every 6 hours PRN  metoprolol tartrate 12.5 milliGRAM(s) Oral every 12 hours  ticagrelor 90 milliGRAM(s) Oral two times a day      ALLERGIES:  No Known Allergies

## 2018-04-21 DIAGNOSIS — Z29.9 ENCOUNTER FOR PROPHYLACTIC MEASURES, UNSPECIFIED: ICD-10-CM

## 2018-04-21 DIAGNOSIS — I50.9 HEART FAILURE, UNSPECIFIED: ICD-10-CM

## 2018-04-21 DIAGNOSIS — I21.02 ST ELEVATION (STEMI) MYOCARDIAL INFARCTION INVOLVING LEFT ANTERIOR DESCENDING CORONARY ARTERY: ICD-10-CM

## 2018-04-21 DIAGNOSIS — R74.8 ABNORMAL LEVELS OF OTHER SERUM ENZYMES: ICD-10-CM

## 2018-04-21 DIAGNOSIS — I82.90 ACUTE EMBOLISM AND THROMBOSIS OF UNSPECIFIED VEIN: ICD-10-CM

## 2018-04-21 DIAGNOSIS — D69.6 THROMBOCYTOPENIA, UNSPECIFIED: ICD-10-CM

## 2018-04-21 DIAGNOSIS — M25.561 PAIN IN RIGHT KNEE: ICD-10-CM

## 2018-04-21 LAB
ALBUMIN SERPL ELPH-MCNC: 3.1 G/DL — LOW (ref 3.3–5)
ALP SERPL-CCNC: 77 U/L — SIGNIFICANT CHANGE UP (ref 40–120)
ALT FLD-CCNC: 54 U/L — HIGH (ref 10–45)
ANION GAP SERPL CALC-SCNC: 15 MMOL/L — SIGNIFICANT CHANGE UP (ref 5–17)
APPEARANCE UR: ABNORMAL
APTT BLD: 152.5 SEC — CRITICAL HIGH (ref 27.5–37.4)
APTT BLD: 49.4 SEC — HIGH (ref 27.5–37.4)
AST SERPL-CCNC: 119 U/L — HIGH (ref 10–40)
BACTERIA # UR AUTO: NEGATIVE — SIGNIFICANT CHANGE UP
BASOPHILS # BLD AUTO: 0.01 K/UL — SIGNIFICANT CHANGE UP (ref 0–0.2)
BASOPHILS NFR BLD AUTO: 0.1 % — SIGNIFICANT CHANGE UP (ref 0–2)
BILIRUB SERPL-MCNC: 0.8 MG/DL — SIGNIFICANT CHANGE UP (ref 0.2–1.2)
BILIRUB UR-MCNC: NEGATIVE — SIGNIFICANT CHANGE UP
BUN SERPL-MCNC: 21 MG/DL — SIGNIFICANT CHANGE UP (ref 7–23)
CALCIUM SERPL-MCNC: 8.5 MG/DL — SIGNIFICANT CHANGE UP (ref 8.4–10.5)
CHLORIDE SERPL-SCNC: 101 MMOL/L — SIGNIFICANT CHANGE UP (ref 96–108)
CHOLEST SERPL-MCNC: 167 MG/DL — SIGNIFICANT CHANGE UP (ref 10–199)
CO2 SERPL-SCNC: 23 MMOL/L — SIGNIFICANT CHANGE UP (ref 22–31)
COLOR SPEC: YELLOW — SIGNIFICANT CHANGE UP
CREAT SERPL-MCNC: 1.15 MG/DL — SIGNIFICANT CHANGE UP (ref 0.5–1.3)
DIFF PNL FLD: ABNORMAL
EOSINOPHIL # BLD AUTO: 0.06 K/UL — SIGNIFICANT CHANGE UP (ref 0–0.5)
EOSINOPHIL NFR BLD AUTO: 0.6 % — SIGNIFICANT CHANGE UP (ref 0–6)
EPI CELLS # UR: 4 /HPF — SIGNIFICANT CHANGE UP (ref 0–5)
GLUCOSE SERPL-MCNC: 128 MG/DL — HIGH (ref 70–99)
GLUCOSE UR QL: NEGATIVE MG/DL — SIGNIFICANT CHANGE UP
GRAN CASTS # UR COMP ASSIST: 2 /LPF — HIGH
HBA1C BLD-MCNC: 5.9 % — HIGH (ref 4–5.6)
HCT VFR BLD CALC: 40.6 % — SIGNIFICANT CHANGE UP (ref 39–50)
HDLC SERPL-MCNC: 58 MG/DL — SIGNIFICANT CHANGE UP (ref 40–125)
HGB BLD-MCNC: 13.4 G/DL — SIGNIFICANT CHANGE UP (ref 13–17)
HYALINE CASTS # UR AUTO: 1 /LPF — SIGNIFICANT CHANGE UP (ref 0–7)
IMM GRANULOCYTES NFR BLD AUTO: 0.2 % — SIGNIFICANT CHANGE UP (ref 0–1.5)
INR BLD: 1.16 RATIO — SIGNIFICANT CHANGE UP (ref 0.88–1.16)
KETONES UR-MCNC: NEGATIVE — SIGNIFICANT CHANGE UP
LEUKOCYTE ESTERASE UR-ACNC: NEGATIVE — SIGNIFICANT CHANGE UP
LIPID PNL WITH DIRECT LDL SERPL: 91 MG/DL — SIGNIFICANT CHANGE UP
LYMPHOCYTES # BLD AUTO: 0.83 K/UL — LOW (ref 1–3.3)
LYMPHOCYTES # BLD AUTO: 7.7 % — LOW (ref 13–44)
MAGNESIUM SERPL-MCNC: 2.3 MG/DL — SIGNIFICANT CHANGE UP (ref 1.6–2.6)
MCHC RBC-ENTMCNC: 31.4 PG — SIGNIFICANT CHANGE UP (ref 27–34)
MCHC RBC-ENTMCNC: 33 GM/DL — SIGNIFICANT CHANGE UP (ref 32–36)
MCV RBC AUTO: 95.1 FL — SIGNIFICANT CHANGE UP (ref 80–100)
MONOCYTES # BLD AUTO: 1.04 K/UL — HIGH (ref 0–0.9)
MONOCYTES NFR BLD AUTO: 9.7 % — SIGNIFICANT CHANGE UP (ref 2–14)
NEUTROPHILS # BLD AUTO: 8.78 K/UL — HIGH (ref 1.8–7.4)
NEUTROPHILS NFR BLD AUTO: 81.7 % — HIGH (ref 43–77)
NITRITE UR-MCNC: NEGATIVE — SIGNIFICANT CHANGE UP
PF4 HEPARIN CMPLX AB SER-ACNC: NEGATIVE — SIGNIFICANT CHANGE UP
PF4 HEPARIN CMPLX AB SERPL QL IA: 0.14 ABS — SIGNIFICANT CHANGE UP
PH UR: 5 — SIGNIFICANT CHANGE UP (ref 5–8)
PHOSPHATE SERPL-MCNC: 2.6 MG/DL — SIGNIFICANT CHANGE UP (ref 2.5–4.5)
PLATELET # BLD AUTO: 207 K/UL — SIGNIFICANT CHANGE UP (ref 150–400)
POTASSIUM SERPL-MCNC: 3.5 MMOL/L — SIGNIFICANT CHANGE UP (ref 3.5–5.3)
POTASSIUM SERPL-SCNC: 3.5 MMOL/L — SIGNIFICANT CHANGE UP (ref 3.5–5.3)
PROT SERPL-MCNC: 6.5 G/DL — SIGNIFICANT CHANGE UP (ref 6–8.3)
PROT UR-MCNC: 30 MG/DL
PROTHROM AB SERPL-ACNC: 13.2 SEC — HIGH (ref 10–13.1)
RBC # BLD: 4.27 M/UL — SIGNIFICANT CHANGE UP (ref 4.2–5.8)
RBC # FLD: 13.6 % — SIGNIFICANT CHANGE UP (ref 10.3–14.5)
RBC CASTS # UR COMP ASSIST: 0 /HPF — SIGNIFICANT CHANGE UP (ref 0–4)
SODIUM SERPL-SCNC: 139 MMOL/L — SIGNIFICANT CHANGE UP (ref 135–145)
SP GR SPEC: 1.02 — SIGNIFICANT CHANGE UP (ref 1.01–1.02)
T4 AB SER-ACNC: 5.7 UG/DL — SIGNIFICANT CHANGE UP (ref 4.6–12)
TOTAL CHOLESTEROL/HDL RATIO MEASUREMENT: 2.9 RATIO — LOW (ref 3.4–9.6)
TRIGL SERPL-MCNC: 89 MG/DL — SIGNIFICANT CHANGE UP (ref 10–149)
TSH SERPL-MCNC: 2.52 UIU/ML — SIGNIFICANT CHANGE UP (ref 0.27–4.2)
UROBILINOGEN FLD QL: NEGATIVE MG/DL — SIGNIFICANT CHANGE UP
WBC # BLD: 10.74 K/UL — HIGH (ref 3.8–10.5)
WBC # FLD AUTO: 10.74 K/UL — HIGH (ref 3.8–10.5)
WBC UR QL: 5 /HPF — SIGNIFICANT CHANGE UP (ref 0–5)

## 2018-04-21 PROCEDURE — 99233 SBSQ HOSP IP/OBS HIGH 50: CPT

## 2018-04-21 PROCEDURE — 99233 SBSQ HOSP IP/OBS HIGH 50: CPT | Mod: GC

## 2018-04-21 RX ORDER — WARFARIN SODIUM 2.5 MG/1
5 TABLET ORAL ONCE
Qty: 0 | Refills: 0 | Status: DISCONTINUED | OUTPATIENT
Start: 2018-04-21 | End: 2018-04-21

## 2018-04-21 RX ORDER — LISINOPRIL 2.5 MG/1
2.5 TABLET ORAL DAILY
Qty: 0 | Refills: 0 | Status: DISCONTINUED | OUTPATIENT
Start: 2018-04-21 | End: 2018-04-26

## 2018-04-21 RX ORDER — ACETAMINOPHEN 500 MG
650 TABLET ORAL EVERY 6 HOURS
Qty: 0 | Refills: 0 | Status: DISCONTINUED | OUTPATIENT
Start: 2018-04-21 | End: 2018-04-26

## 2018-04-21 RX ORDER — SODIUM,POTASSIUM PHOSPHATES 278-250MG
1 POWDER IN PACKET (EA) ORAL
Qty: 0 | Refills: 0 | Status: DISCONTINUED | OUTPATIENT
Start: 2018-04-21 | End: 2018-04-26

## 2018-04-21 RX ORDER — WARFARIN SODIUM 2.5 MG/1
6 TABLET ORAL ONCE
Qty: 0 | Refills: 0 | Status: COMPLETED | OUTPATIENT
Start: 2018-04-21 | End: 2018-04-21

## 2018-04-21 RX ADMIN — Medication 1 TABLET(S): at 17:26

## 2018-04-21 RX ADMIN — HEPARIN SODIUM 0 UNIT(S)/HR: 5000 INJECTION INTRAVENOUS; SUBCUTANEOUS at 17:25

## 2018-04-21 RX ADMIN — Medication 12.5 MILLIGRAM(S): at 05:59

## 2018-04-21 RX ADMIN — ATORVASTATIN CALCIUM 80 MILLIGRAM(S): 80 TABLET, FILM COATED ORAL at 21:12

## 2018-04-21 RX ADMIN — Medication 12.5 MILLIGRAM(S): at 17:26

## 2018-04-21 RX ADMIN — HEPARIN SODIUM 1400 UNIT(S)/HR: 5000 INJECTION INTRAVENOUS; SUBCUTANEOUS at 10:38

## 2018-04-21 RX ADMIN — Medication 1 TABLET(S): at 10:31

## 2018-04-21 RX ADMIN — HEPARIN SODIUM 1200 UNIT(S)/HR: 5000 INJECTION INTRAVENOUS; SUBCUTANEOUS at 18:27

## 2018-04-21 RX ADMIN — HEPARIN SODIUM 2500 UNIT(S): 5000 INJECTION INTRAVENOUS; SUBCUTANEOUS at 10:41

## 2018-04-21 RX ADMIN — Medication 650 MILLIGRAM(S): at 11:55

## 2018-04-21 RX ADMIN — LISINOPRIL 2.5 MILLIGRAM(S): 2.5 TABLET ORAL at 17:26

## 2018-04-21 RX ADMIN — Medication 1 TABLET(S): at 11:55

## 2018-04-21 RX ADMIN — Medication 81 MILLIGRAM(S): at 11:55

## 2018-04-21 RX ADMIN — CLOPIDOGREL BISULFATE 75 MILLIGRAM(S): 75 TABLET, FILM COATED ORAL at 11:55

## 2018-04-21 RX ADMIN — WARFARIN SODIUM 6 MILLIGRAM(S): 2.5 TABLET ORAL at 21:12

## 2018-04-21 NOTE — PROVIDER CONTACT NOTE (OTHER) - ACTION/TREATMENT ORDERED:
MD aware of the event. Continue cardiac Monitoring and notify MD if any further events/distress noted. Will continue to monitor.

## 2018-04-21 NOTE — CONSULT NOTE ADULT - ASSESSMENT
77M, no hx, pw CP, late AW STEMI.   4/16 - KYRA p LAD  , IABP  Staged PCI D1(90%)  LVEDP elevated .  LV thrombus, EF 25%.     ACS / HFrEF  - ASA/ plavix(plavix due to triple therapy)  - warfarin for LV thrombus  - metop 12.5 BID (limited titration due to hypotension)  - atorva 80  - in light of decr EF, ectopy on tele, late presentation, would favor lifevest. EP and zoll rep to see patient.

## 2018-04-21 NOTE — CONSULT NOTE ADULT - SUBJECTIVE AND OBJECTIVE BOX
HISTORY OF PRESENT ILLNESS:   77M, no hx, pw CP, late AW STEMI.   4/16 - KYRA p LAD  , IABP  Staged PCI D1(90%)  LVEDP elevated .  LV thrombus, EF 25%.       EP consulted for lifevest.     Allergies    No Known Allergies    Intolerances    	    MEDICATIONS:  aspirin  chewable 81 milliGRAM(s) Chew daily  clopidogrel Tablet 75 milliGRAM(s) Oral daily  heparin  Infusion.  Unit(s)/Hr IV Continuous <Continuous>  heparin  Injectable 5500 Unit(s) IV Push every 6 hours PRN  heparin  Injectable 2500 Unit(s) IV Push every 6 hours PRN  metoprolol tartrate 12.5 milliGRAM(s) Oral two times a day  warfarin 5 milliGRAM(s) Oral once            atorvastatin 80 milliGRAM(s) Oral at bedtime    potassium acid phosphate/sodium acid phosphate tablet (K-PHOS No. 2) 1 Tablet(s) Oral three times a day with meals      PAST MEDICAL & SURGICAL HISTORY:  No pertinent past medical history  History of tonsillectomy      FAMILY HISTORY:  No pertinent family history in first degree relatives      SOCIAL HISTORY:    [ ] Non-smoker  [ ] Smoker  [ ] Alcohol      REVIEW OF SYSTEMS:  General: no fatigue/malaise, weight loss/gain.  Skin: no rashes.  Ophthalmologic: no blurred vision, no loss of vision. 	  ENT: no sore throat, rhinorrhea, sinus congestion.  Respiratory: no SOB, cough or wheeze.  Gastrointestinal:  no N/V/D, no melena/hematemesis/hematochezia.  Genitourinary: no dysuria/hesitancy or hematuria.  Musculoskeletal: no myalgias or arthralgias.  Neurological: no changes in vision or hearing, no lightheadedness/dizziness, no syncope/near syncope	  Psychiatric: no unusual stress/anxiety.   Hematology/Lymphatics: no unusual bleeding, bruising and no lymphadenopathy.  Endocrine: no unusual thirst.   All others negative except as stated above and in HPI.    PHYSICAL EXAM:  T(C): 36.9 (04-21-18 @ 04:30), Max: 36.9 (04-20-18 @ 21:10)  HR: 92 (04-21-18 @ 05:56) (91 - 99)  BP: 96/61 (04-21-18 @ 05:56) (87/56 - 109/66)  RR: 18 (04-21-18 @ 04:30) (15 - 18)  SpO2: 95% (04-21-18 @ 04:30) (95% - 98%)  Wt(kg): --  I&O's Summary    20 Apr 2018 07:01  -  21 Apr 2018 07:00  --------------------------------------------------------  IN: 723 mL / OUT: 780 mL / NET: -57 mL        Appearance: Normal	  HEENT:   Normal oral mucosa, PERRL, EOMI	  Lymphatic: No lymphadenopathy  Cardiovascular: Normal S1 S2, No JVD, No murmurs, No edema  Respiratory: Lungs clear to auscultation	  Psychiatry: A & O x 3, Mood & affect appropriate  Gastrointestinal:  Soft, Non-tender, + BS	  Skin: No rashes, No ecchymoses, No cyanosis	  Neurologic: Non-focal  Extremities: Normal range of motion, No clubbing, cyanosis or edema  Vascular: Peripheral pulses palpable 2+ bilaterally        LABS:	 	    CBC Full  -  ( 20 Apr 2018 03:29 )  WBC Count : 11.9 K/uL  Hemoglobin : 13.4 g/dL  Hematocrit : 40.5 %  Platelet Count - Automated : 176 K/uL  Mean Cell Volume : 96.1 fl  Mean Cell Hemoglobin : 31.7 pg  Mean Cell Hemoglobin Concentration : 33.0 gm/dL  Auto Neutrophil # : x  Auto Lymphocyte # : x  Auto Monocyte # : x  Auto Eosinophil # : x  Auto Basophil # : x  Auto Neutrophil % : x  Auto Lymphocyte % : x  Auto Monocyte % : x  Auto Eosinophil % : x  Auto Basophil % : x    04-21    139  |  101  |  21  ----------------------------<  128<H>  3.5   |  23  |  1.15  04-20    135  |  101  |  20  ----------------------------<  138<H>  3.8   |  22  |  1.10    Ca    8.5      21 Apr 2018 06:30  Ca    8.5      20 Apr 2018 03:29  Phos  2.6     04-21  Phos  2.2     04-20  Mg     2.3     04-21  Mg     2.1     04-20    TPro  6.5  /  Alb  3.1<L>  /  TBili  0.8  /  DBili  x   /  AST  119<H>  /  ALT  54<H>  /  AlkPhos  77  04-21  TPro  6.0  /  Alb  2.7<L>  /  TBili  0.8  /  DBili  x   /  AST  268<H>  /  ALT  74<H>  /  AlkPhos  51  04-20      proBNP:   Lipid Profile:   HgA1c:   TSH:       CARDIAC MARKERS:  Troponin I, Serum: 97.7 ng/mL (04-18 @ 10:35)            TELEMETRY: 	    ECG:  	  RADIOLOGY:  OTHER: 	    PREVIOUS DIAGNOSTIC TESTING:    [ ] Echocardiogram: < from: TTE with Doppler (w/Cont) (04.19.18 @ 09:07) >    Patient name: LUNA COOPER  YOB: 1940   Age: 77 (M)   MR#: [id number]  Study Date: 4/19/2018  Location: [place] [id number]: Kalpana Davis RDCS  Study quality: Technically fair  Referring Physician: Roseanne French MD  Blood Pressure: 92/69 mmHg  Height: 170 cm  Weight: 68 kg  BSA: 1.8 m2  ------------------------------------------------------------------------  PROCEDURE: Transthoracic echocardiogram with 2-D, M-Mode  and complete spectral and color flow Doppler. Verbal  consentwas obtained for injection of echo contrast  following a discussion of risks and benefits. Following  intravenous injection of contrast, harmonic imaging was  performed.  INDICATION: ST elevation (STEMI) myocardial infarction of  unspecified site (I21.3)  ------------------------------------------------------------------------  Dimensions:    Normal Values:  LA:     2.4    2.0 - 4.0 cm  Ao:     2.7    2.0 - 3.8 cm  SEPTUM: 1.0    0.6 - 1.2 cm  PWT:    1.0    0.6 - 1.1 cm  LVIDd:  4.2    3.0 - 5.6cm  LVIDs:  3.7    1.8 - 4.0 cm  Derived variables:  LVMI: 77 g/m2  RWT: 0.47  Fractional short: 12 %  EF (Visual Estimate): 20-25 %  Doppler Peak Velocity (m/sec): AoV=1.3  ------------------------------------------------------------------------  Observations:  Mitral Valve: Normal mitral valve.  Aortic Valve/Aorta: Normal trileaflet aortic valve. Peak  transaortic valve gradient equals 7 mm Hg. Peak left  ventricular outflow tract gradient equals 1 mm Hg, mean  gradient is equal to 3 mm Hg, LVOT velocity time integral  equals 16 cm.  Aortic Root: 2.7 cm.  Left Atrium: Normal left atrium.  LA volume index = 22  cc/m2.  Left Ventricle: Severe segmental left ventricular systolic  dysfunction.  Akinesis of the mid and apical septum, apex,  anterior wall.  Mid to apical inferior and inferolateral  wall are severely hypokinetic.   Endocardial visualization  enhanced with intravenous injection of echo contrast  (Definity). Left ventricular thrombus seen. Normal left  ventricular internal dimensions and wall thicknesses.  Right Heart: Normal right atrium. Normal right ventricular  size and function. Normal tricuspid valve. Normal pulmonic  valve.  Pericardium/Pleura: Normal pericardium with no pericardial  effusion.  Hemodynamic: Estimated right atrial pressure is 8 mm Hg.  Estimated right ventricular systolic pressure equals 18 mm  Hg, assuming right atrial pressure equals 8 mm Hg,  consistent with normal pulmonary pressures.  ------------------------------------------------------------------------  Conclusions:  1. Severe segmental left ventricular systolic dysfunction.  Akinesis of the mid and apical septum, apex, anterior wall.   Mid to apical inferior and inferolateral wall are severely  hypokinetic.   Endocardial visualizationenhanced with  intravenous injection of echo contrast (Definity). Left  ventricular thrombus seen.  2. Normal right ventricular size and function.  *** No previous Echo exam.  ------------------------------------------------------------------------  Confirmed on  4/19/2018 - 11:14:14 by Amol Greenfield M.D.  ------------------------------------------------------------------------    < end of copied text >    [ ]  Catheterization:  [ ] Stress Test:

## 2018-04-21 NOTE — CHART NOTE - NSCHARTNOTEFT_GEN_A_CORE
Patient with R. knee pain this AM which has resolved with ambulation. No effusion on exam. No calor, no tenderness to palpation. Crepitus present in both knees bilaterally and patient endorsing feeling well. Discontinued XR and discussed plan with patient. Will continue to follow closely    Cathy Go PGY 2

## 2018-04-21 NOTE — PROVIDER CONTACT NOTE (OTHER) - ASSESSMENT
Patient A&Ox4 male. Denies CP/SOB/Palpitations.  Asymptomatic at this time.  B/P 92/60, Pulse 81, Resp 18, Spo2 97% RA.

## 2018-04-21 NOTE — PROGRESS NOTE ADULT - PROBLEM SELECTOR PLAN 2
satting 94% on RA; EF 20-25% on TTE  - LVEDP 28 and estimated EF 25%  - Continue to monitor I/Os closely, continue to keep net negative  - satting 94% on RA; EF 20-25% on TTE  - LVEDP 28 and estimated EF 25%  - Continue to monitor I/Os closely, continue to keep net negative  - Cardiology recommended life vest for reduced EF. EP following

## 2018-04-21 NOTE — PROGRESS NOTE ADULT - SUBJECTIVE AND OBJECTIVE BOX
Patient is a 77y old  Male who presents with a chief complaint of Chest pain/STEMI (18 Apr 2018 15:15)      SUBJECTIVE / OVERNIGHT EVENTS: Overnight on tele had 6 beats of NSVT w/ SBP 90s, asymtpomatic. Today AM endorses 3/10 acute R. anterior knee pain. Associated with rectal temp 101.3. Unable to comfortably ambulate on R. knee. Pain is non radiating. Denied trauma. Denied CP, palpitations, SOB, urinary sxs.     MEDICATIONS  (STANDING):  aspirin  chewable 81 milliGRAM(s) Chew daily  atorvastatin 80 milliGRAM(s) Oral at bedtime  clopidogrel Tablet 75 milliGRAM(s) Oral daily  heparin  Infusion.  Unit(s)/Hr (12 mL/Hr) IV Continuous <Continuous>  metoprolol tartrate 12.5 milliGRAM(s) Oral two times a day  potassium acid phosphate/sodium acid phosphate tablet (K-PHOS No. 2) 1 Tablet(s) Oral three times a day with meals  warfarin 5 milliGRAM(s) Oral once    MEDICATIONS  (PRN):  acetaminophen   Tablet. 650 milliGRAM(s) Oral every 6 hours PRN Moderate Pain (4 - 6)  heparin  Injectable 5500 Unit(s) IV Push every 6 hours PRN For aPTT less than 40  heparin  Injectable 2500 Unit(s) IV Push every 6 hours PRN For aPTT between 40 - 57        CAPILLARY BLOOD GLUCOSE        I&O's Summary    20 Apr 2018 07:01  -  21 Apr 2018 07:00  --------------------------------------------------------  IN: 723 mL / OUT: 780 mL / NET: -57 mL      PHYSICAL EXAM:  General: WN/WD NAD  Neurology: A&Ox3, nonfocal, [place]GL[place] x 4  Eyes: PERRLA/ EOMI, Gross vision intact  ENT/Neck: Neck supple, trachea midline, No JVD, Gross hearing intact  Respiratory: CTA B/L, No wheezing, rales, rhonchi  CV: mild JVD appreciated, RRR, S1S2, no murmurs, rubs or gallops  Abdominal: Soft, NT, ND +BS,   Extremities: Rt knee normal ROM, without crepitus, no erythema, no effusion, no calor. Unable to ambulate on Rt knee, Lt knee WNL.  Skin: No Rashes, Hematoma, Ecchymosis  Incisions: s/p removal of Rt femoral sheath and R. radial sheath. Both incisions c/d/i.  Tubes: N/A      LABS:                        13.4   10.74 )-----------( 207      ( 21 Apr 2018 09:33 )             40.6     04-21    139  |  101  |  21  ----------------------------<  128<H>  3.5   |  23  |  1.15    Ca    8.5      21 Apr 2018 06:30  Phos  2.6     04-21  Mg     2.3     04-21    TPro  6.5  /  Alb  3.1<L>  /  TBili  0.8  /  DBili  x   /  AST  119<H>  /  ALT  54<H>  /  AlkPhos  77  04-21    PT/INR - ( 21 Apr 2018 09:33 )   PT: 13.2 sec;   INR: 1.16 ratio         PTT - ( 21 Apr 2018 09:33 )  PTT:49.4 sec  CARDIAC MARKERS ( 20 Apr 2018 03:29 )  x     / 5.53 ng/mL / 1346 U/L / x     / 34.9 ng/mL          RADIOLOGY & ADDITIONAL TESTS:    Imaging Personally Reviewed: Pending Rt. Knee xray    Consultant(s) Notes Reviewed:  Cardiology, EP    Care Discussed with Consultants/Other Providers:

## 2018-04-21 NOTE — PROGRESS NOTE ADULT - ASSESSMENT
77M, no hx, pw CP, late AW STEMI.   4/16 - KYRA p LAD  , IABP  Staged PCI D1(90%)  LVEDP elevated .  LV thrombus, EF 25%.     ACS / HFrEF  - ASA/ plavix(plavix due to triple therapy)  - warfarin for LV thrombus  - metop 12.5 BID (limited titration due to hypotension)  - atorva 80  - in light of decr EF, ectopy on tele, late presentation, would favor lifevest. EP and zoll rep to see patient.       DEMARCUS  - improved.   - captopril held for hypotension.   - would start low dose ace, lisinopril 2.5

## 2018-04-21 NOTE — PROGRESS NOTE ADULT - PROBLEM SELECTOR PLAN 5
etiology includes fat emboli w/ recent PCI vs septic arthritis vs diathesis vs prolonged immobilization due to hospitalization  - rectal temp 101.3, will send Bcx, Ucx  - Physical exam non revealing however will obtain Rt knee xray to evaluate for effusion vs diathesis on multiple AC  - will consider ortho consult if Rt knee xray suggestive of effusion for arthrocentesis. etiology includes fat emboli w/ recent PCI vs septic arthritis vs diathesis vs prolonged immobilization due to hospitalization  - rectal temp 101.3, will send Bcx, Ucx, UA  - Physical exam non revealing however will obtain Rt knee xray to evaluate for effusion vs diathesis on multiple AC  - will consider ortho consult if Rt knee xray suggestive of effusion for arthrocentesis.

## 2018-04-21 NOTE — PROGRESS NOTE ADULT - SUBJECTIVE AND OBJECTIVE BOX
Cardiology Progress Note  Reason for Consult, CC:     ACS Evaluation  24H hour events:  No acute events overnight, the patient was seen in bed this morning, denies any ongoing dypnea, chest pain.     MEDICATIONS:  aspirin  chewable 81 milliGRAM(s) Chew daily  clopidogrel Tablet 75 milliGRAM(s) Oral daily  heparin  Infusion.  Unit(s)/Hr IV Continuous <Continuous>  heparin  Injectable 5500 Unit(s) IV Push every 6 hours PRN  heparin  Injectable 2500 Unit(s) IV Push every 6 hours PRN  metoprolol tartrate 12.5 milliGRAM(s) Oral two times a day  warfarin 5 milliGRAM(s) Oral once            atorvastatin 80 milliGRAM(s) Oral at bedtime    potassium acid phosphate/sodium acid phosphate tablet (K-PHOS No. 2) 1 Tablet(s) Oral three times a day with meals        PHYSICAL EXAM:  T(C): 36.9 (04-21-18 @ 04:30), Max: 37.4 (04-20-18 @ 08:25)  HR: 92 (04-21-18 @ 05:56) (91 - 99)  BP: 96/61 (04-21-18 @ 05:56) (87/56 - 109/66)  RR: 18 (04-21-18 @ 04:30) (15 - 18)  SpO2: 95% (04-21-18 @ 04:30) (95% - 98%)  Wt(kg): --  I&O's Summary    20 Apr 2018 07:01  -  21 Apr 2018 07:00  --------------------------------------------------------  IN: 723 mL / OUT: 780 mL / NET: -57 mL        Appearance: Normal	  HEENT:   Normal oral mucosa  Lymphatic: No lymphadenopathy  Cardiovascular: Normal S1 S2,        minimal  JVD,  1/6 systolic murmur,      mild edema  Respiratory: Lungs clear to auscultation	  Psychiatry: Mood & affect appropriate  Gastrointestinal:  Soft, Non-tender	  Skin: No rashes, No ecchymoses, No cyanosis	  Neurologic: Non-focal  Extremities: Normal range of motion, No clubbing, cyanosis   Vascular: warm extremities        LABS:	 	    CBC Full  -  ( 20 Apr 2018 03:29 )  WBC Count : 11.9 K/uL  Hemoglobin : 13.4 g/dL  Hematocrit : 40.5 %  Platelet Count - Automated : 176 K/uL  Mean Cell Volume : 96.1 fl  Mean Cell Hemoglobin : 31.7 pg  Mean Cell Hemoglobin Concentration : 33.0 gm/dL  Auto Neutrophil # : x  Auto Lymphocyte # : x  Auto Monocyte # : x  Auto Eosinophil # : x  Auto Basophil # : x  Auto Neutrophil % : x  Auto Lymphocyte % : x  Auto Monocyte % : x  Auto Eosinophil % : x  Auto Basophil % : x    04-21    139  |  101  |  21  ----------------------------<  128<H>  3.5   |  23  |  1.15  04-20    135  |  101  |  20  ----------------------------<  138<H>  3.8   |  22  |  1.10    Ca    8.5      21 Apr 2018 06:30  Ca    8.5      20 Apr 2018 03:29  Phos  2.6     04-21  Phos  2.2     04-20  Mg     2.3     04-21  Mg     2.1     04-20    TPro  6.5  /  Alb  3.1<L>  /  TBili  0.8  /  DBili  x   /  AST  119<H>  /  ALT  54<H>  /  AlkPhos  77  04-21  TPro  6.0  /  Alb  2.7<L>  /  TBili  0.8  /  DBili  x   /  AST  268<H>  /  ALT  74<H>  /  AlkPhos  51  04-20      proBNP:     TELEMETRY: 	  S  , pvcs, 6 beats wct

## 2018-04-21 NOTE — PROGRESS NOTE ADULT - ASSESSMENT
78 yo M no pmhx a/w late presentation STEMI s/p KYRA prox LAD 99%, with D1 and ramus 90%  cb hypotension requiring IABP which is removed on 4/19 and BP now stable. Pending life vest eval for reduced EF.

## 2018-04-21 NOTE — PROGRESS NOTE ADULT - PROBLEM SELECTOR PLAN 7
DVT PPx: heparin gtt to coumadin, subtherapeutic  Diet: DVT PPx: heparin gtt to coumadin, subtherapeutic  Diet: DASH  Dispo: pending life vest evaluation    Holly James PGY-1  Spectre [phone]  Pager # [phone]/ [phone]

## 2018-04-21 NOTE — PROGRESS NOTE ADULT - PROBLEM SELECTOR PLAN 1
- Continue ASA and start Plavix today  - Nuha downtrending  - IABP removed  - Continue high intensity statin  - will d/c captopril in s/o hypotension, c/w metoprolol tartrate 12.5mg BID, uptitrate as tolerated, start lisinopril 2.5mg daily as per cards   - Continue to monitor for mechanical complications of late MI  - F/u TSH, lipid, HgA1c

## 2018-04-22 ENCOUNTER — TRANSCRIPTION ENCOUNTER (OUTPATIENT)
Age: 78
End: 2018-04-22

## 2018-04-22 LAB
ALBUMIN SERPL ELPH-MCNC: 3 G/DL — LOW (ref 3.3–5)
ALP SERPL-CCNC: 135 U/L — HIGH (ref 40–120)
ALT FLD-CCNC: 91 U/L — HIGH (ref 10–45)
ANION GAP SERPL CALC-SCNC: 15 MMOL/L — SIGNIFICANT CHANGE UP (ref 5–17)
APTT BLD: 111.8 SEC — HIGH (ref 27.5–37.4)
APTT BLD: 53.3 SEC — HIGH (ref 27.5–37.4)
APTT BLD: 81.6 SEC — HIGH (ref 27.5–37.4)
AST SERPL-CCNC: 126 U/L — HIGH (ref 10–40)
BILIRUB SERPL-MCNC: 0.6 MG/DL — SIGNIFICANT CHANGE UP (ref 0.2–1.2)
BUN SERPL-MCNC: 21 MG/DL — SIGNIFICANT CHANGE UP (ref 7–23)
CALCIUM SERPL-MCNC: 8.3 MG/DL — LOW (ref 8.4–10.5)
CHLORIDE SERPL-SCNC: 101 MMOL/L — SIGNIFICANT CHANGE UP (ref 96–108)
CO2 SERPL-SCNC: 23 MMOL/L — SIGNIFICANT CHANGE UP (ref 22–31)
CREAT SERPL-MCNC: 1.13 MG/DL — SIGNIFICANT CHANGE UP (ref 0.5–1.3)
CULTURE RESULTS: NO GROWTH — SIGNIFICANT CHANGE UP
GLUCOSE SERPL-MCNC: 144 MG/DL — HIGH (ref 70–99)
HCT VFR BLD CALC: 39.9 % — SIGNIFICANT CHANGE UP (ref 39–50)
HGB BLD-MCNC: 13.1 G/DL — SIGNIFICANT CHANGE UP (ref 13–17)
INR BLD: 1.27 RATIO — HIGH (ref 0.88–1.16)
MAGNESIUM SERPL-MCNC: 2.2 MG/DL — SIGNIFICANT CHANGE UP (ref 1.6–2.6)
MCHC RBC-ENTMCNC: 31 PG — SIGNIFICANT CHANGE UP (ref 27–34)
MCHC RBC-ENTMCNC: 32.8 GM/DL — SIGNIFICANT CHANGE UP (ref 32–36)
MCV RBC AUTO: 94.3 FL — SIGNIFICANT CHANGE UP (ref 80–100)
PHOSPHATE SERPL-MCNC: 3.2 MG/DL — SIGNIFICANT CHANGE UP (ref 2.5–4.5)
PLATELET # BLD AUTO: 230 K/UL — SIGNIFICANT CHANGE UP (ref 150–400)
POTASSIUM SERPL-MCNC: 3.3 MMOL/L — LOW (ref 3.5–5.3)
POTASSIUM SERPL-SCNC: 3.3 MMOL/L — LOW (ref 3.5–5.3)
PROT SERPL-MCNC: 6.6 G/DL — SIGNIFICANT CHANGE UP (ref 6–8.3)
PROTHROM AB SERPL-ACNC: 14.4 SEC — HIGH (ref 10–13.1)
RBC # BLD: 4.23 M/UL — SIGNIFICANT CHANGE UP (ref 4.2–5.8)
RBC # FLD: 13.4 % — SIGNIFICANT CHANGE UP (ref 10.3–14.5)
SODIUM SERPL-SCNC: 139 MMOL/L — SIGNIFICANT CHANGE UP (ref 135–145)
SPECIMEN SOURCE: SIGNIFICANT CHANGE UP
WBC # BLD: 9.59 K/UL — SIGNIFICANT CHANGE UP (ref 3.8–10.5)
WBC # FLD AUTO: 9.59 K/UL — SIGNIFICANT CHANGE UP (ref 3.8–10.5)

## 2018-04-22 PROCEDURE — 73562 X-RAY EXAM OF KNEE 3: CPT | Mod: 26,RT

## 2018-04-22 PROCEDURE — 99233 SBSQ HOSP IP/OBS HIGH 50: CPT

## 2018-04-22 PROCEDURE — 99233 SBSQ HOSP IP/OBS HIGH 50: CPT | Mod: GC

## 2018-04-22 RX ORDER — POTASSIUM CHLORIDE 20 MEQ
40 PACKET (EA) ORAL ONCE
Qty: 0 | Refills: 0 | Status: COMPLETED | OUTPATIENT
Start: 2018-04-22 | End: 2018-04-22

## 2018-04-22 RX ORDER — WARFARIN SODIUM 2.5 MG/1
7 TABLET ORAL ONCE
Qty: 0 | Refills: 0 | Status: COMPLETED | OUTPATIENT
Start: 2018-04-22 | End: 2018-04-22

## 2018-04-22 RX ADMIN — Medication 1 TABLET(S): at 18:17

## 2018-04-22 RX ADMIN — Medication 40 MILLIEQUIVALENT(S): at 12:20

## 2018-04-22 RX ADMIN — Medication 1 TABLET(S): at 08:05

## 2018-04-22 RX ADMIN — HEPARIN SODIUM 1300 UNIT(S)/HR: 5000 INJECTION INTRAVENOUS; SUBCUTANEOUS at 01:44

## 2018-04-22 RX ADMIN — ATORVASTATIN CALCIUM 80 MILLIGRAM(S): 80 TABLET, FILM COATED ORAL at 22:28

## 2018-04-22 RX ADMIN — Medication 12.5 MILLIGRAM(S): at 05:01

## 2018-04-22 RX ADMIN — CLOPIDOGREL BISULFATE 75 MILLIGRAM(S): 75 TABLET, FILM COATED ORAL at 12:20

## 2018-04-22 RX ADMIN — Medication 12.5 MILLIGRAM(S): at 18:17

## 2018-04-22 RX ADMIN — HEPARIN SODIUM 1300 UNIT(S)/HR: 5000 INJECTION INTRAVENOUS; SUBCUTANEOUS at 10:01

## 2018-04-22 RX ADMIN — HEPARIN SODIUM 2500 UNIT(S): 5000 INJECTION INTRAVENOUS; SUBCUTANEOUS at 01:46

## 2018-04-22 RX ADMIN — Medication 81 MILLIGRAM(S): at 12:20

## 2018-04-22 RX ADMIN — LISINOPRIL 2.5 MILLIGRAM(S): 2.5 TABLET ORAL at 05:01

## 2018-04-22 RX ADMIN — WARFARIN SODIUM 7 MILLIGRAM(S): 2.5 TABLET ORAL at 22:28

## 2018-04-22 RX ADMIN — HEPARIN SODIUM 1200 UNIT(S)/HR: 5000 INJECTION INTRAVENOUS; SUBCUTANEOUS at 18:18

## 2018-04-22 RX ADMIN — Medication 1 TABLET(S): at 12:20

## 2018-04-22 NOTE — PROGRESS NOTE ADULT - SUBJECTIVE AND OBJECTIVE BOX
24H hour events: No acute events overnight. Feeling well.     MEDICATIONS:  aspirin  chewable 81 milliGRAM(s) Chew daily  clopidogrel Tablet 75 milliGRAM(s) Oral daily  heparin  Infusion.  Unit(s)/Hr IV Continuous <Continuous>  heparin  Injectable 5500 Unit(s) IV Push every 6 hours PRN  heparin  Injectable 2500 Unit(s) IV Push every 6 hours PRN  lisinopril 2.5 milliGRAM(s) Oral daily  metoprolol tartrate 12.5 milliGRAM(s) Oral two times a day  warfarin 7 milliGRAM(s) Oral once  acetaminophen   Tablet. 650 milliGRAM(s) Oral every 6 hours PRN  atorvastatin 80 milliGRAM(s) Oral at bedtime  potassium acid phosphate/sodium acid phosphate tablet (K-PHOS No. 2) 1 Tablet(s) Oral three times a day with meals    REVIEW OF SYSTEMS:  Complete 10point ROS negative except as documented above.    PHYSICAL EXAM:  T(C): 37.2 (04-22-18 @ 11:47), Max: 37.5 (04-22-18 @ 04:35)  HR: 92 (04-22-18 @ 11:47) (84 - 92)  BP: 102/70 (04-22-18 @ 11:47) (96/62 - 102/70)  RR: 18 (04-22-18 @ 11:47) (18 - 19)  SpO2: 98% (04-22-18 @ 11:47) (95% - 98%)  Wt(kg): --  I&O's Summary    21 Apr 2018 07:01  -  22 Apr 2018 07:00  --------------------------------------------------------  IN: 1112 mL / OUT: 800 mL / NET: 312 mL    22 Apr 2018 07:01  -  22 Apr 2018 14:45  --------------------------------------------------------  IN: 480 mL / OUT: 0 mL / NET: 480 mL    Appearance: Normal	  HEENT:   Normal oral mucosa, PERRL, EOMI	  Lymphatic: No lymphadenopathy  Cardiovascular: Normal S1 S2, No JVD, No murmurs, No edema  Respiratory: Lungs clear to auscultation	  Psychiatry: A & O x 3, Mood & affect appropriate  Gastrointestinal:  Soft, Non-tender, + BS	  Skin: No rashes, No ecchymoses, No cyanosis	  Neurologic: Non-focal  Extremities: Normal range of motion, No clubbing, cyanosis or edema  Vascular: Peripheral pulses palpable 2+ bilaterally    LABS:	 	    CBC Full  -  ( 22 Apr 2018 09:50 )  WBC Count : 9.59 K/uL  Hemoglobin : 13.1 g/dL  Hematocrit : 39.9 %  Platelet Count - Automated : 230 K/uL  Mean Cell Volume : 94.3 fl  Mean Cell Hemoglobin : 31.0 pg  Mean Cell Hemoglobin Concentration : 32.8 gm/dL  Auto Neutrophil # : x  Auto Lymphocyte # : x  Auto Monocyte # : x  Auto Eosinophil # : x  Auto Basophil # : x  Auto Neutrophil % : x  Auto Lymphocyte % : x  Auto Monocyte % : x  Auto Eosinophil % : x  Auto Basophil % : x    04-22    139  |  101  |  21  ----------------------------<  144<H>  3.3<L>   |  23  |  1.13  04-21    139  |  101  |  21  ----------------------------<  128<H>  3.5   |  23  |  1.15    Ca    8.3<L>      22 Apr 2018 07:43  Ca    8.5      21 Apr 2018 06:30  Phos  3.2     04-22  Phos  2.6     04-21  Mg     2.2     04-22  Mg     2.3     04-21    TPro  6.6  /  Alb  3.0<L>  /  TBili  0.6  /  DBili  x   /  AST  126<H>  /  ALT  91<H>  /  AlkPhos  135<H>  04-22  TPro  6.5  /  Alb  3.1<L>  /  TBili  0.8  /  DBili  x   /  AST  119<H>  /  ALT  54<H>  /  AlkPhos  77  04-21    TELEMETRY: sinus  	    ECG:  	  RADIOLOGY:  OTHER: 	    PREVIOUS DIAGNOSTIC TESTING:    [ ] Echocardiogram:  [ ]  Catheterization:  [ ] Stress Test:  	  	  ASSESSMENT/PLAN: 	    Danish Mccloud MD   [id number]

## 2018-04-22 NOTE — DISCHARGE NOTE ADULT - CARE PROVIDER_API CALL
Trae Dillard), Internal Medicine  [address] [place] [address]  Phone: [phone]  Fax: [phone]    Jeri Bower), Cardiac Electrophysiology; Cardiovascular Disease; Internal Medicine  [address] [place] [id number]  Phone: [phone]  Fax: [phone]

## 2018-04-22 NOTE — DISCHARGE NOTE ADULT - NS AS DC VTE INSTRUCTION
Coumadin/Warfarin - Dietary Advice.../Coumadin/Warfarin - Follow-up monitoring.../Coumadin/Warfarin - Compliance.../Coumadin/Warfarin - Potential for adverse drug reactions and interactions Coumadin/Warfarin - Compliance.../Coumadin/Warfarin - Dietary Advice.../Coumadin/Warfarin - Follow-up monitoring.../Coumadin/Warfarin - Potential for adverse drug reactions and interactions

## 2018-04-22 NOTE — PROGRESS NOTE ADULT - PROBLEM SELECTOR PLAN 5
etiology includes fat emboli w/ recent PCI vs septic arthritis vs diathesis vs prolonged immobilization due to hospitalization  - rectal temp 101.3, will send Bcx, Ucx, UA  - Physical exam non revealing however will obtain Rt knee xray to evaluate for effusion vs diathesis on multiple AC  - will consider ortho consult if Rt knee xray suggestive of effusion for arthrocentesis.

## 2018-04-22 NOTE — PROGRESS NOTE ADULT - ASSESSMENT
77M, no hx, pw CP, late AW STEMI with 4/16 - KYRA p LAD  , IABP and then Staged PCI D1(90%). Course complicated by LV thrombus, EF 25%.     1. CAD with AWSTEMI and resulting HFrEF wtih EF 25%  - C/w ASA and Plavix   - warfarin for LV thrombus with heparin bridge, INR goal 2-3  - c/w metop 12.5 BID and lisinopril 2.5 daily at current doses given marginal BPs  - c/w atorvastin   - in light of decr EF, ectopy on tele, late presentation, would favor lifevest. EP and zoll rep to see patient.     Please call the covering cardiology fellow at [phone] if any further questions should arise.

## 2018-04-22 NOTE — DISCHARGE NOTE ADULT - HOSPITAL COURSE
77M with no significant past medical history presented with chest pain to OSH found to have late presentation anterior wall STEMI. He was transferred to [facility] for cardiac catheretization. On 4/16 cath was performed with KYRA placed at [facility] with notable 90% lesions in D1 and ramus. He had IABP placed at that time for hypotension. He was given lasix for elevated LVEDP and evidence of heart failure and remained euvolemic thereafter without further lasix. He subsequently had a staged cath w/ KYRA placed in D1. IABP was removed without evidence of bleeding. Betablocker and ace inhibitor were added after patient was transferred to medicine floors and tolerated these medications well. Patient also remained on heparin gtts given LV thrombus noted on TTE showing EF 20-25%; he was started on coumadin and discharged with therapeutic INR with f/u appts made with [facility] ACU clinic and [place] [facility] as well as cardiology appointment. Given severely reduced EF he was given life vest on discharge. 77M with no significant past medical history presented with chest pain to OSH found to have late presentation anterior wall STEMI. He was transferred to [facility] for cardiac catheretization. On 4/16 cath was performed with KYRA placed at [facility] with notable 90% lesions in D1 and ramus. He had IABP placed at that time for hypotension. He was given lasix for elevated LVEDP and evidence of heart failure and remained euvolemic thereafter without further lasix. He subsequently had a staged cath w/ KYRA placed in D1. IABP was removed without evidence of bleeding. Betablocker and ace inhibitor were added after patient was transferred to medicine floors and tolerated these medications well. Patient also remained on heparin gtts given LV thrombus noted on TTE showing EF 20-25%; he was started on coumadin and discharged with therapeutic INR with f/u appts made with [facility] ACU clinic and [place] clinic as well as cardiology appointment. Given severely reduced EF he was given life vest with confirmed fitting at home at day of discharge with Michelle ( life vest representative Ph:[phone]).

## 2018-04-22 NOTE — DISCHARGE NOTE ADULT - ADDITIONAL INSTRUCTIONS
PMD: You have an appointment with [facility] on 5/7/18 at 2:30 PM. Located at [address] [place] [address] on the second floor. Call [phone] if any questions. First visit costs $150 for sliding scale payment. PMD: You have an appointment with [facility] on 5/7/18 at 2:30 PM. Located at [address] [place] [address] on the second floor. Call [phone] if any questions. First visit costs $150 for sliding scale payment.  Transthoracic echo 7/23 at 10:15 am   Cardiology appointment with Dr. Bower at 1:30 pm on 7/23 Transthoracic echo 7/23 at 10:15 am   Cardiology appointment with Dr. Bower at 1:30 pm on 7/23  Please walk in to ACU clinic by Monday or Tuesday 4/30 or 5/1 for INR check and post discharge follow up 1pm.

## 2018-04-22 NOTE — PROGRESS NOTE ADULT - PROBLEM SELECTOR PLAN 2
satting 94% on RA; EF 20-25% on TTE  - LVEDP 28 and estimated EF 25%  - Continue to monitor I/Os closely, continue to keep net negative  - Cardiology recommended life vest for reduced EF. EP following

## 2018-04-22 NOTE — PROGRESS NOTE ADULT - SUBJECTIVE AND OBJECTIVE BOX
Kim Howard, PGY3   Pager: [phone]   4/22/18 7AM-12Pm    Patient is a 77y old  Male who presents with a chief complaint of Chest pain/STEMI (18 Apr 2018 15:15)      SUBJECTIVE / OVERNIGHT EVENTS: No acute events overnight. Pt seen and examined. Continues to endorse mild pain of R knee, though improved since yesterday. Denies any subjective fevers/chills, no CP, palpitations, SOB, urinary complaints, no abdominal pain, n/v/d. Remained afebrile overnight. No other complaints.     Tele events: sinus, 80-90s     MEDICATIONS  (STANDING):  aspirin  chewable 81 milliGRAM(s) Chew daily  atorvastatin 80 milliGRAM(s) Oral at bedtime  clopidogrel Tablet 75 milliGRAM(s) Oral daily  heparin  Infusion.  Unit(s)/Hr (12 mL/Hr) IV Continuous <Continuous>  lisinopril 2.5 milliGRAM(s) Oral daily  metoprolol tartrate 12.5 milliGRAM(s) Oral two times a day  potassium acid phosphate/sodium acid phosphate tablet (K-PHOS No. 2) 1 Tablet(s) Oral three times a day with meals  potassium chloride    Tablet ER 40 milliEquivalent(s) Oral once    MEDICATIONS  (PRN):  acetaminophen   Tablet. 650 milliGRAM(s) Oral every 6 hours PRN Moderate Pain (4 - 6)  heparin  Injectable 5500 Unit(s) IV Push every 6 hours PRN For aPTT less than 40  heparin  Injectable 2500 Unit(s) IV Push every 6 hours PRN For aPTT between 40 - 57    ICU Vital Signs Last 24 Hrs  T(C): 37.5 (22 Apr 2018 04:35), Max: 37.6 (21 Apr 2018 11:18)  T(F): 99.5 (22 Apr 2018 04:35), Max: 99.7 (21 Apr 2018 11:18)  HR: 92 (22 Apr 2018 04:35) (84 - 92)  BP: 101/68 (22 Apr 2018 04:35) (94/65 - 101/68)  BP(mean): --  ABP: --  ABP(mean): --  RR: 18 (22 Apr 2018 04:35) (18 - 19)  SpO2: 95% (22 Apr 2018 04:35) (95% - 97%)    I&O's Summary    21 Apr 2018 07:01  -  22 Apr 2018 07:00  --------------------------------------------------------  IN: 1112 mL / OUT: 800 mL / NET: 312 mL    22 Apr 2018 07:01  -  22 Apr 2018 10:54  --------------------------------------------------------  IN: 240 mL / OUT: 0 mL / NET: 240 mL    PHYSICAL EXAM:  GENERAL: NAD, well-groomed, well-developed  HEAD:  Atraumatic, Normocephalic  EYES: EOMI, PERRLA, conjunctiva and sclera clear  ENMT: No tonsillar erythema, exudates, or enlargement; Moist mucous membranes, Good dentition, No lesions  NECK: Supple, No JVD, Normal thyroid  NERVOUS SYSTEM:  Alert & Oriented X3, Good concentration; Motor Strength 5/5 B/L upper and lower extremities; DTRs 2+ intact and symmetric  CHEST/LUNG: Clear to percussion bilaterally; No rales, rhonchi, wheezing, or rubs  HEART: Regular rate and rhythm; No murmurs, rubs, or gallops  ABDOMEN: Soft, Nontender, Nondistended; Bowel sounds present  EXTREMITIES:  2+ Peripheral Pulses, No clubbing, cyanosis, or edema  LYMPH: No lymphadenopathy noted  SKIN: No rashes or lesions    LABS                        13.1   9.59  )-----------( 230      ( 22 Apr 2018 09:50 )             39.9     22 Apr 2018 07:43    139    |  101    |  21     ----------------------------<  144    3.3     |  23     |  1.13     Ca    8.3        22 Apr 2018 07:43  Phos  3.2       22 Apr 2018 07:43  Mg     2.2       22 Apr 2018 07:43    TPro  6.6    /  Alb  3.0    /  TBili  0.6    /  DBili  x      /  AST  126    /  ALT  91     /  AlkPhos  135    22 Apr 2018 07:43    PT/INR - ( 21 Apr 2018 09:33 )   PT: 13.2 sec;   INR: 1.16 ratio         PTT - ( 22 Apr 2018 07:43 )  PTT:81.6 sec          RADIOLOGY & ADDITIONAL TESTS:    Imaging Personally Reviewed: Pending Rt. Knee xray    Consultant(s) Notes Reviewed:  Cardiology, EP    Care Discussed with Consultants/Other Providers:

## 2018-04-22 NOTE — DISCHARGE NOTE ADULT - CARE PLAN
Principal Discharge DX:	STEMI involving left anterior descending coronary artery  Goal:	continue taking medications and f/u with PMD and cardiololgy  Assessment and plan of treatment:	You must never miss your aspirin or plavix as this may cause in stent thrombosis. You require these medications maintain the patency of the stents that were placed after your heart attack. Please continue taking metoprolol and lisinopril as well as atorvastatin. Metoprolol and lisinopril control heart rate and blood pressure as well as prevent remodeling of heart tissue after heart attacks. Atorvastatin lowers cholesterol and prevents future MI.  Secondary Diagnosis:	Heart failure with reduced ejection fraction  Assessment and plan of treatment:	You are to be discharged with a life vest as this has been proven to be beneficial to those with severely reduced EF. You will have your echo repeated to assess for improvement in cardiac function. You were trained on how to use the life vest. If any issues please reach out to your cardiologist.  Secondary Diagnosis:	Left ventricular thrombus with acute MI  Assessment and plan of treatment:	Please continue taking coumadin; please follow up with a primary care doctor within 1 week of discharge for INR check. The [facility] has sliding scale payment for new patients without insurance for $150. The [facility] sliding scale payment is $75. There is a  that may be able to assist you with any issues obtaining insurance at [facility]. Her name is Sharmin and her number is [phone]. Principal Discharge DX:	STEMI involving left anterior descending coronary artery  Goal:	continue taking medications and f/u with PMD and cardiololgy  Assessment and plan of treatment:	You must never miss your aspirin or plavix as this may cause in stent thrombosis. You require these medications maintain the patency of the stents that were placed after your heart attack. Please continue taking metoprolol and lisinopril as well as atorvastatin. Metoprolol and lisinopril control heart rate and blood pressure as well as prevent remodeling of heart tissue after heart attacks. Atorvastatin lowers cholesterol and prevents future MI.  Secondary Diagnosis:	Heart failure with reduced ejection fraction  Assessment and plan of treatment:	You are to be discharged with a life vest as this has been proven to be beneficial to those with severely reduced EF. You will have your echo repeated to assess for improvement in cardiac function. You were trained on how to use the life vest. If any issues please reach out to your cardiologist.  Secondary Diagnosis:	Left ventricular thrombus with acute MI  Assessment and plan of treatment:	Please continue taking coumadin; please follow up with a primary care doctor within 1 week of discharge for INR check. You should attend the walk in clinic at [facility] for INR check by Monday. There is a  that may be able to assist you with any issues obtaining insurance at [facility]. Her name is Sharmin and her number is [phone].

## 2018-04-22 NOTE — PHYSICAL THERAPY INITIAL EVALUATION ADULT - ADDITIONAL COMMENTS
Pt lives on 5th floor walk up, upon d/c pt will be staying with his friend (no steps to enter building, elevator to apartment). Prior to admission, pt was independent with all functional mobility and ADLs without AD.

## 2018-04-22 NOTE — DISCHARGE NOTE ADULT - MEDICATION SUMMARY - MEDICATIONS TO TAKE
I will START or STAY ON the medications listed below when I get home from the hospital:    aspirin 81 mg oral tablet, chewable  -- 1 tab(s) by mouth once a day  -- Indication: For Myocardial Infarction    lisinopril 2.5 mg oral tablet  -- 1 tab(s) by mouth once a day  -- Indication: For Myocardial Infarction    Coumadin 5 mg oral tablet  -- 1 tab(s) by mouth once a day (at bedtime)   Follow up with your PMD or cardiologist for INR check within 1 week  -- Do not take this drug if you are pregnant.  It is very important that you take or use this exactly as directed.  Do not skip doses or discontinue unless directed by your doctor.  Obtain medical advice before taking any non-prescription drugs as some may affect the action of this medication.    -- Indication: For Left ventricular thrombus     atorvastatin 80 mg oral tablet  -- 1 tab(s) by mouth once a day (at bedtime)  -- Indication: For Myocardial Infarction    clopidogrel 75 mg oral tablet  -- 1 tab(s) by mouth once a day  -- Indication: For Myocardial Infarction with stents placed    metoprolol succinate 25 mg oral tablet, extended release  -- 1 tab(s) by mouth once a day  -- Indication: For Myocardial Infarction

## 2018-04-22 NOTE — PHYSICAL THERAPY INITIAL EVALUATION ADULT - PRECAUTIONS/LIMITATIONS, REHAB EVAL
cardiac precautions/He subsequently had a staged cath w/ KYRA placed in D1. IABP was removed without evidence of bleeding. TTE was notable for LV thrombus and EF 20-25%. He has been maintained on heparin. CT Chest 4/20: The heart is normal in size. No acute consolidation could be seen on the current study. Intra-aortic balloon pump was removed. No pneumothorax.

## 2018-04-22 NOTE — DISCHARGE NOTE ADULT - CARE PROVIDERS DIRECT ADDRESSES
[email or web address]

## 2018-04-22 NOTE — DISCHARGE NOTE ADULT - PLAN OF CARE
continue taking medications and f/u with PMD and cardiololgy You must never miss your aspirin or plavix as this may cause in stent thrombosis. You require these medications maintain the patency of the stents that were placed after your heart attack. Please continue taking metoprolol and lisinopril as well as atorvastatin. Metoprolol and lisinopril control heart rate and blood pressure as well as prevent remodeling of heart tissue after heart attacks. Atorvastatin lowers cholesterol and prevents future MI. You are to be discharged with a life vest as this has been proven to be beneficial to those with severely reduced EF. You will have your echo repeated to assess for improvement in cardiac function. You were trained on how to use the life vest. If any issues please reach out to your cardiologist. Please continue taking coumadin; please follow up with a primary care doctor within 1 week of discharge for INR check. The [facility] has sliding scale payment for new patients without insurance for $150. The [facility] sliding scale payment is $75. There is a  that may be able to assist you with any issues obtaining insurance at [facility]. Her name is Sharmin and her number is [phone]. Please continue taking coumadin; please follow up with a primary care doctor within 1 week of discharge for INR check. You should attend the walk in clinic at [facility] for INR check by Monday. There is a  that may be able to assist you with any issues obtaining insurance at [facility]. Her name is Sharmin and her number is [phone].

## 2018-04-22 NOTE — DISCHARGE NOTE ADULT - PATIENT PORTAL LINK FT
You can access the [facility], offered by [facility], by registering with the following website: [email or web address]

## 2018-04-22 NOTE — PHYSICAL THERAPY INITIAL EVALUATION ADULT - PERTINENT HX OF CURRENT PROBLEM, REHAB EVAL
77 y.o. M p/w Chest pain to [place], transferred to [facility] for STEMI. Pt reports non radiating from the night before ~5-6 pm. He had associated nausea earlier that day and yesterday evening. His pain was constant and began as 5/10 and progressed to 8/10. On 4/16 initial cath was performed with KYRA placed at [facility] with notable 90% lesions in D1 and ramus. He had IABP placed at that time for hypotension. He was given lasix for elevated LVEDP and evidence of heart failure.

## 2018-04-22 NOTE — DISCHARGE NOTE ADULT - MEDICATION SUMMARY - MEDICATIONS TO CHANGE
none
I will SWITCH the dose or number of times a day I take the medications listed below when I get home from the hospital:  None

## 2018-04-23 DIAGNOSIS — I25.5 ISCHEMIC CARDIOMYOPATHY: ICD-10-CM

## 2018-04-23 LAB
ALBUMIN SERPL ELPH-MCNC: 3 G/DL — LOW (ref 3.3–5)
ALP SERPL-CCNC: 161 U/L — HIGH (ref 40–120)
ALT FLD-CCNC: 89 U/L — HIGH (ref 10–45)
ANION GAP SERPL CALC-SCNC: 14 MMOL/L — SIGNIFICANT CHANGE UP (ref 5–17)
APTT BLD: 83.4 SEC — HIGH (ref 27.5–37.4)
APTT BLD: 87.4 SEC — HIGH (ref 27.5–37.4)
APTT BLD: 99.5 SEC — HIGH (ref 27.5–37.4)
AST SERPL-CCNC: 87 U/L — HIGH (ref 10–40)
BILIRUB SERPL-MCNC: 0.7 MG/DL — SIGNIFICANT CHANGE UP (ref 0.2–1.2)
BUN SERPL-MCNC: 19 MG/DL — SIGNIFICANT CHANGE UP (ref 7–23)
CALCIUM SERPL-MCNC: 7.9 MG/DL — LOW (ref 8.4–10.5)
CHLORIDE SERPL-SCNC: 103 MMOL/L — SIGNIFICANT CHANGE UP (ref 96–108)
CO2 SERPL-SCNC: 24 MMOL/L — SIGNIFICANT CHANGE UP (ref 22–31)
CREAT SERPL-MCNC: 1.16 MG/DL — SIGNIFICANT CHANGE UP (ref 0.5–1.3)
GLUCOSE SERPL-MCNC: 140 MG/DL — HIGH (ref 70–99)
HCT VFR BLD CALC: 35.4 % — LOW (ref 39–50)
HGB BLD-MCNC: 12.2 G/DL — LOW (ref 13–17)
INR BLD: 1.89 RATIO — HIGH (ref 0.88–1.16)
MAGNESIUM SERPL-MCNC: 2.4 MG/DL — SIGNIFICANT CHANGE UP (ref 1.6–2.6)
MCHC RBC-ENTMCNC: 31.2 PG — SIGNIFICANT CHANGE UP (ref 27–34)
MCHC RBC-ENTMCNC: 34.5 GM/DL — SIGNIFICANT CHANGE UP (ref 32–36)
MCV RBC AUTO: 90.5 FL — SIGNIFICANT CHANGE UP (ref 80–100)
PHOSPHATE SERPL-MCNC: 2.8 MG/DL — SIGNIFICANT CHANGE UP (ref 2.5–4.5)
PLATELET # BLD AUTO: 236 K/UL — SIGNIFICANT CHANGE UP (ref 150–400)
POTASSIUM SERPL-MCNC: 3.8 MMOL/L — SIGNIFICANT CHANGE UP (ref 3.5–5.3)
POTASSIUM SERPL-SCNC: 3.8 MMOL/L — SIGNIFICANT CHANGE UP (ref 3.5–5.3)
PROT SERPL-MCNC: 6.6 G/DL — SIGNIFICANT CHANGE UP (ref 6–8.3)
PROTHROM AB SERPL-ACNC: 20.9 SEC — HIGH (ref 9.8–12.7)
RBC # BLD: 3.91 M/UL — LOW (ref 4.2–5.8)
RBC # FLD: 13.5 % — SIGNIFICANT CHANGE UP (ref 10.3–14.5)
SODIUM SERPL-SCNC: 141 MMOL/L — SIGNIFICANT CHANGE UP (ref 135–145)
WBC # BLD: 7.67 K/UL — SIGNIFICANT CHANGE UP (ref 3.8–10.5)
WBC # FLD AUTO: 7.67 K/UL — SIGNIFICANT CHANGE UP (ref 3.8–10.5)

## 2018-04-23 PROCEDURE — 99233 SBSQ HOSP IP/OBS HIGH 50: CPT | Mod: GC

## 2018-04-23 PROCEDURE — 99253 IP/OBS CNSLTJ NEW/EST LOW 45: CPT

## 2018-04-23 RX ORDER — WARFARIN SODIUM 2.5 MG/1
5 TABLET ORAL ONCE
Qty: 0 | Refills: 0 | Status: COMPLETED | OUTPATIENT
Start: 2018-04-23 | End: 2018-04-23

## 2018-04-23 RX ADMIN — LISINOPRIL 2.5 MILLIGRAM(S): 2.5 TABLET ORAL at 06:03

## 2018-04-23 RX ADMIN — Medication 12.5 MILLIGRAM(S): at 06:03

## 2018-04-23 RX ADMIN — Medication 1 TABLET(S): at 09:53

## 2018-04-23 RX ADMIN — HEPARIN SODIUM 1100 UNIT(S)/HR: 5000 INJECTION INTRAVENOUS; SUBCUTANEOUS at 07:19

## 2018-04-23 RX ADMIN — HEPARIN SODIUM 1100 UNIT(S)/HR: 5000 INJECTION INTRAVENOUS; SUBCUTANEOUS at 14:37

## 2018-04-23 RX ADMIN — ATORVASTATIN CALCIUM 80 MILLIGRAM(S): 80 TABLET, FILM COATED ORAL at 21:18

## 2018-04-23 RX ADMIN — WARFARIN SODIUM 5 MILLIGRAM(S): 2.5 TABLET ORAL at 21:18

## 2018-04-23 RX ADMIN — CLOPIDOGREL BISULFATE 75 MILLIGRAM(S): 75 TABLET, FILM COATED ORAL at 11:50

## 2018-04-23 RX ADMIN — HEPARIN SODIUM 1100 UNIT(S)/HR: 5000 INJECTION INTRAVENOUS; SUBCUTANEOUS at 00:32

## 2018-04-23 RX ADMIN — Medication 81 MILLIGRAM(S): at 11:50

## 2018-04-23 RX ADMIN — Medication 1 TABLET(S): at 13:51

## 2018-04-23 NOTE — PROGRESS NOTE ADULT - ASSESSMENT
77M no pmhx a/w late presentation AWSTEMI s/p KYRA prox LAD 99%, with D1 and ramus 90%  cb hypotension requiring IABP which is removed on 4/19 and BP now stable, with LV thrombus on heparin to coumadin bridge, pending life vest eval for reduced EF.

## 2018-04-23 NOTE — PROGRESS NOTE ADULT - PROBLEM SELECTOR PLAN 1
CAD with AWSTEMI and resulting HFrEF wtih EF 20-25%  - continue tele  - continue Metoprolol 12.5 mg BID, ASA/ Plavix/ Lipitor   - pt was seen by Javier nichols today; given information of life vest/ payment options  - EP will follow CAD with AWSTEMI and resulting HFrEF with EF 20-25%  - continue tele  - continue Metoprolol 12.5 mg BID, ASA/ Plavix/ Lipitor   - pt was seen by Javier nichols today; given information of life vest/ payment options  - pt to follow up with repeat Echo 3 months after post PCI to reassess EF.  - EP will follow

## 2018-04-23 NOTE — PROGRESS NOTE ADULT - SUBJECTIVE AND OBJECTIVE BOX
Cathy Go MD   PGY 2  Pager [phone]  Page [phone] or [phone] after 7 pm       Patient is a 77y old  Male who presents with a chief complaint of Chest pain/STEMI (2018 10:24)      INTERVAL HPI/OVERNIGHT EVENTS:        REVIEW OF SYSTEMS:  CONSTITUTIONAL: No fever, chills  ENMT:  No difficulty hearing, no change in vision  NECK: No pain or stiffness  RESPIRATORY: No cough, SOB  CARDIOVASCULAR: No chest pain, palpitations  GASTROINTESTINAL: No abdominal pain. No nausea, vomiting, or diarrhea  GENITOURINARY: No dysuria  NEUROLOGICAL: No HA  SKIN: No itching, burning, rashes, or lesions   LYMPH NODES: No enlarged glands  ENDOCRINE: No heat or cold intolerance; No hair loss  MUSCULOSKELETAL: No joint pain or swelling; No muscle, back, or extremity pain  PSYCHIATRIC: No depression, anxiety  HEME/LYMPH: No easy bruising, or bleeding gums    T(C): 36.7 (18 @ 05:23), Max: 37.2 (18 @ 11:47)  HR: 87 (18 @ 05:23) (87 - 92)  BP: 92/60 (18 @ 05:52) (91/50 - 104/72)  RR: 18 (18 @ 05:23) (18 - 18)  SpO2: 97% (18 @ 05:23) (96% - 98%)  Wt(kg): --Vital Signs Last 24 Hrs  T(C): 36.7 (2018 05:23), Max: 37.2 (2018 11:47)  T(F): 98.1 (2018 05:23), Max: 98.9 (2018 11:47)  HR: 87 (2018 05:23) (87 - 92)  BP: 92/60 (2018 05:52) (91/50 - 104/72)  BP(mean): --  RR: 18 (2018 05:23) (18 - 18)  SpO2: 97% (2018 05:23) (96% - 98%)    PHYSICAL EXAM:  GENERAL: NAD, well-groomed, well-developed  HEAD:  Atraumatic, Normocephalic  EYES: EOMI, PERRLA, conjunctiva and sclera clear  ENMT: No tonsillar erythema, exudates, or enlargement; Moist mucous membranes, Good dentition, No lesions  NECK: Supple, No JVD, Normal thyroid  NERVOUS SYSTEM:  Alert & Oriented X3, Good concentration; Motor Strength 5/5 B/L upper and lower extremities; DTRs 2+ intact and symmetric  CHEST/LUNG: Clear to percussion bilaterally; No rales, rhonchi, wheezing, or rubs  HEART: Regular rate and rhythm; No murmurs, rubs, or gallops  ABDOMEN: Soft, Nontender, Nondistended; Bowel sounds present  EXTREMITIES:  2+ Peripheral Pulses, No clubbing, cyanosis, or edema  LYMPH: No lymphadenopathy noted  SKIN: No rashes or lesions    Consultant(s) Notes Reviewed:  [x ] YES  [ ] NO  Care Discussed with Consultants/Other Providers [ x] YES  [ ] NO    LABS:                        13.1   9.59  )-----------( 230      ( 2018 09:50 )             39.9     -    141  |  103  |  19  ----------------------------<  140<H>  3.8   |  24  |  1.16    Ca    7.9<L>      2018 06:25  Phos  2.8     -  Mg     2.4         TPro  6.6  /  Alb  3.0<L>  /  TBili  0.7  /  DBili  x   /  AST  87<H>  /  ALT  89<H>  /  AlkPhos  161<H>  23    PT/INR - ( 2018 06:36 )   PT: 20.9 sec;   INR: 1.89 ratio         PTT - ( 2018 06:36 )  PTT:87.4 sec  Urinalysis Basic - ( 2018 17:07 )    Color: Yellow / Appearance: Turbid / S.022 / pH: x  Gluc: x / Ketone: Negative  / Bili: Negative / Urobili: Negative mg/dL   Blood: x / Protein: 30 mg/dL / Nitrite: Negative   Leuk Esterase: Negative / RBC: 0 /HPF / WBC 5 /HPF   Sq Epi: x / Non Sq Epi: 4 /HPF / Bacteria: Negative      CAPILLARY BLOOD GLUCOSE            Urinalysis Basic - ( 2018 17:07 )    Color: Yellow / Appearance: Turbid / S.022 / pH: x  Gluc: x / Ketone: Negative  / Bili: Negative / Urobili: Negative mg/dL   Blood: x / Protein: 30 mg/dL / Nitrite: Negative   Leuk Esterase: Negative / RBC: 0 /HPF / WBC 5 /HPF   Sq Epi: x / Non Sq Epi: 4 /HPF / Bacteria: Negative        RADIOLOGY & ADDITIONAL TESTS:    Imaging Personally Reviewed:  [ ] YES  [ ] NO Cathy Go MD   PGY 2  Pager [phone]  Page [phone] or [phone] after 7 pm       Patient is a 77y old  Male who presents with a chief complaint of Chest pain/STEMI (2018 10:24)      INTERVAL HPI/OVERNIGHT EVENTS: afebrile and HD stable overnight. No complaints this morning. Pt observed ambulating. No CP, no SOB, no cough, no abd pain. 2 episodes watery diarrhea yesterday; none today.         REVIEW OF SYSTEMS:  CONSTITUTIONAL: No fever, chills  ENMT:  No difficulty hearing, no change in vision  NECK: No pain or stiffness  RESPIRATORY: No cough, SOB  CARDIOVASCULAR: No chest pain, palpitations  GASTROINTESTINAL: No abdominal pain. No nausea, vomiting, or diarrhea  GENITOURINARY: No dysuria  NEUROLOGICAL: No HA  SKIN: No itching, burning, rashes, or lesions   LYMPH NODES: No enlarged glands  ENDOCRINE: No heat or cold intolerance; No hair loss  MUSCULOSKELETAL: No joint pain or swelling; No muscle, back, or extremity pain  PSYCHIATRIC: No depression, anxiety  HEME/LYMPH: No easy bruising, or bleeding gums    T(C): 36.7 (18 @ 05:23), Max: 37.2 (18 @ 11:47)  HR: 87 (18 @ 05:23) (87 - 92)  BP: 92/60 (18 @ 05:52) (91/50 - 104/72)  RR: 18 (18 @ 05:23) (18 - 18)  SpO2: 97% (18 @ 05:23) (96% - 98%)  Wt(kg): --Vital Signs Last 24 Hrs  T(C): 36.7 (2018 05:23), Max: 37.2 (2018 11:47)  T(F): 98.1 (2018 05:23), Max: 98.9 (2018 11:47)  HR: 87 (2018 05:23) (87 - 92)  BP: 92/60 (2018 05:52) (91/50 - 104/72)  BP(mean): --  RR: 18 (2018 05:23) (18 - 18)  SpO2: 97% (2018 05:23) (96% - 98%)    PHYSICAL EXAM:  GENERAL: NAD, well-groomed, well-developed  HEAD:  Atraumatic, Normocephalic  EYES: EOMI, PERRLA, conjunctiva and sclera clear  ENMT: No tonsillar erythema, exudates, or enlargement; Moist mucous membranes, Good dentition, No lesions  NECK: Supple, No JVD, Normal thyroid  NERVOUS SYSTEM:  Alert & Oriented X3, Good concentration; Motor Strength 5/5 B/L upper and lower extremities; DTRs 2+ intact and symmetric  CHEST/LUNG: Clear to percussion bilaterally; No rales, rhonchi, wheezing, or rubs  HEART: Regular rate and rhythm; No murmurs, rubs, or gallops  ABDOMEN: Soft, Nontender, Nondistended; Bowel sounds present  EXTREMITIES:  2+ Peripheral Pulses, No clubbing, cyanosis, or edema  LYMPH: No lymphadenopathy noted  SKIN: No rashes or lesions    Consultant(s) Notes Reviewed:  [x ] YES  [ ] NO  Care Discussed with Consultants/Other Providers [ x] YES  [ ] NO    LABS:                        13.1   9.59  )-----------( 230      ( 2018 09:50 )             39.9     04-23    141  |  103  |  19  ----------------------------<  140<H>  3.8   |  24  |  1.16    Ca    7.9<L>      2018 06:25  Phos  2.8     04-23  Mg     2.4     -23    TPro  6.6  /  Alb  3.0<L>  /  TBili  0.7  /  DBili  x   /  AST  87<H>  /  ALT  89<H>  /  AlkPhos  161<H>  04-23    PT/INR - ( 2018 06:36 )   PT: 20.9 sec;   INR: 1.89 ratio         PTT - ( 2018 06:36 )  PTT:87.4 sec  Urinalysis Basic - ( 2018 17:07 )    Color: Yellow / Appearance: Turbid / S.022 / pH: x  Gluc: x / Ketone: Negative  / Bili: Negative / Urobili: Negative mg/dL   Blood: x / Protein: 30 mg/dL / Nitrite: Negative   Leuk Esterase: Negative / RBC: 0 /HPF / WBC 5 /HPF   Sq Epi: x / Non Sq Epi: 4 /HPF / Bacteria: Negative      CAPILLARY BLOOD GLUCOSE            Urinalysis Basic - ( 2018 17:07 )    Color: Yellow / Appearance: Turbid / S.022 / pH: x  Gluc: x / Ketone: Negative  / Bili: Negative / Urobili: Negative mg/dL   Blood: x / Protein: 30 mg/dL / Nitrite: Negative   Leuk Esterase: Negative / RBC: 0 /HPF / WBC 5 /HPF   Sq Epi: x / Non Sq Epi: 4 /HPF / Bacteria: Negative        RADIOLOGY & ADDITIONAL TESTS:    Imaging Personally Reviewed:  [ ] YES  [ ] NO

## 2018-04-23 NOTE — PROVIDER CONTACT NOTE (OTHER) - ACTION/TREATMENT ORDERED:
OK to give AM medications. BP seems to be around pt's baseline, pt is asymptomatic and requesting medication scheduled for now. Staff will continue to assess vital signs as ordered.

## 2018-04-23 NOTE — PROGRESS NOTE ADULT - SUBJECTIVE AND OBJECTIVE BOX
Patient seen and examined at bedside on [address] [place]    Overnight Events: No events overnight    REVIEW OF SYSTEMS:  CONSTITUTIONAL: No weakness, fevers or chills  EYES/ENT: No visual changes;  No dysphagia  NECK: No pain or stiffness  RESPIRATORY: No cough, wheezing, hemoptysis; No shortness of breath  CARDIOVASCULAR: No chest pain or palpitations; No lower extremity edema  GASTROINTESTINAL: No abdominal or epigastric pain. No nausea, vomiting, or hematemesis; No diarrhea or constipation. No melena or hematochezia.  BACK: No back pain  GENITOURINARY: No dysuria, frequency or hematuria  NEUROLOGICAL: No numbness or weakness  SKIN: No itching, burning, rashes, or lesions   All other review of systems is negative unless indicated above.            Current Meds:  acetaminophen   Tablet. 650 milliGRAM(s) Oral every 6 hours PRN  aspirin  chewable 81 milliGRAM(s) Chew daily  atorvastatin 80 milliGRAM(s) Oral at bedtime  clopidogrel Tablet 75 milliGRAM(s) Oral daily  heparin  Infusion.  Unit(s)/Hr IV Continuous <Continuous>  heparin  Injectable 5500 Unit(s) IV Push every 6 hours PRN  heparin  Injectable 2500 Unit(s) IV Push every 6 hours PRN  lisinopril 2.5 milliGRAM(s) Oral daily  metoprolol tartrate 12.5 milliGRAM(s) Oral two times a day  potassium acid phosphate/sodium acid phosphate tablet (K-PHOS No. 2) 1 Tablet(s) Oral three times a day with meals  warfarin 5 milliGRAM(s) Oral once    Vitals:  T(F): 98.1 (04-23), Max: 98.9 (04-22)  HR: 87 (04-23) (87 - 92)  BP: 92/60 (04-23) (91/50 - 104/72)  RR: 18 (04-23)  SpO2: 97% on RA    I&O's Summary - likely not accurate as no output recorded    22 Apr 2018 07:01  -  23 Apr 2018 07:00  --------------------------------------------------------  IN: 842 mL / OUT: 0 mL / NET: 842 mL    23 Apr 2018 07:01  -  23 Apr 2018 10:12  --------------------------------------------------------  IN: 240 mL / OUT: 0 mL / NET: 240 mL    Physical Exam:  Appearance: No acute distress; well appearing, sittign in chair at bedside  Eyes: PERRL, EOMI, pink conjunctiva  HENT: Normal oral mucosa  Cardiovascular: RRR, S1, S2, no murmurs, rubs, or gallops; no edema; no JVD  Respiratory: Clear to auscultation bilaterally  Gastrointestinal: soft, non-tender, non-distended with normal bowel sounds  Musculoskeletal: No clubbing; no joint deformity   Neurologic: Non-focal  Lymphatic: No lymphadenopathy  Psychiatry: AAOx3, mood & affect appropriate  Skin: No rashes, ecchymoses, or cyanosis                          12.2   7.67  )-----------( 236      ( 23 Apr 2018 07:30 )             35.4     04-23    141  |  103  |  19  ----------------------------<  140<H>  3.8   |  24  |  1.16    Ca    7.9<L>      23 Apr 2018 06:25  Phos  2.8     04-23  Mg     2.4     04-23    TPro  6.6  /  Alb  3.0<L>  /  TBili  0.7  /  DBili  x   /  AST  87<H>  /  ALT  89<H>  /  AlkPhos  161<H>  04-23    PT/INR - ( 23 Apr 2018 06:36 )   PT: 20.9 sec;   INR: 1.89 ratio    PTT - ( 23 Apr 2018 06:36 )  PTT:87.4 sec    Echo: < from: TTE with Doppler (w/Cont) (04.19.18 @ 09:07) >  Dimensions:    Normal Values:  LA:     2.4    2.0 - 4.0 cm  Ao:     2.7    2.0 - 3.8 cm  SEPTUM: 1.0    0.6 - 1.2 cm  PWT:    1.0    0.6 - 1.1 cm  LVIDd:  4.2    3.0 - 5.6cm  LVIDs:  3.7    1.8 - 4.0 cm  Derived variables:  LVMI: 77 g/m2  RWT: 0.47  Fractional short: 12 %  EF (Visual Estimate): 20-25 %  Doppler Peak Velocity (m/sec): AoV=1.3  ------------------------------------------------------------------------  Observations: Mitral Valve: Normal mitral valve. Aortic Valve/Aorta: Normal trileaflet aortic valve. Peak transaortic valve gradient equals 7 mm Hg. Peak left ventricular outflow tract gradient equals 1 mm Hg, mean gradient is equal to 3 mm Hg, LVOT velocity time integral equals 16 cm. Aortic Root: 2.7 cm. Left Atrium: Normal left atrium.  LA volume index = 22 cc/m2. Left Ventricle: Severe segmental left ventricular systolic dysfunction.  Akinesis of the mid and apical septum, apex, anterior wall.  Mid to apical inferior and inferolateral wall are severely hypokinetic.   Endocardial visualization enhanced with intravenous injection of echo contrast (Definity). Left ventricular thrombus seen. Normal left ventricular internal dimensions and wall thicknesses. Right Heart: Normal right atrium. Normal right ventricular size and function. Normal tricuspid valve. Normal pulmonic valve. Pericardium/Pleura: Normal pericardium with no pericardial effusion. Hemodynamic: Estimated right atrial pressure is 8 mm Hg. Estimated right ventricular systolic pressure equals 18 mm Hg, assuming right atrial pressure equals 8 mm Hg, consistent with normal pulmonary pressures.  < end of copied text >     Cath: < from: Cardiac Cath Lab - Adult (04.19.18 @ 11:57) > PROCEDURE: --  Intervention on D1: drug-eluting stent. < end of copied text >    < from: Cardiac Cath Lab - Adult (04.18.18 @ 11:19) > PROCEDURE: --  Left heart catheterization with ventriculography. --  Sonosite - Diagnostic. --  IABP Insertion. --  Intervention on proximal LAD: drug-eluting stent.  < end of copied text >    Interpretation of Telemetry: Sinus 90s - 100s, PVCs Patient seen and examined at bedside on 6 [place]    Overnight Events: No events overnight    REVIEW OF SYSTEMS:  CONSTITUTIONAL: No weakness, fevers or chills  EYES/ENT: No visual changes;  No dysphagia  NECK: No pain or stiffness  RESPIRATORY: No cough, wheezing, hemoptysis; No shortness of breath  CARDIOVASCULAR: No chest pain or palpitations; No lower extremity edema  GASTROINTESTINAL: No abdominal or epigastric pain. No nausea, vomiting, or hematemesis; No diarrhea or constipation. No melena or hematochezia.  BACK: No back pain  GENITOURINARY: No dysuria, frequency or hematuria  NEUROLOGICAL: No numbness or weakness  SKIN: No itching, burning, rashes, or lesions   All other review of systems is negative unless indicated above.            Current Meds:  acetaminophen   Tablet. 650 milliGRAM(s) Oral every 6 hours PRN  aspirin  chewable 81 milliGRAM(s) Chew daily  atorvastatin 80 milliGRAM(s) Oral at bedtime  clopidogrel Tablet 75 milliGRAM(s) Oral daily  heparin  Infusion.  Unit(s)/Hr IV Continuous <Continuous>  heparin  Injectable 5500 Unit(s) IV Push every 6 hours PRN  heparin  Injectable 2500 Unit(s) IV Push every 6 hours PRN  lisinopril 2.5 milliGRAM(s) Oral daily  metoprolol tartrate 12.5 milliGRAM(s) Oral two times a day  potassium acid phosphate/sodium acid phosphate tablet (K-PHOS No. 2) 1 Tablet(s) Oral three times a day with meals  warfarin 5 milliGRAM(s) Oral once    Vitals:  T(F): 98.1 (04-23), Max: 98.9 (04-22)  HR: 87 (04-23) (87 - 92)  BP: 92/60 (04-23) (91/50 - 104/72)  RR: 18 (04-23)  SpO2: 97% on RA    Physical Exam:  Appearance: No acute distress; well appearing, sittign in chair at bedside  Eyes: PERRL, EOMI, pink conjunctiva  HENT: Normal oral mucosa  Cardiovascular: RRR, S1, S2, no murmurs, rubs, or gallops; no edema; no JVD  Respiratory: Clear to auscultation bilaterally  Gastrointestinal: soft, non-tender, non-distended with normal bowel sounds  Musculoskeletal: No clubbing; no joint deformity   Neurologic: Non-focal  Lymphatic: No lymphadenopathy  Psychiatry: AAOx3, mood & affect appropriate  Skin: No rashes, ecchymoses, or cyanosis      Interpretation of Telemetry: Sinus 90s - 100s, PVCs    LABS:                      12.2   7.67  )-----------( 236      ( 23 Apr 2018 07:30 )             35.4     04-23    141  |  103  |  19  ----------------------------<  140<H>  3.8   |  24  |  1.16    Ca    7.9<L>      23 Apr 2018 06:25  Phos  2.8     04-23  Mg     2.4     04-23    TPro  6.6  /  Alb  3.0<L>  /  TBili  0.7  /  DBili  x   /  AST  87<H>  /  ALT  89<H>  /  AlkPhos  161<H>  04-23    PT/INR - ( 23 Apr 2018 06:36 )   PT: 20.9 sec;   INR: 1.89 ratio    PTT - ( 23 Apr 2018 06:36 )  PTT:87.4 sec      I&O's Summary - likely not accurate as no output recorded    22 Apr 2018 07:01  -  23 Apr 2018 07:00  --------------------------------------------------------  IN: 842 mL / OUT: 0 mL / NET: 842 mL    23 Apr 2018 07:01  -  23 Apr 2018 10:12  --------------------------------------------------------  IN: 240 mL / OUT: 0 mL / NET: 240 mL      Echo: < from: TTE with Doppler (w/Cont) (04.19.18 @ 09:07) >  Dimensions:    Normal Values:  LA:     2.4    2.0 - 4.0 cm  Ao:     2.7    2.0 - 3.8 cm  SEPTUM: 1.0    0.6 - 1.2 cm  PWT:    1.0    0.6 - 1.1 cm  LVIDd:  4.2    3.0 - 5.6cm  LVIDs:  3.7    1.8 - 4.0 cm  Derived variables:  LVMI: 77 g/m2  RWT: 0.47  Fractional short: 12 %  EF (Visual Estimate): 20-25 %  Doppler Peak Velocity (m/sec): AoV=1.3  ------------------------------------------------------------------------  Observations: Mitral Valve: Normal mitral valve. Aortic Valve/Aorta: Normal trileaflet aortic valve. Peak transaortic valve gradient equals 7 mm Hg. Peak left ventricular outflow tract gradient equals 1 mm Hg, mean gradient is equal to 3 mm Hg, LVOT velocity time integral equals 16 cm. Aortic Root: 2.7 cm. Left Atrium: Normal left atrium.  LA volume index = 22 cc/m2. Left Ventricle: Severe segmental left ventricular systolic dysfunction.  Akinesis of the mid and apical septum, apex, anterior wall.  Mid to apical inferior and inferolateral wall are severely hypokinetic.   Endocardial visualization enhanced with intravenous injection of echo contrast (Definity). Left ventricular thrombus seen. Normal left ventricular internal dimensions and wall thicknesses. Right Heart: Normal right atrium. Normal right ventricular size and function. Normal tricuspid valve. Normal pulmonic valve. Pericardium/Pleura: Normal pericardium with no pericardial effusion. Hemodynamic: Estimated right atrial pressure is 8 mm Hg. Estimated right ventricular systolic pressure equals 18 mm Hg, assuming right atrial pressure equals 8 mm Hg, consistent with normal pulmonary pressures.  < end of copied text >     Cath: < from: Cardiac Cath Lab - Adult (04.19.18 @ 11:57) > PROCEDURE: --  Intervention on D1: drug-eluting stent. < end of copied text >    < from: Cardiac Cath Lab - Adult (04.18.18 @ 11:19) > PROCEDURE: --  Left heart catheterization with ventriculography. --  Sonosite - Diagnostic. --  IABP Insertion. --  Intervention on proximal LAD: drug-eluting stent.  < end of copied text >

## 2018-04-23 NOTE — PROGRESS NOTE ADULT - NSHPATTENDINGPLANDISCUSS_GEN_ALL_CORE
cardiology fellow, Dr. IRAIS Kinney; patient seen and examined.  Hx., exam and labs as above.  I agree with the assessment and recommendations.

## 2018-04-23 NOTE — PROGRESS NOTE ADULT - PROBLEM SELECTOR PLAN 2
- stable O2 sat on RA  - LVEDP 28 and estimated EF 25%  - Continue to monitor I/Os closely, continue to keep net negative  - Cardiology recommended life vest for reduced EF. EP following

## 2018-04-23 NOTE — PROGRESS NOTE ADULT - PROBLEM SELECTOR PLAN 1
- Continue ASA and plavix  - Continue high intensity statin, metoprolol, lisinopril   - Continue to monitor for mechanical complications of late MI  - F/u TSH, lipid, HgA1c

## 2018-04-23 NOTE — PROGRESS NOTE ADULT - SUBJECTIVE AND OBJECTIVE BOX
INTERVAL HPI/OVERNIGHT EVENTS: no events overnight, resting in bed, denies chest pain, sob, dizziness or palpitation.     MEDICATIONS  (STANDING):  aspirin  chewable 81 milliGRAM(s) Chew daily  atorvastatin 80 milliGRAM(s) Oral at bedtime  clopidogrel Tablet 75 milliGRAM(s) Oral daily  heparin  Infusion.  Unit(s)/Hr (12 mL/Hr) IV Continuous <Continuous>  lisinopril 2.5 milliGRAM(s) Oral daily  metoprolol tartrate 12.5 milliGRAM(s) Oral two times a day  potassium acid phosphate/sodium acid phosphate tablet (K-PHOS No. 2) 1 Tablet(s) Oral three times a day with meals  warfarin 5 milliGRAM(s) Oral once    MEDICATIONS  (PRN):  acetaminophen   Tablet. 650 milliGRAM(s) Oral every 6 hours PRN Moderate Pain (4 - 6)  heparin  Injectable 5500 Unit(s) IV Push every 6 hours PRN For aPTT less than 40  heparin  Injectable 2500 Unit(s) IV Push every 6 hours PRN For aPTT between 40 - 57      Allergies  No Known Allergies    ROS:  General: Pt denies fever/chills    Cardiovascular: denies chest pain/palpitations/leg edema    Respiratory and Thorax: denies SOB/cough/wheezing    Gastrointestinal: denies abdominal pain/diarrhea/constipation/bloody stool    Musculoskeletal: denies joint pain or swelling, denies restricted motion    Skin: denies rashes/sores    Hematologic: denies abnormal bleeding    Vital Signs Last 24 Hrs  T(C): 37 (2018 11:18), Max: 37.2 (2018 11:47)  T(F): 98.6 (2018 11:18), Max: 98.9 (2018 11:47)  HR: 83 (2018 11:18) (83 - 92)  BP: 92/62 (2018 11:18) (91/50 - 104/72)  RR: 18 (2018 11:18) (18 - 18)  SpO2: 98% (2018 11:18) (96% - 98%)    Physical Exam:  Constitutional: well developed, well nourished,  and no acute distress    Neurological: Alert & Oriented x 3,  no focal deficits    HEENT:   Neck supple.    Respiratory: CTA B/L, No wheezing/crackles/rhonchi    Cardiovascular: (+) S1 & S2, RRR    Gastrointestinal: soft, NT, nondistended, (+) BS    Genitourinary: non distended bladder, voiding freely    Extremities: No pedal edema, No clubbing, No cyanosis    Skin:  normal skin color and pigmentation, Rt. groin access site intact, Rt radial access site with ecchymosis,;no hematoma       LABS:                        12.2   7.67  )-----------( 236      ( 2018 07:30 )             35.4         141  |  103  |  19  ----------------------------<  140<H>  3.8   |  24  |  1.16    Ca    7.9<L>      2018 06:25  Phos  2.8       Mg     2.4         TPro  6.6  /  Alb  3.0<L>  /  TBili  0.7  /  DBili  x   /  AST  87<H>  /  ALT  89<H>  /  AlkPhos  161<H>      PT/INR - ( 2018 06:36 )   PT: 20.9 sec;   INR: 1.89 ratio         PTT - ( 2018 06:36 )  PTT:87.4 sec  Urinalysis Basic - ( 2018 17:07 )    Color: Yellow / Appearance: Turbid / S.022 / pH: x  Gluc: x / Ketone: Negative  / Bili: Negative / Urobili: Negative mg/dL   Blood: x / Protein: 30 mg/dL / Nitrite: Negative   Leuk Esterase: Negative / RBC: 0 /HPF / WBC 5 /HPF   Sq Epi: x / Non Sq Epi: 4 /HPF / Bacteria: Negative      RADIOLOGY & ADDITIONAL TESTS:  < from: TTE with Doppler (w/Cont) (18 @ 09:07) >  Observations:  Mitral Valve: Normal mitral valve.  Aortic Valve/Aorta: Normal trileaflet aortic valve. Peak  transaortic valve gradient equals 7 mm Hg. Peak left  ventricular outflow tract gradient equals 1 mm Hg, mean  gradient is equal to 3 mm Hg, LVOT velocity time integral  equals 16 cm.  Aortic Root: 2.7 cm.  Left Atrium: Normal left atrium.  LA volume index = 22  cc/m2.  Left Ventricle: Severe segmental left ventricular systolic  dysfunction.  Akinesis of the mid and apical septum, apex,  anterior wall.  Mid to apical inferior and inferolateral  wall are severely hypokinetic.   Endocardial visualization  enhanced with intravenous injection of echo contrast  (Definity). Left ventricular thrombus seen. Normal left  ventricular internal dimensions and wall thicknesses.  Right Heart: Normal right atrium. Normal right ventricular  size and function. Normal tricuspid valve. Normal pulmonic  valve.  Pericardium/Pleura: Normal pericardium with no pericardial  effusion.  Hemodynamic: Estimated right atrial pressure is 8 mm Hg.  Estimated right ventricular systolic pressure equals 18 mm  Hg, assuming right atrial pressure equals 8 mm Hg,  consistent with normal pulmonary pressures.  ------------------------------------------------------------------------  Conclusions:  1. Severe segmental left ventricular systolic dysfunction.  Akinesis of the mid and apical septum, apex, anterior wall.   Mid to apical inferior and inferolateral wall are severely  hypokinetic.   Endocardial visualizationenhanced with  intravenous injection of echo contrast (Definity). Left  ventricular thrombus seen.  2. Normal right ventricular size and function.    < end of copied text >      TELE: SR/ ST at rate 80s-100s, rare  ventricular bigeminy

## 2018-04-23 NOTE — PROGRESS NOTE ADULT - ASSESSMENT
77M w/ no PMHx who was admitted on 4/18 w/ AWSTEMI. On 4/18 he had a KYRA to the pLAD (w/ IABP). On 4/19 he had a staged PCI to D1. He was found to have an LV thrombus w/ LVEF:25% (nl RVSF)    Plan:    1. CAD with AWSTEMI and resulting acute HFrEF (2/2 ICM)  -c/w ASA 81 and Plavix 75 (can likely stop ASA in 1m if cleared by Interventional Cardiologist (Dr. Cole Rees)   -c/w Lipitor 80  -Change Lopressor 12.5 bid to Toprol XL 25  -c/w Lisinopril 2.5  -c/w Heparin gtt - bridge to Coumadin - goal INR 2-3  -LifeVest eval in progress    General Cardiology will follow, the patient does not have an outpatient Cardiologist and can follow up in the Cardiology Clinic as an outpatient [phone].    Nirmal (p): [phone] 77M w/ no PMHx who was admitted on 4/18 w/ AWSTEMI. On 4/18 he had a KYRA to the pLAD (w/ IABP). On 4/19 he had a staged PCI to D1.  She was found to have an LV thrombus w/ LVEF:25% (nl RVSF)    Plan:    1. CAD with AWSTEMI and resulting acute HFrEF (2/2 ICM)  -c/w ASA 81 and Plavix 75 (can likely stop ASA in 1m if cleared by Interventional Cardiologist (Dr. Cole Rees)   -c/w Lipitor 80  -Change Lopressor 12.5 bid to Toprol XL 25  -c/w lisinopril 2.5    2.  LV thombus  -c/w heparin gtt - bridge to Coumadin - goal INR 2-3    3.  Severe LV dysfunction  -LifeVest eval in progress    General Cardiology will follow, the patient does not have an outpatient Cardiologist and can follow up in the Cardiology Clinic as an outpatient [phone].    IRAIS Kinney M.D. (p): [phone]  Cardiology fellow    David Garcia M.D.  Cardiology Consult Service  [phone] or [phone]

## 2018-04-23 NOTE — PROVIDER CONTACT NOTE (OTHER) - ASSESSMENT
Pt is resting comfortably in bed, heparin gtt infusing at 11 ml/hr. Pt A&Ox4, denies of any lightheadedness or pain/distress. Pt asymptomatic.

## 2018-04-23 NOTE — PROVIDER CONTACT NOTE (OTHER) - SITUATION
Pt resting comfortably in bed, morning vitals are as listed: 91/50, 87 HR, 98.1 F, 18 Resp, 97% RA.. Repeat BP after patient fully awake is 92/60.

## 2018-04-23 NOTE — PROGRESS NOTE ADULT - PROBLEM SELECTOR PLAN 7
DVT PPx: heparin gtt to coumadin  Diet: DASH  Dispo: home, cardiac rehab, pending life vest evaluation

## 2018-04-23 NOTE — PROGRESS NOTE ADULT - PROBLEM SELECTOR PLAN 2
LV thrombus  - continue heparin gtt and coumadin   - daily INR : today: 1.89 (INR goal; 2.0-3.0)    # [id number]

## 2018-04-23 NOTE — PROGRESS NOTE ADULT - ASSESSMENT
77 male with no primary medical history a/w late presentation AWSTEMI, s/p KYRA prox LAD 99%, with D1 and ramus 90% course c/b hypotension requiring IABP which is removed on 4/19 and BP now stable, with LV thrombus on heparin to coumadin bridge, pending life vest eval for reduced EF (20-25% in 4/19/2018)         77M, no hx, pw CP, late AW STEMI with 4/16 - KYRA p LAD  , IABP and then Staged PCI D1(90%). Course complicated by LV thrombus, EF 25%.     1. CAD with AWSTEMI and resulting HFrEF wtih EF 25%  - C/w ASA and Plavix   - warfarin for LV thrombus with heparin bridge, INR goal 2-3  - c/w metop 12.5 BID and lisinopril 2.5 daily at current doses given marginal BPs  - c/w atorvastin   - in light of decr EF, ectopy on tele, late presentation, would favor lifevest. EP and silvano rep to see patient. 77 male with no primary medical history a/w late presentation AWSTEMI, s/p KYRA prox LAD 99%, with D1 and ramus 90% course c/b hypotension requiring IABP which is removed on 4/19 and BP now stable, with LV thrombus on heparin to coumadin bridge, pending life vest eval for reduced EF (20-25% in 4/19/2018)

## 2018-04-24 ENCOUNTER — OTHER (OUTPATIENT)
Age: 78
End: 2018-04-24

## 2018-04-24 PROBLEM — Z00.00 ENCOUNTER FOR PREVENTIVE HEALTH EXAMINATION: Status: ACTIVE | Noted: 2018-04-24

## 2018-04-24 LAB
ANION GAP SERPL CALC-SCNC: 11 MMOL/L — SIGNIFICANT CHANGE UP (ref 5–17)
APTT BLD: 73.1 SEC — HIGH (ref 27.5–37.4)
BASOPHILS # BLD AUTO: 0.01 K/UL — SIGNIFICANT CHANGE UP (ref 0–0.2)
BASOPHILS NFR BLD AUTO: 0.1 % — SIGNIFICANT CHANGE UP (ref 0–2)
BUN SERPL-MCNC: 18 MG/DL — SIGNIFICANT CHANGE UP (ref 7–23)
CALCIUM SERPL-MCNC: 8 MG/DL — LOW (ref 8.4–10.5)
CHLORIDE SERPL-SCNC: 102 MMOL/L — SIGNIFICANT CHANGE UP (ref 96–108)
CO2 SERPL-SCNC: 24 MMOL/L — SIGNIFICANT CHANGE UP (ref 22–31)
CREAT SERPL-MCNC: 1.15 MG/DL — SIGNIFICANT CHANGE UP (ref 0.5–1.3)
EOSINOPHIL # BLD AUTO: 0.63 K/UL — HIGH (ref 0–0.5)
EOSINOPHIL NFR BLD AUTO: 8.7 % — HIGH (ref 0–6)
GLUCOSE SERPL-MCNC: 137 MG/DL — HIGH (ref 70–99)
HCT VFR BLD CALC: 34.3 % — LOW (ref 39–50)
HGB BLD-MCNC: 11.2 G/DL — LOW (ref 13–17)
IMM GRANULOCYTES NFR BLD AUTO: 0.4 % — SIGNIFICANT CHANGE UP (ref 0–1.5)
INR BLD: 2.83 RATIO — HIGH (ref 0.88–1.16)
LYMPHOCYTES # BLD AUTO: 0.89 K/UL — LOW (ref 1–3.3)
LYMPHOCYTES # BLD AUTO: 12.3 % — LOW (ref 13–44)
MAGNESIUM SERPL-MCNC: 2.5 MG/DL — SIGNIFICANT CHANGE UP (ref 1.6–2.6)
MCHC RBC-ENTMCNC: 30.8 PG — SIGNIFICANT CHANGE UP (ref 27–34)
MCHC RBC-ENTMCNC: 32.7 GM/DL — SIGNIFICANT CHANGE UP (ref 32–36)
MCV RBC AUTO: 94.2 FL — SIGNIFICANT CHANGE UP (ref 80–100)
MONOCYTES # BLD AUTO: 0.94 K/UL — HIGH (ref 0–0.9)
MONOCYTES NFR BLD AUTO: 13 % — SIGNIFICANT CHANGE UP (ref 2–14)
NEUTROPHILS # BLD AUTO: 4.71 K/UL — SIGNIFICANT CHANGE UP (ref 1.8–7.4)
NEUTROPHILS NFR BLD AUTO: 65.5 % — SIGNIFICANT CHANGE UP (ref 43–77)
PHOSPHATE SERPL-MCNC: 2.9 MG/DL — SIGNIFICANT CHANGE UP (ref 2.5–4.5)
PLATELET # BLD AUTO: 259 K/UL — SIGNIFICANT CHANGE UP (ref 150–400)
POTASSIUM SERPL-MCNC: 3.3 MMOL/L — LOW (ref 3.5–5.3)
POTASSIUM SERPL-SCNC: 3.3 MMOL/L — LOW (ref 3.5–5.3)
PROTHROM AB SERPL-ACNC: 32.7 SEC — HIGH (ref 10–13.1)
RBC # BLD: 3.64 M/UL — LOW (ref 4.2–5.8)
RBC # FLD: 13.6 % — SIGNIFICANT CHANGE UP (ref 10.3–14.5)
SODIUM SERPL-SCNC: 137 MMOL/L — SIGNIFICANT CHANGE UP (ref 135–145)
WBC # BLD: 7.21 K/UL — SIGNIFICANT CHANGE UP (ref 3.8–10.5)
WBC # FLD AUTO: 7.21 K/UL — SIGNIFICANT CHANGE UP (ref 3.8–10.5)

## 2018-04-24 PROCEDURE — 99233 SBSQ HOSP IP/OBS HIGH 50: CPT | Mod: GC

## 2018-04-24 RX ORDER — WARFARIN SODIUM 2.5 MG/1
3 TABLET ORAL ONCE
Qty: 0 | Refills: 0 | Status: COMPLETED | OUTPATIENT
Start: 2018-04-24 | End: 2018-04-24

## 2018-04-24 RX ORDER — METOPROLOL TARTRATE 50 MG
25 TABLET ORAL DAILY
Qty: 0 | Refills: 0 | Status: DISCONTINUED | OUTPATIENT
Start: 2018-04-24 | End: 2018-04-26

## 2018-04-24 RX ORDER — POTASSIUM CHLORIDE 20 MEQ
20 PACKET (EA) ORAL
Qty: 0 | Refills: 0 | Status: COMPLETED | OUTPATIENT
Start: 2018-04-24 | End: 2018-04-24

## 2018-04-24 RX ORDER — WARFARIN SODIUM 2.5 MG/1
5 TABLET ORAL ONCE
Qty: 0 | Refills: 0 | Status: DISCONTINUED | OUTPATIENT
Start: 2018-04-24 | End: 2018-04-24

## 2018-04-24 RX ADMIN — CLOPIDOGREL BISULFATE 75 MILLIGRAM(S): 75 TABLET, FILM COATED ORAL at 13:16

## 2018-04-24 RX ADMIN — Medication 20 MILLIEQUIVALENT(S): at 13:15

## 2018-04-24 RX ADMIN — Medication 12.5 MILLIGRAM(S): at 05:24

## 2018-04-24 RX ADMIN — HEPARIN SODIUM 1100 UNIT(S)/HR: 5000 INJECTION INTRAVENOUS; SUBCUTANEOUS at 09:26

## 2018-04-24 RX ADMIN — Medication 81 MILLIGRAM(S): at 13:16

## 2018-04-24 RX ADMIN — Medication 20 MILLIEQUIVALENT(S): at 09:29

## 2018-04-24 RX ADMIN — Medication 1 TABLET(S): at 20:30

## 2018-04-24 RX ADMIN — LISINOPRIL 2.5 MILLIGRAM(S): 2.5 TABLET ORAL at 05:24

## 2018-04-24 RX ADMIN — Medication 1 TABLET(S): at 09:29

## 2018-04-24 RX ADMIN — WARFARIN SODIUM 3 MILLIGRAM(S): 2.5 TABLET ORAL at 21:28

## 2018-04-24 RX ADMIN — Medication 650 MILLIGRAM(S): at 00:55

## 2018-04-24 RX ADMIN — ATORVASTATIN CALCIUM 80 MILLIGRAM(S): 80 TABLET, FILM COATED ORAL at 21:28

## 2018-04-24 RX ADMIN — Medication 1 TABLET(S): at 13:15

## 2018-04-24 NOTE — DIETITIAN INITIAL EVALUATION ADULT. - NS AS NUTRI INTERV MEDICAL AND FOOD SUPPLEMENTS
Recommend 2 Ensure Enlive to increase po intake (provides additional 700 calories, 40 grams protein). Discussed with provider.

## 2018-04-24 NOTE — PROGRESS NOTE ADULT - PROBLEM SELECTOR PLAN 1
- Continue ASA and plavix  - Continue high intensity statin, transition to metoprolol succinate 25 mf daily, lisinopril   - Continue to monitor for mechanical complications of late MI  - TSH, lipid, HgA1c WNL

## 2018-04-24 NOTE — DIETITIAN INITIAL EVALUATION ADULT. - OTHER INFO
Pt seen for coumadin nutrition education. Pt amenable to nutrition education in house. Pt reports fair appetite with ~50% consumption of his meals. Pt amenable to oral nutrition supplement. Pt denies difficulty chewing/swallowing, N+V, diarrhea, constipation. Last BM 4/23.

## 2018-04-24 NOTE — PROGRESS NOTE ADULT - ASSESSMENT
77 male with no primary medical history a/w late presentation AWSTEMI, s/p KYRA prox LAD 99%, with D1 and ramus 90% course c/b hypotension requiring IABP which is removed on 4/19 and BP now stable, with LV thrombus on heparin to coumadin bridge, EP consulted for life vest evaluation in setting of reduced EF (20-25% in 4/19/2018).

## 2018-04-24 NOTE — DIETITIAN INITIAL EVALUATION ADULT. - ADHERENCE
Pt does not use salt when cooking or at the dinner table. Pt reports following a lacto-ovo vegetarian diet.

## 2018-04-24 NOTE — DIETITIAN INITIAL EVALUATION ADULT. - ORAL INTAKE PTA
Pt reports good appetite PTA. Diet Recall: Breakfast: oatmeal with walnuts, apple juice; Lunch: peanut butter sandwich on whole wheat bread, cranberry juice; Dinner: penne pasta with home made pesto or tomato sauce, water

## 2018-04-24 NOTE — PROGRESS NOTE ADULT - PROBLEM SELECTOR PLAN 1
CAD with AWSTEMI and resulting HFrEF with EF 20-25%  - continue tele  - on Toprol XL 25 mg daily, ASA/ Plavix/ Lipitor   - pt was seen by Javier nichols; given information of life vest/ payment options  - pt to follow up with repeat Echo 3 months after post PCI to reassess EF and visit EP clinic on same day of repeat Echo   - EP will follow CAD with AWSTEMI and resulting HFrEF with EF 20-25%  - continue tele  - on Toprol XL 25 mg daily, ASA/ Plavix/ Lipitor   - pt was seen by Javier nichols; given information of life vest/ payment options. pending decision by Pt at this time.   - pt to follow up with repeat Echo on 7/23 at 10: 15 am and pt to see Dr. Bower at 1:30 pm on same day   - EP will follow

## 2018-04-24 NOTE — PROGRESS NOTE ADULT - PROBLEM SELECTOR PLAN 5
- etiology includes fat emboli w/ recent PCI vs septic arthritis vs diathesis vs prolonged immobilization due to hospitalization  - Physical exam non revealing however will obtain Rt knee xray to evaluate for effusion vs diathesis on multiple AC  - will consider ortho consult if Rt knee xray suggestive of effusion for arthrocentesis. 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

## 2018-04-24 NOTE — PROGRESS NOTE ADULT - PROBLEM SELECTOR PLAN 7
DVT PPx: heparin gtt to coumadin  Diet: DASH  Dispo: home, cardiac rehab, contemplating on life vest as patient has no insurance, risks explained to patient of going home without life vest.    Holly James PGY-1  [facility] [phone]  Pager # [phone]/ [phone]

## 2018-04-24 NOTE — PROGRESS NOTE ADULT - ASSESSMENT
77M w/ no PMHx who was admitted on 4/18 w/ AWSTEMI. On 4/18 he had a KYRA to the pLAD (w/ IABP). On 4/19 he had a staged PCI to D1.  Also found to have an LV thrombus in the setting of LVEF:25% (nl RVSF). The patient is asymptomatic, clinically euvolemic and is on a Heparin gtt - bridging to Coumadin.    Plan:    1. CAD with AWSTEMI and resulting acute HFrEF (2/2 ICM)  -c/w ASA 81 and Plavix 75 (can likely stop ASA in 1m if cleared by Interventional Cardiologist (Dr. Cole Rees))  -c/w Lipitor 80  -Change Lopressor 12.5 bid to Toprol XL 25  -c/w Lisinopril 2.5    2.  LV thombus  -c/w Heparin gtt - bridge to Coumadin - goal INR 2-3  -Would wait for 2 therapeutic INRs prior to stopping Heparin    3.  Severe LV dysfunction  -LifeVest eval in progress / EP following  -Needs outpt EP f/u    General Cardiology will follow, the patient does not have an outpatient Cardiologist and can follow up in the Cardiology Clinic as an outpatient [phone].    IRAIS Kinney MD   Cardiology Fellow  (p): [phone] 77M w/ no PMHx who was admitted on 4/18 w/ AWSTEMI. On 4/18 he had a KYRA to the pLAD (w/ IABP). On 4/19 he had a staged PCI to D1.  Also found to have an LV thrombus in the setting of LVEF:25% (nl RVSF).   The patient is asymptomatic, clinically euvolemic and is on a Heparin gtt - bridging to Coumadin.    REC:    1. CAD with AW-STEMI  -c/w ASA 81 and Plavix 75 (can likely stop ASA in 1m if cleared by Interventional Cardiologist (Dr. Cole Rees))  -c/w Lipitor 80    2.  Acute HFrEF (2/2 ICM)  -Change Lopressor 12.5 bid to Toprol XL 25  -c/w Lisinopril 2.5    3.  LV thombus  -c/w Heparin gtt - bridge to Coumadin - goal INR 2-3  -Would wait for 2 therapeutic INRs prior to stopping heparin    4.  Severe LV dysfunction  -LifeVest eval in progress / EP following  -Needs outpt EP f/u    General Cardiology will follow, the patient does not have an outpatient Cardiologist and can follow up in the Cardiology Clinic as an outpatient [phone].    IRAIS Kinney MD   Cardiology Fellow  (p): [phone]    David Garcia M.D.  Cardiology Consult Service  [phone] or [phone]

## 2018-04-24 NOTE — DIETITIAN INITIAL EVALUATION ADULT. - NS AS NUTRI INTERV ED CONTENT3
Provided pt with coumadin booklet. Discussed drug-nutrient interaction of vitamin K and coumadin, the importance of consuming a consistent amount of vitamin K throughout the day, foods low in vitamin K, foods high in vitamin K. Provided pt with CHF medical nutrition therapy handout. Discussed the importance of sodium restriction, foods low in sodium, foods high in sodium to avoid, tips for eliminating sodium in diet, reading food labels. Reviewed importance of daily weights and weight gain parameters to contact MD. Discussed sodium intake in relation to fluid retention, edema/swelling. Pt verbalized understanding of nutrition education. RD remains available for diet education review and per follow-up protocol. Crys Cam, MS, RDN, CDN Pager # [phone]

## 2018-04-24 NOTE — PROGRESS NOTE ADULT - SUBJECTIVE AND OBJECTIVE BOX
INTERVAL HPI/OVERNIGHT EVENTS: no overnight events, denies chest pain, sob or palpitation.     MEDICATIONS  (STANDING):  aspirin  chewable 81 milliGRAM(s) Chew daily  atorvastatin 80 milliGRAM(s) Oral at bedtime  clopidogrel Tablet 75 milliGRAM(s) Oral daily  heparin  Infusion.  Unit(s)/Hr (12 mL/Hr) IV Continuous <Continuous>  lisinopril 2.5 milliGRAM(s) Oral daily  metoprolol succinate ER 25 milliGRAM(s) Oral daily  potassium acid phosphate/sodium acid phosphate tablet (K-PHOS No. 2) 1 Tablet(s) Oral three times a day with meals  potassium chloride    Tablet ER 20 milliEquivalent(s) Oral every 2 hours  warfarin 5 milliGRAM(s) Oral once    MEDICATIONS  (PRN):  acetaminophen   Tablet. 650 milliGRAM(s) Oral every 6 hours PRN Moderate Pain (4 - 6)  heparin  Injectable 5500 Unit(s) IV Push every 6 hours PRN For aPTT less than 40  heparin  Injectable 2500 Unit(s) IV Push every 6 hours PRN For aPTT between 40 - 57      Allergies    No Known Allergies      ROS:  General: Pt denies fever/chills    Cardiovascular: denies chest pain/palpitations/leg edema    Respiratory and Thorax: denies SOB/cough/wheezing    Gastrointestinal: denies abdominal pain/diarrhea/constipation/bloody stool    Musculoskeletal: denies joint pain or swelling, denies restricted motion    Skin: denies rashes/sores    Hematologic: denies abnormal bleeding    Vital Signs Last 24 Hrs  T(C): 36.8 (24 Apr 2018 04:55), Max: 37.1 (23 Apr 2018 20:57)  T(F): 98.3 (24 Apr 2018 04:55), Max: 98.8 (23 Apr 2018 20:57)  HR: 80 (24 Apr 2018 04:55) (80 - 93)  BP: 98/52 (24 Apr 2018 04:55) (91/56 - 98/52)  RR: 18 (24 Apr 2018 04:55) (18 - 18)  SpO2: 97% (24 Apr 2018 04:55) (96% - 97%)    Physical Exam:  Constitutional: well developed, well nourished,  and no acute distress    Neurological: Alert & Oriented x 3,  no focal deficits    HEENT:   Neck supple.    Respiratory: CTA B/L, No wheezing/crackles/rhonchi    Cardiovascular: (+) S1 & S2, RRR    Gastrointestinal: soft, NT, nondistended, (+) BS    Genitourinary: non distended bladder, voiding freely    Extremities: No pedal edema, No clubbing, No cyanosis    Skin:  normal skin color and pigmentation, no skin lesions      LABS:                        11.2   7.21  )-----------( 259      ( 24 Apr 2018 07:51 )             34.3     04-24    137  |  102  |  18  ----------------------------<  137<H>  3.3<L>   |  24  |  1.15    Ca    8.0<L>      24 Apr 2018 06:47  Phos  2.9     04-24  Mg     2.5     04-24    TPro  6.6  /  Alb  3.0<L>  /  TBili  0.7  /  DBili  x   /  AST  87<H>  /  ALT  89<H>  /  AlkPhos  161<H>  04-23    PT/INR - ( 24 Apr 2018 07:51 )   PT: 32.7 sec;   INR: 2.83 ratio         PTT - ( 24 Apr 2018 07:51 )  PTT:73.1 sec      RADIOLOGY & ADDITIONAL TESTS:     from: TTE with Doppler (w/Cont) (04.19.18 @ 09:07) >  Observations:  Mitral Valve: Normal mitral valve.  Aortic Valve/Aorta: Normal trileaflet aortic valve. Peak  transaortic valve gradient equals 7 mm Hg. Peak left  ventricular outflow tract gradient equals 1 mm Hg, mean  gradient is equal to 3 mm Hg, LVOT velocity time integral  equals 16 cm.  Aortic Root: 2.7 cm.  Left Atrium: Normal left atrium.  LA volume index = 22  cc/m2.  Left Ventricle: Severe segmental left ventricular systolic  dysfunction.  Akinesis of the mid and apical septum, apex,  anterior wall.  Mid to apical inferior and inferolateral  wall are severely hypokinetic.   Endocardial visualization  enhanced with intravenous injection of echo contrast  (Definity). Left ventricular thrombus seen. Normal left  ventricular internal dimensions and wall thicknesses.  Right Heart: Normal right atrium. Normal right ventricular  size and function. Normal tricuspid valve. Normal pulmonic  valve.  Pericardium/Pleura: Normal pericardium with no pericardial  effusion.  Hemodynamic: Estimated right atrial pressure is 8 mm Hg.  Estimated right ventricular systolic pressure equals 18 mm  Hg, assuming right atrial pressure equals 8 mm Hg,  consistent with normal pulmonary pressures.  ------------------------------------------------------------------------  Conclusions:  1. Severe segmental left ventricular systolic dysfunction.  Akinesis of the mid and apical septum, apex, anterior wall.   Mid to apical inferior and inferolateral wall are severely  hypokinetic.   Endocardial visualizationenhanced with  intravenous injection of echo contrast (Definity). Left  ventricular thrombus seen.  2. Normal right ventricular size and function.        TELE: SR/ ST at rate 80-110s, occasional PVCs INTERVAL HPI/OVERNIGHT EVENTS: no overnight events, denies chest pain, sob or palpitation.     MEDICATIONS  (STANDING):  aspirin  chewable 81 milliGRAM(s) Chew daily  atorvastatin 80 milliGRAM(s) Oral at bedtime  clopidogrel Tablet 75 milliGRAM(s) Oral daily  heparin  Infusion.  Unit(s)/Hr (12 mL/Hr) IV Continuous <Continuous>  lisinopril 2.5 milliGRAM(s) Oral daily  metoprolol succinate ER 25 milliGRAM(s) Oral daily  potassium acid phosphate/sodium acid phosphate tablet (K-PHOS No. 2) 1 Tablet(s) Oral three times a day with meals  potassium chloride    Tablet ER 20 milliEquivalent(s) Oral every 2 hours  warfarin 5 milliGRAM(s) Oral once    MEDICATIONS  (PRN):  acetaminophen   Tablet. 650 milliGRAM(s) Oral every 6 hours PRN Moderate Pain (4 - 6)  heparin  Injectable 5500 Unit(s) IV Push every 6 hours PRN For aPTT less than 40  heparin  Injectable 2500 Unit(s) IV Push every 6 hours PRN For aPTT between 40 - 57      Allergies  No Known Allergies      ROS:  General: Pt denies fever/chills    Cardiovascular: denies chest pain/palpitations/leg edema    Respiratory and Thorax: denies SOB/cough/wheezing    Gastrointestinal: denies abdominal pain/diarrhea/constipation/bloody stool    Musculoskeletal: denies joint pain or swelling, denies restricted motion    Skin: denies rashes/sores    Hematologic: denies abnormal bleeding    Vital Signs Last 24 Hrs  T(C): 36.8 (24 Apr 2018 04:55), Max: 37.1 (23 Apr 2018 20:57)  T(F): 98.3 (24 Apr 2018 04:55), Max: 98.8 (23 Apr 2018 20:57)  HR: 80 (24 Apr 2018 04:55) (80 - 93)  BP: 98/52 (24 Apr 2018 04:55) (91/56 - 98/52)  RR: 18 (24 Apr 2018 04:55) (18 - 18)  SpO2: 97% (24 Apr 2018 04:55) (96% - 97%)    Physical Exam:  Constitutional: well developed, well nourished,  and no acute distress    Neurological: Alert & Oriented x 3,  no focal deficits    HEENT:   Neck supple.    Respiratory: CTA B/L, No wheezing/crackles/rhonchi    Cardiovascular: (+) S1 & S2, RRR    Gastrointestinal: soft, NT, nondistended, (+) BS    Genitourinary: non distended bladder, voiding freely    Extremities: No pedal edema, No clubbing, No cyanosis    Skin:  normal skin color and pigmentation, no skin lesions      LABS:                        11.2   7.21  )-----------( 259      ( 24 Apr 2018 07:51 )             34.3     04-24    137  |  102  |  18  ----------------------------<  137<H>  3.3<L>   |  24  |  1.15    Ca    8.0<L>      24 Apr 2018 06:47  Phos  2.9     04-24  Mg     2.5     04-24    TPro  6.6  /  Alb  3.0<L>  /  TBili  0.7  /  DBili  x   /  AST  87<H>  /  ALT  89<H>  /  AlkPhos  161<H>  04-23    PT/INR - ( 24 Apr 2018 07:51 )   PT: 32.7 sec;   INR: 2.83 ratio         PTT - ( 24 Apr 2018 07:51 )  PTT:73.1 sec      RADIOLOGY & ADDITIONAL TESTS:     from: TTE with Doppler (w/Cont) (04.19.18 @ 09:07) >  Observations:  Mitral Valve: Normal mitral valve.  Aortic Valve/Aorta: Normal trileaflet aortic valve. Peak  transaortic valve gradient equals 7 mm Hg. Peak left  ventricular outflow tract gradient equals 1 mm Hg, mean  gradient is equal to 3 mm Hg, LVOT velocity time integral  equals 16 cm.  Aortic Root: 2.7 cm.  Left Atrium: Normal left atrium.  LA volume index = 22  cc/m2.  Left Ventricle: Severe segmental left ventricular systolic  dysfunction.  Akinesis of the mid and apical septum, apex,  anterior wall.  Mid to apical inferior and inferolateral  wall are severely hypokinetic.   Endocardial visualization  enhanced with intravenous injection of echo contrast  (Definity). Left ventricular thrombus seen. Normal left  ventricular internal dimensions and wall thicknesses.  Right Heart: Normal right atrium. Normal right ventricular  size and function. Normal tricuspid valve. Normal pulmonic  valve.  Pericardium/Pleura: Normal pericardium with no pericardial  effusion.  Hemodynamic: Estimated right atrial pressure is 8 mm Hg.  Estimated right ventricular systolic pressure equals 18 mm  Hg, assuming right atrial pressure equals 8 mm Hg,  consistent with normal pulmonary pressures.  ------------------------------------------------------------------------  Conclusions:  1. Severe segmental left ventricular systolic dysfunction.  Akinesis of the mid and apical septum, apex, anterior wall.   Mid to apical inferior and inferolateral wall are severely  hypokinetic.   Endocardial visualizationenhanced with  intravenous injection of echo contrast (Definity). Left  ventricular thrombus seen.  2. Normal right ventricular size and function.        TELE: SR/ ST at rate 80-110s, occasional PVCs

## 2018-04-24 NOTE — DIETITIAN INITIAL EVALUATION ADULT. - ENERGY NEEDS
Fluids per team as pt noted with CHF    ht = 67 inches, wt = 145 pounds, BMI = 22.8kg/m2, IBW = 148 pounds, 98%IBW    Other Pertinent Information: Per chart, this is a 78 Y/O male with no PMH a/w late presentation AWSTEMI S/P KYRA prox LAD 99%, with D1 and ramus 90% cb hypotension requiring IABP which is removed on 4/19 and BP now stable, with LV thrombus on heparin to coumadin bridge, pending life vest eval for reduced EF.

## 2018-04-24 NOTE — PROGRESS NOTE ADULT - SUBJECTIVE AND OBJECTIVE BOX
Patient is a 77y old  Male who presents with a chief complaint of Chest pain/STEMI (22 Apr 2018 10:24)      SUBJECTIVE / OVERNIGHT EVENTS: No events overnight. On telemetry sinus rhythm HR 80s-90s.    MEDICATIONS  (STANDING):  aspirin  chewable 81 milliGRAM(s) Chew daily  atorvastatin 80 milliGRAM(s) Oral at bedtime  clopidogrel Tablet 75 milliGRAM(s) Oral daily  heparin  Infusion.  Unit(s)/Hr (12 mL/Hr) IV Continuous <Continuous>  lisinopril 2.5 milliGRAM(s) Oral daily  metoprolol succinate ER 25 milliGRAM(s) Oral daily  potassium acid phosphate/sodium acid phosphate tablet (K-PHOS No. 2) 1 Tablet(s) Oral three times a day with meals  potassium chloride    Tablet ER 20 milliEquivalent(s) Oral every 2 hours  warfarin 5 milliGRAM(s) Oral once    MEDICATIONS  (PRN):  acetaminophen   Tablet. 650 milliGRAM(s) Oral every 6 hours PRN Moderate Pain (4 - 6)  heparin  Injectable 5500 Unit(s) IV Push every 6 hours PRN For aPTT less than 40  heparin  Injectable 2500 Unit(s) IV Push every 6 hours PRN For aPTT between 40 - 57        CAPILLARY BLOOD GLUCOSE        I&O's Summary    23 Apr 2018 07:01  -  24 Apr 2018 07:00  --------------------------------------------------------  IN: 972 mL / OUT: 300 mL / NET: 672 mL    24 Apr 2018 07:01  -  24 Apr 2018 11:12  --------------------------------------------------------  IN: 245 mL / OUT: 400 mL / NET: -155 mL    Vital Signs Last 24 Hrs  T(C): 36.8 (24 Apr 2018 04:55), Max: 37.1 (23 Apr 2018 20:57)  T(F): 98.3 (24 Apr 2018 04:55), Max: 98.8 (23 Apr 2018 20:57)  HR: 80 (24 Apr 2018 04:55) (80 - 93)  BP: 98/52 (24 Apr 2018 04:55) (91/56 - 98/52)  BP(mean): --  RR: 18 (24 Apr 2018 04:55) (18 - 18)  SpO2: 97% (24 Apr 2018 04:55) (96% - 98%)    PHYSICAL EXAM:  GENERAL: NAD, well-groomed, well-developed  HEAD:  Atraumatic, Normocephalic  EYES: EOMI, PERRLA, conjunctiva and sclera clear  ENMT: No tonsillar erythema, exudates, or enlargement; Moist mucous membranes, Good dentition, No lesions  NECK: Supple, No JVD, Normal thyroid  NERVOUS SYSTEM:  Alert & Oriented X3, Good concentration; Motor Strength 5/5 B/L upper and lower extremities; DTRs 2+ intact and symmetric  CHEST/LUNG: Clear to percussion bilaterally; No rales, rhonchi, wheezing, or rubs  HEART: Regular rate and rhythm; No murmurs, rubs, or gallops  ABDOMEN: Soft, Nontender, Nondistended; Bowel sounds present  EXTREMITIES:  2+ Peripheral Pulses, No clubbing, cyanosis, or edema  LYMPH: No lymphadenopathy noted  SKIN: No rashes or lesions      LABS:                        11.2   7.21  )-----------( 259      ( 24 Apr 2018 07:51 )             34.3     04-24    137  |  102  |  18  ----------------------------<  137<H>  3.3<L>   |  24  |  1.15    Ca    8.0<L>      24 Apr 2018 06:47  Phos  2.9     04-24  Mg     2.5     04-24    TPro  6.6  /  Alb  3.0<L>  /  TBili  0.7  /  DBili  x   /  AST  87<H>  /  ALT  89<H>  /  AlkPhos  161<H>  04-23    PT/INR - ( 24 Apr 2018 07:51 )   PT: 32.7 sec;   INR: 2.83 ratio         PTT - ( 24 Apr 2018 07:51 )  PTT:73.1 sec          RADIOLOGY & ADDITIONAL TESTS:    Imaging Personally Reviewed:    Consultant(s) Notes Reviewed:  Cardiology    Care Discussed with Consultants/Other Providers: Patient is a 77y old  Male who presents with a chief complaint of Chest pain/STEMI (22 Apr 2018 10:24)      SUBJECTIVE / OVERNIGHT EVENTS: No events overnight. On telemetry sinus rhythm HR 80s-90s.     MEDICATIONS  (STANDING):  aspirin  chewable 81 milliGRAM(s) Chew daily  atorvastatin 80 milliGRAM(s) Oral at bedtime  clopidogrel Tablet 75 milliGRAM(s) Oral daily  heparin  Infusion.  Unit(s)/Hr (12 mL/Hr) IV Continuous <Continuous>  lisinopril 2.5 milliGRAM(s) Oral daily  metoprolol succinate ER 25 milliGRAM(s) Oral daily  potassium acid phosphate/sodium acid phosphate tablet (K-PHOS No. 2) 1 Tablet(s) Oral three times a day with meals  potassium chloride    Tablet ER 20 milliEquivalent(s) Oral every 2 hours  warfarin 5 milliGRAM(s) Oral once    MEDICATIONS  (PRN):  acetaminophen   Tablet. 650 milliGRAM(s) Oral every 6 hours PRN Moderate Pain (4 - 6)  heparin  Injectable 5500 Unit(s) IV Push every 6 hours PRN For aPTT less than 40  heparin  Injectable 2500 Unit(s) IV Push every 6 hours PRN For aPTT between 40 - 57        CAPILLARY BLOOD GLUCOSE        I&O's Summary    23 Apr 2018 07:01  -  24 Apr 2018 07:00  --------------------------------------------------------  IN: 972 mL / OUT: 300 mL / NET: 672 mL    24 Apr 2018 07:01  -  24 Apr 2018 11:12  --------------------------------------------------------  IN: 245 mL / OUT: 400 mL / NET: -155 mL    Vital Signs Last 24 Hrs  T(C): 36.8 (24 Apr 2018 04:55), Max: 37.1 (23 Apr 2018 20:57)  T(F): 98.3 (24 Apr 2018 04:55), Max: 98.8 (23 Apr 2018 20:57)  HR: 80 (24 Apr 2018 04:55) (80 - 93)  BP: 98/52 (24 Apr 2018 04:55) (91/56 - 98/52)  BP(mean): --  RR: 18 (24 Apr 2018 04:55) (18 - 18)  SpO2: 97% (24 Apr 2018 04:55) (96% - 98%)    PHYSICAL EXAM:  GENERAL: NAD, well-groomed, well-developed  HEAD:  Atraumatic, Normocephalic  EYES: EOMI, PERRLA, conjunctiva and sclera clear  ENMT: No tonsillar erythema, exudates, or enlargement; Moist mucous membranes, Good dentition, No lesions  NECK: Supple, No JVD, Normal thyroid  NERVOUS SYSTEM:  Alert & Oriented X3, Good concentration; Motor Strength 5/5 B/L upper and lower extremities; DTRs 2+ intact and symmetric  CHEST/LUNG: Clear to percussion bilaterally; No rales, rhonchi, wheezing, or rubs  HEART: Regular rate and rhythm; No murmurs, rubs, or gallops  ABDOMEN: Soft, Nontender, Nondistended; Bowel sounds present  EXTREMITIES:  2+ Peripheral Pulses, No clubbing, cyanosis, or edema  LYMPH: No lymphadenopathy noted  SKIN: No rashes or lesions      LABS:                        11.2   7.21  )-----------( 259      ( 24 Apr 2018 07:51 )             34.3     04-24    137  |  102  |  18  ----------------------------<  137<H>  3.3<L>   |  24  |  1.15    Ca    8.0<L>      24 Apr 2018 06:47  Phos  2.9     04-24  Mg     2.5     04-24    TPro  6.6  /  Alb  3.0<L>  /  TBili  0.7  /  DBili  x   /  AST  87<H>  /  ALT  89<H>  /  AlkPhos  161<H>  04-23    PT/INR - ( 24 Apr 2018 07:51 )   PT: 32.7 sec;   INR: 2.83 ratio         PTT - ( 24 Apr 2018 07:51 )  PTT:73.1 sec          RADIOLOGY & ADDITIONAL TESTS:    Imaging Personally Reviewed:    Consultant(s) Notes Reviewed:  Cardiology    Care Discussed with Consultants/Other Providers:

## 2018-04-24 NOTE — PROGRESS NOTE ADULT - SUBJECTIVE AND OBJECTIVE BOX
Patient seen and examined at bedside on 6 [place]    Overnight Events: No events overnight    REVIEW OF SYSTEMS:  CONSTITUTIONAL: No weakness, fevers or chills. The patient reports very poor sleep and states he is very sleep deprived  EYES/ENT: No visual changes;  No dysphagia  NECK: No pain or stiffness  RESPIRATORY: No cough, wheezing, hemoptysis; No shortness of breath  CARDIOVASCULAR: No chest pain or palpitations; No lower extremity edema  GASTROINTESTINAL: No abdominal or epigastric pain. No nausea, vomiting, or hematemesis; No diarrhea or constipation. No melena or hematochezia.  BACK: No back pain  GENITOURINARY: No dysuria, frequency or hematuria  NEUROLOGICAL: No numbness or weakness  SKIN: No itching, burning, rashes, or lesions   All other review of systems is negative unless indicated above.            Current Meds:  acetaminophen   Tablet. 650 milliGRAM(s) Oral every 6 hours PRN  aspirin  chewable 81 milliGRAM(s) Chew daily  atorvastatin 80 milliGRAM(s) Oral at bedtime  clopidogrel Tablet 75 milliGRAM(s) Oral daily  heparin  Infusion.  Unit(s)/Hr IV Continuous <Continuous>  heparin  Injectable 5500 Unit(s) IV Push every 6 hours PRN  heparin  Injectable 2500 Unit(s) IV Push every 6 hours PRN  lisinopril 2.5 milliGRAM(s) Oral daily  metoprolol tartrate 12.5 milliGRAM(s) Oral two times a day  potassium acid phosphate/sodium acid phosphate tablet (K-PHOS No. 2) 1 Tablet(s) Oral three times a day with meals  Coumadin by level    Vitals:  T(F): 98.3 (04-24), Max: 98.8 (04-23)  HR: 80 (04-24) (80 - 93)  BP: 98/52 (04-24) (91/56 - 98/52)  RR: 18 (04-24)  SpO2: 97% on RA    I&O's Summary    23 Apr 2018 07:01  -  24 Apr 2018 07:00  --------------------------------------------------------  IN: 972 mL / OUT: 300 mL / NET: 672 mL - unclear if accurate?    Physical Exam:  Appearance: No acute distress; well appearing, laying flat, breathing comfortably on RA  Eyes: PERRL, EOMI, pink conjunctiva  HENT: Normal oral mucosa  Cardiovascular: RRR, S1, S2, no murmurs, rubs, or gallops; no edema; no JVD  Respiratory: Clear to auscultation bilaterally  Gastrointestinal: soft, non-tender, non-distended with normal bowel sounds  Musculoskeletal: No clubbing; no joint deformity   Neurologic: Non-focal  Lymphatic: No lymphadenopathy  Psychiatry: AAOx3, mood & affect appropriate  Skin: No rashes, ecchymoses, or cyanosis    AM LABS PENDING    Interpretation of Telemetry: Sinus 80s - 90s, PVCs Patient seen and examined at bedside on 6 [place]    Overnight Events: No events overnight    REVIEW OF SYSTEMS:  CONSTITUTIONAL: No weakness, fevers or chills. The patient reports very poor sleep and states he is very sleep deprived  EYES/ENT: No visual changes;  No dysphagia  NECK: No pain or stiffness  RESPIRATORY: No cough, wheezing, hemoptysis; No shortness of breath  CARDIOVASCULAR: No chest pain or palpitations; No lower extremity edema  GASTROINTESTINAL: No abdominal or epigastric pain. No nausea, vomiting, or hematemesis; No diarrhea or constipation. No melena or hematochezia.  BACK: No back pain  GENITOURINARY: No dysuria, frequency or hematuria  NEUROLOGICAL: No numbness or weakness  SKIN: No itching, burning, rashes, or lesions   All other review of systems is negative unless indicated above.            Current Meds:  acetaminophen   Tablet. 650 milliGRAM(s) Oral every 6 hours PRN  aspirin  chewable 81 milliGRAM(s) Chew daily  atorvastatin 80 milliGRAM(s) Oral at bedtime  clopidogrel Tablet 75 milliGRAM(s) Oral daily  heparin  Infusion.  Unit(s)/Hr IV Continuous <Continuous>  heparin  Injectable 5500 Unit(s) IV Push every 6 hours PRN  heparin  Injectable 2500 Unit(s) IV Push every 6 hours PRN  lisinopril 2.5 milliGRAM(s) Oral daily  metoprolol tartrate 12.5 milliGRAM(s) Oral two times a day  potassium acid phosphate/sodium acid phosphate tablet (K-PHOS No. 2) 1 Tablet(s) Oral three times a day with meals  Coumadin by level    Vitals:  T(F): 98.3 (04-24), Max: 98.8 (04-23)  HR: 80 (04-24) (80 - 93)  BP: 98/52 (04-24) (91/56 - 98/52)  RR: 18 (04-24)  SpO2: 97% on RA    Physical Exam:  Appearance: No acute distress; well appearing, laying flat, breathing comfortably on RA  Eyes: PERRL, EOMI, pink conjunctiva  HENT: Normal oral mucosa  Cardiovascular: RRR, S1, S2, no murmurs, rubs, or gallops; no edema; no JVD  Respiratory: Clear to auscultation bilaterally  Gastrointestinal: soft, non-tender, non-distended with normal bowel sounds  Musculoskeletal: No clubbing; no joint deformity   Neurologic: Non-focal  Lymphatic: No lymphadenopathy  Psychiatry: AAOx3, mood & affect appropriate  Skin: No rashes, ecchymoses, or cyanosis    I&O's Summary    23 Apr 2018 07:01  -  24 Apr 2018 07:00  --------------------------------------------------------  IN: 972 mL / OUT: 300 mL / NET: 672 mL - unclear if accurate?    Interpretation of Telemetry: Sinus 80s - 90s, PVCs    LABS:                        11.2   7.21  )-----------( 259      ( 24 Apr 2018 07:51 )             34.3     PT/INR - ( 24 Apr 2018 07:51 )   PT: 32.7 sec;   INR: 2.83 ratio         PTT - ( 24 Apr 2018 07:51 )  PTT:73.1 sec

## 2018-04-25 DIAGNOSIS — R61 GENERALIZED HYPERHIDROSIS: ICD-10-CM

## 2018-04-25 LAB
ANION GAP SERPL CALC-SCNC: 11 MMOL/L — SIGNIFICANT CHANGE UP (ref 5–17)
APTT BLD: 79.4 SEC — HIGH (ref 27.5–37.4)
BUN SERPL-MCNC: 17 MG/DL — SIGNIFICANT CHANGE UP (ref 7–23)
CALCIUM SERPL-MCNC: 8.3 MG/DL — LOW (ref 8.4–10.5)
CHLORIDE SERPL-SCNC: 102 MMOL/L — SIGNIFICANT CHANGE UP (ref 96–108)
CK MB BLD-MCNC: 2 % — SIGNIFICANT CHANGE UP (ref 0–3.5)
CK MB CFR SERPL CALC: 2.8 NG/ML — SIGNIFICANT CHANGE UP (ref 0–6.7)
CK SERPL-CCNC: 141 U/L — SIGNIFICANT CHANGE UP (ref 30–200)
CO2 SERPL-SCNC: 25 MMOL/L — SIGNIFICANT CHANGE UP (ref 22–31)
CREAT SERPL-MCNC: 1.07 MG/DL — SIGNIFICANT CHANGE UP (ref 0.5–1.3)
GLUCOSE BLDC GLUCOMTR-MCNC: 130 MG/DL — HIGH (ref 70–99)
GLUCOSE BLDC GLUCOMTR-MCNC: 141 MG/DL — HIGH (ref 70–99)
GLUCOSE SERPL-MCNC: 127 MG/DL — HIGH (ref 70–99)
INR BLD: 2.88 RATIO — HIGH (ref 0.88–1.16)
POTASSIUM SERPL-MCNC: 3.9 MMOL/L — SIGNIFICANT CHANGE UP (ref 3.5–5.3)
POTASSIUM SERPL-SCNC: 3.9 MMOL/L — SIGNIFICANT CHANGE UP (ref 3.5–5.3)
PROTHROM AB SERPL-ACNC: 33.3 SEC — HIGH (ref 10–13.1)
SODIUM SERPL-SCNC: 138 MMOL/L — SIGNIFICANT CHANGE UP (ref 135–145)
SRA INTERP SER-IMP: SIGNIFICANT CHANGE UP
TROPONIN T SERPL-MCNC: 4.53 NG/ML — HIGH (ref 0–0.06)

## 2018-04-25 PROCEDURE — 93010 ELECTROCARDIOGRAM REPORT: CPT

## 2018-04-25 PROCEDURE — 99233 SBSQ HOSP IP/OBS HIGH 50: CPT | Mod: GC

## 2018-04-25 PROCEDURE — 99232 SBSQ HOSP IP/OBS MODERATE 35: CPT | Mod: GC

## 2018-04-25 RX ORDER — ONDANSETRON 8 MG/1
4 TABLET, FILM COATED ORAL ONCE
Qty: 0 | Refills: 0 | Status: COMPLETED | OUTPATIENT
Start: 2018-04-25 | End: 2018-04-25

## 2018-04-25 RX ORDER — ASPIRIN/CALCIUM CARB/MAGNESIUM 324 MG
1 TABLET ORAL
Qty: 30 | Refills: 2 | OUTPATIENT
Start: 2018-04-25 | End: 2018-07-23

## 2018-04-25 RX ORDER — WARFARIN SODIUM 2.5 MG/1
1 TABLET ORAL
Qty: 30 | Refills: 0 | OUTPATIENT
Start: 2018-04-25 | End: 2018-05-24

## 2018-04-25 RX ORDER — ATORVASTATIN CALCIUM 80 MG/1
1 TABLET, FILM COATED ORAL
Qty: 30 | Refills: 2 | OUTPATIENT
Start: 2018-04-25 | End: 2018-07-23

## 2018-04-25 RX ORDER — CLOPIDOGREL BISULFATE 75 MG/1
1 TABLET, FILM COATED ORAL
Qty: 30 | Refills: 2 | OUTPATIENT
Start: 2018-04-25 | End: 2018-07-23

## 2018-04-25 RX ORDER — METOPROLOL TARTRATE 50 MG
1 TABLET ORAL
Qty: 30 | Refills: 2 | OUTPATIENT
Start: 2018-04-25 | End: 2018-07-23

## 2018-04-25 RX ORDER — LISINOPRIL 2.5 MG/1
1 TABLET ORAL
Qty: 30 | Refills: 2 | OUTPATIENT
Start: 2018-04-25 | End: 2018-07-23

## 2018-04-25 RX ADMIN — HEPARIN SODIUM 1100 UNIT(S)/HR: 5000 INJECTION INTRAVENOUS; SUBCUTANEOUS at 09:00

## 2018-04-25 RX ADMIN — Medication 1 TABLET(S): at 08:26

## 2018-04-25 RX ADMIN — Medication 1 TABLET(S): at 17:29

## 2018-04-25 RX ADMIN — Medication 25 MILLIGRAM(S): at 05:25

## 2018-04-25 RX ADMIN — Medication 1 TABLET(S): at 13:06

## 2018-04-25 RX ADMIN — CLOPIDOGREL BISULFATE 75 MILLIGRAM(S): 75 TABLET, FILM COATED ORAL at 13:06

## 2018-04-25 RX ADMIN — Medication 81 MILLIGRAM(S): at 13:06

## 2018-04-25 RX ADMIN — ATORVASTATIN CALCIUM 80 MILLIGRAM(S): 80 TABLET, FILM COATED ORAL at 21:34

## 2018-04-25 RX ADMIN — LISINOPRIL 2.5 MILLIGRAM(S): 2.5 TABLET ORAL at 05:25

## 2018-04-25 RX ADMIN — ONDANSETRON 4 MILLIGRAM(S): 8 TABLET, FILM COATED ORAL at 07:09

## 2018-04-25 NOTE — PROGRESS NOTE ADULT - PROBLEM SELECTOR PLAN 1
Clinically appears to be a vasovagal response vs anxiety   - EKG X 2 show improved MONICA  - CE downtrending  - chest pain free  - no intervention necessary currently.

## 2018-04-25 NOTE — PROGRESS NOTE ADULT - SUBJECTIVE AND OBJECTIVE BOX
Patient seen and examined at bedside on 6 [place]    Overnight Events: The patient had an acute episode of nausea and emesis this morning and felt "cool and clammy." He states this occurred after receiving 2 pills - In speaking with the night nurse these medications were Metoprolol and Lisinopril. REpeat ECG unchanged c/w prior, Nuha sent by Medicine.    REVIEW OF SYSTEMS:    CONSTITUTIONAL: No weakness, fevers, + chills, + cold sweat  EYES/ENT: No visual changes;  No dysphagia  NECK: No pain or stiffness  RESPIRATORY: No cough, wheezing, hemoptysis; No shortness of breath  CARDIOVASCULAR: No chest pain or palpitations; No lower extremity edema  GASTROINTESTINAL: No abdominal or epigastric pain. + nausea, + vomiting, no hematemesis; No diarrhea or constipation. No melena or hematochezia.  BACK: No back pain  GENITOURINARY: No dysuria, frequency or hematuria  NEUROLOGICAL: No numbness or weakness  SKIN: No itching, burning, rashes, or lesions   All other review of systems is negative unless indicated above.            Current Meds:  acetaminophen   Tablet. 650 milliGRAM(s) Oral every 6 hours PRN  aspirin  chewable 81 milliGRAM(s) Chew daily  atorvastatin 80 milliGRAM(s) Oral at bedtime  clopidogrel Tablet 75 milliGRAM(s) Oral daily  heparin  Infusion.  Unit(s)/Hr IV Continuous <Continuous>  heparin  Injectable 5500 Unit(s) IV Push every 6 hours PRN  heparin  Injectable 2500 Unit(s) IV Push every 6 hours PRN  lisinopril 2.5 milliGRAM(s) Oral daily  metoprolol succinate ER 25 milliGRAM(s) Oral daily  ondansetron Injectable 4 milliGRAM(s) IV Push once  potassium acid phosphate/sodium acid phosphate tablet (K-PHOS No. 2) 1 Tablet(s) Oral three times a day with meals  Coumadin by level    Vitals:  T(F): 98.2 (04-25), Max: 99.4 (04-24)  HR: 88 (04-25) (88 - 88)  BP: 134/80 (04-25) (89/57 - 134/80) - the SBP of 89 is an outlier. His BP this morning was 134/80  RR: 18 (04-25)  SpO2: 96% on RA    I&O's Summary    23 Apr 2018 07:01 - 24 Apr 2018 07:00  --------------------------------------------------------  IN: 972 mL / OUT: 300 mL / NET: 672 mL    24 Apr 2018 07:01  -  25 Apr 2018 06:54  --------------------------------------------------------  IN: 857 mL / OUT: 850 mL / NET: 7 mL    Physical Exam:  Appearance: Pt more lethargic this morning, gown was we with sweat, pt appeared uncomfortable  Eyes: PERRL, EOMI, pink conjunctiva  HENT: Normal oral mucosa  Cardiovascular: RRR, S1, S2, no murmurs, rubs, or gallops; trace b/l LE edema; no JVD  Respiratory: Clear to auscultation bilaterally  Gastrointestinal: soft, non-tender, non-distended with normal bowel sounds  Musculoskeletal: No clubbing; no joint deformity   Neurologic: Non-focal  Lymphatic: No lymphadenopathy  Psychiatry: AAOx3, mood & affect appropriate  Skin: No rashes, ecchymoses, or cyanosis    04-25    138  |  102  |  17  ----------------------------<  127<H>  3.9   |  25  |  1.07 - Cr stable    Ca    8.3<L>      25 Apr 2018 06:07  Phos  2.9     04-24  Mg     2.5     04-24    PT/INR - ( 24 Apr 2018 07:51 )   PT: 32.7 sec;   INR: 2.83 ratio  - INR was therapeutic on 4/24  PTT - ( 24 Apr 2018 07:51 )  PTT:73.1 sec    CARDIAC MARKERS ( 25 Apr 2018 06:07 )  x     / x     / 141 U/L / x     / x    Trop:4.53 (downtrended from last set)    Interpretation of Telemetry: Sinus 70s - 90s, 4 beats NSVT Patient seen and examined at bedside on 6 [place]    Overnight Events: The patient had an acute episode of nausea and emesis this morning and felt "cool and clammy." He states this occurred after receiving 2 pills - In speaking with the night nurse these medications were Metoprolol and Lisinopril. REpeat ECG unchanged c/w prior, Nuha sent by Medicine.    REVIEW OF SYSTEMS:    CONSTITUTIONAL: No weakness, fevers, + chills, + cold sweat  EYES/ENT: No visual changes;  No dysphagia  NECK: No pain or stiffness  RESPIRATORY: No cough, wheezing, hemoptysis; No shortness of breath  CARDIOVASCULAR: No chest pain or palpitations; No lower extremity edema  GASTROINTESTINAL: No abdominal or epigastric pain. + nausea, + vomiting, no hematemesis; No diarrhea or constipation. No melena or hematochezia.  BACK: No back pain  GENITOURINARY: No dysuria, frequency or hematuria  NEUROLOGICAL: No numbness or weakness  SKIN: No itching, burning, rashes, or lesions   All other review of systems is negative unless indicated above.            Current Meds:  acetaminophen   Tablet. 650 milliGRAM(s) Oral every 6 hours PRN  aspirin  chewable 81 milliGRAM(s) Chew daily  atorvastatin 80 milliGRAM(s) Oral at bedtime  clopidogrel Tablet 75 milliGRAM(s) Oral daily  heparin  Infusion.  Unit(s)/Hr IV Continuous <Continuous>  heparin  Injectable 5500 Unit(s) IV Push every 6 hours PRN  heparin  Injectable 2500 Unit(s) IV Push every 6 hours PRN  lisinopril 2.5 milliGRAM(s) Oral daily  metoprolol succinate ER 25 milliGRAM(s) Oral daily  ondansetron Injectable 4 milliGRAM(s) IV Push once  potassium acid phosphate/sodium acid phosphate tablet (K-PHOS No. 2) 1 Tablet(s) Oral three times a day with meals  Coumadin by level    Vitals:  T(F): 98.2 (04-25), Max: 99.4 (04-24)  HR: 88 (04-25) (88 - 88)  BP: 134/80 (04-25) (89/57 - 134/80) - the SBP of 89 is an outlier. His BP this morning was 134/80  RR: 18 (04-25)  SpO2: 96% on RA    Physical Exam:  Appearance: Pt more lethargic this morning, gown was we with sweat, pt appeared uncomfortable  Eyes: PERRL, EOMI, pink conjunctiva  HENT: Normal oral mucosa  Cardiovascular: RRR, S1, S2, no murmurs, rubs, or gallops; trace b/l LE edema; no JVD  Respiratory: Clear to auscultation bilaterally  Gastrointestinal: soft, non-tender, non-distended with normal bowel sounds  Musculoskeletal: No clubbing; no joint deformity   Neurologic: Non-focal  Lymphatic: No lymphadenopathy  Psychiatry: AAOx3, mood & affect appropriate  Skin: No rashes, ecchymoses, or cyanosis      Interpretation of Telemetry: Sinus 70s - 90s, 4 beats NSVT      LABS:    04-25  138  |  102  |  17  ----------------------------<  127<H>  3.9   |  25  |  1.07 - Cr stable    Ca    8.3<L>      25 Apr 2018 06:07  Phos  2.9     04-24  Mg     2.5     04-24    PT/INR - ( 24 Apr 2018 07:51 )   PT: 32.7 sec;   INR: 2.83 ratio  - INR was therapeutic on 4/24  PTT - ( 24 Apr 2018 07:51 )  PTT:73.1 sec    CARDIAC MARKERS ( 25 Apr 2018 06:07 )  x     / x     / 141 U/L / x     / x    Trop:4.53 (downtrended from last set)    I&O's Summary    23 Apr 2018 07:01  -  24 Apr 2018 07:00  --------------------------------------------------------  IN: 972 mL / OUT: 300 mL / NET: 672 mL    24 Apr 2018 07:01  -  25 Apr 2018 06:54  --------------------------------------------------------  IN: 857 mL / OUT: 850 mL / NET: 7 mL

## 2018-04-25 NOTE — PROGRESS NOTE ADULT - SUBJECTIVE AND OBJECTIVE BOX
Patient is a 77y old  Male who presents with a chief complaint of Chest pain/STEMI (22 Apr 2018 10:24)      SUBJECTIVE / OVERNIGHT EVENTS: Overnight at 2:00 AM patient had 4 beats of WCT, self resolved. At 6:00 AM patient had an episode of diaphoresis, emesis, clammy appearing, " felt like I was sinking." Denied CP, SOB, palpitations, diarrhea, fever, chills. At that time EKG X 2 was grossly unchanged. CE X 1 downtrending from previous levels. Cardiology was informed and had very low suspicion for restent thrombosis. On telemetry at that time, patient was in NSR. Currently denies cool,     MEDICATIONS  (STANDING):  aspirin  chewable 81 milliGRAM(s) Chew daily  atorvastatin 80 milliGRAM(s) Oral at bedtime  clopidogrel Tablet 75 milliGRAM(s) Oral daily  lisinopril 2.5 milliGRAM(s) Oral daily  metoprolol succinate ER 25 milliGRAM(s) Oral daily  potassium acid phosphate/sodium acid phosphate tablet (K-PHOS No. 2) 1 Tablet(s) Oral three times a day with meals    MEDICATIONS  (PRN):  acetaminophen   Tablet. 650 milliGRAM(s) Oral every 6 hours PRN Moderate Pain (4 - 6)        CAPILLARY BLOOD GLUCOSE      POCT Blood Glucose.: 141 mg/dL (25 Apr 2018 08:03)    I&O's Summary    24 Apr 2018 07:01  -  25 Apr 2018 07:00  --------------------------------------------------------  IN: 857 mL / OUT: 850 mL / NET: 7 mL    Vital Signs Last 24 Hrs  T(C): 36.8 (25 Apr 2018 05:45), Max: 37.4 (24 Apr 2018 20:08)  T(F): 98.2 (25 Apr 2018 05:45), Max: 99.4 (24 Apr 2018 20:08)  HR: 88 (25 Apr 2018 05:45) (88 - 88)  BP: 134/80 (25 Apr 2018 05:45) (89/57 - 134/80)  BP(mean): --  RR: 18 (25 Apr 2018 05:45) (17 - 18)  SpO2: 96% (25 Apr 2018 05:45) (96% - 98%)    PHYSICAL EXAM:  GENERAL: NAD, well-groomed, well-developed  HEAD:  Atraumatic, Normocephalic  EYES: EOMI, PERRLA, conjunctiva and sclera clear  ENMT: No tonsillar erythema, exudates, or enlargement; Moist mucous membranes, Good dentition, No lesions  NECK: Supple, No JVD, Normal thyroid  NERVOUS SYSTEM:  Alert & Oriented X3, Good concentration; Motor Strength 5/5 B/L upper and lower extremities; DTRs 2+ intact and symmetric  CHEST/LUNG: Clear to percussion bilaterally; No rales, rhonchi, wheezing, or rubs  HEART: Regular rate and rhythm; No murmurs, rubs, or gallops  ABDOMEN: Soft, Nontender, Nondistended; Bowel sounds present  EXTREMITIES:  2+ Peripheral Pulses, No clubbing, cyanosis, or edema  LYMPH: No lymphadenopathy noted  SKIN: No rashes or lesions    LABS:                        11.2   7.21  )-----------( 259      ( 24 Apr 2018 07:51 )             34.3     04-25    138  |  102  |  17  ----------------------------<  127<H>  3.9   |  25  |  1.07    Ca    8.3<L>      25 Apr 2018 06:07  Phos  2.9     04-24  Mg     2.5     04-24      PT/INR - ( 25 Apr 2018 07:44 )   PT: 33.3 sec;   INR: 2.88 ratio         PTT - ( 25 Apr 2018 07:44 )  PTT:79.4 sec  CARDIAC MARKERS ( 25 Apr 2018 06:07 )  x     / 4.53 ng/mL / 141 U/L / x     / 2.8 ng/mL          RADIOLOGY & ADDITIONAL TESTS:    Imaging Personally Reviewed:    Consultant(s) Notes Reviewed:  Cardiology    Care Discussed with Consultants/Other Providers:

## 2018-04-25 NOTE — PROGRESS NOTE ADULT - PROBLEM SELECTOR PLAN 2
LV thrombus  - continue coumadin   - daily INR : 2.88 today     # [id number] LV thrombus  - continue coumadin   - daily INR : 2.88 today   EP sign off today. please call back as needed     # [id number]

## 2018-04-25 NOTE — PROGRESS NOTE ADULT - PROBLEM SELECTOR PLAN 1
Likely due to anxiety vs MI (very low suspicion)  - EKG X 2 actually shows improvement of MONICA V2-V4 compared to prior

## 2018-04-25 NOTE — PROVIDER CONTACT NOTE (OTHER) - ASSESSMENT
Patient appears dizzy, diaphoretic, is vomiting.   SpO2 95%  88 BPM  134/80 BP  18 RR  98.2 F [place]

## 2018-04-25 NOTE — PROGRESS NOTE ADULT - PROBLEM SELECTOR PLAN 1
CAD with AWSTEMI and resulting HFrEF with EF 20-25%  - continue tele  - on Toprol XL 25 mg daily, ASA/ Plavix/ Lipitor   - pt was seen by Javier nichols; given information of life vest/ payment options. pending decision by Pt at this time.   - pt to follow up with repeat Echo on 7/23 at 10: 15 am and pt to see Dr. Bower at 1:30 pm on same day   - EP will follow CAD with AWSTEMI and resulting HFrEF with EF 20-25%  - continue tele  - on Toprol XL 25 mg daily, ASA/ Plavix/ Lipitor   - pt was seen by Javier nichols; given information of life vest/ payment options. pending decision by Pt at this time.   - pt to follow up with repeat Echo on 7/23 at 10: 15 am and pt to see Dr. Bower at 1:30 pm on same day

## 2018-04-25 NOTE — PROVIDER CONTACT NOTE (OTHER) - ASSESSMENT
Patient denies chest pain, SOB, dizziness, palpitations, diaphoresis  VSS:   98.1 F  17 RR  88 BPM  94/58  98% pulse ox

## 2018-04-25 NOTE — PROGRESS NOTE ADULT - SUBJECTIVE AND OBJECTIVE BOX
INTERVAL HPI/OVERNIGHT EVENTS: an episode of 4 beats of WCT overnight on tele, event of diaphoresis/ SOB this morning (EGK: no changes, down trending trop), Pt resting in bed, c/o fatigue.     MEDICATIONS  (STANDING):  aspirin  chewable 81 milliGRAM(s) Chew daily  atorvastatin 80 milliGRAM(s) Oral at bedtime  clopidogrel Tablet 75 milliGRAM(s) Oral daily  lisinopril 2.5 milliGRAM(s) Oral daily  metoprolol succinate ER 25 milliGRAM(s) Oral daily  potassium acid phosphate/sodium acid phosphate tablet (K-PHOS No. 2) 1 Tablet(s) Oral three times a day with meals    MEDICATIONS  (PRN):  acetaminophen   Tablet. 650 milliGRAM(s) Oral every 6 hours PRN Moderate Pain (4 - 6)      Allergies    No Known Allergies    ROS:  General: Pt denies fever/chills    Cardiovascular: denies chest pain/palpitations/leg edema    Respiratory and Thorax: denies SOB/cough/wheezing    Gastrointestinal: denies abdominal pain/diarrhea/constipation/bloody stool    Musculoskeletal: denies joint pain or swelling, denies restricted motion    Skin: denies rashes/sores    Hematologic: denies abnormal bleeding    Vital Signs Last 24 Hrs  T(C): 36.7 (25 Apr 2018 09:00), Max: 37.4 (24 Apr 2018 20:08)  T(F): 98.1 (25 Apr 2018 09:00), Max: 99.4 (24 Apr 2018 20:08)  HR: 85 (25 Apr 2018 09:00) (85 - 88)  BP: 93/59 (25 Apr 2018 09:00) (89/57 - 134/80)  BP(mean): --  RR: 18 (25 Apr 2018 09:00) (17 - 18)  SpO2: 97% (25 Apr 2018 09:00) (96% - 98%)    Physical Exam:  Constitutional: well developed, well nourished,  and no acute distress    Neurological: Alert & Oriented x 3,  no focal deficits    HEENT:   Neck supple.    Respiratory: CTA B/L, No wheezing/crackles/rhonchi    Cardiovascular: (+) S1 & S2, RRR    Gastrointestinal: soft, NT, nondistended, (+) BS    Genitourinary: non distended bladder, voiding freely    Extremities: No pedal edema, No clubbing, No cyanosis    Skin:  normal skin color and pigmentation, no skin lesions      LABS:                        11.2   7.21  )-----------( 259      ( 24 Apr 2018 07:51 )             34.3     04-25    138  |  102  |  17  ----------------------------<  127<H>  3.9   |  25  |  1.07    Ca    8.3<L>      25 Apr 2018 06:07  Phos  2.9     04-24  Mg     2.5     04-24      PT/INR - ( 25 Apr 2018 07:44 )   PT: 33.3 sec;   INR: 2.88 ratio         PTT - ( 25 Apr 2018 07:44 )  PTT:79.4 sec      RADIOLOGY & ADDITIONAL TESTS:  < from: TTE with Doppler (w/Cont) (04.19.18 @ 09:07) >  Conclusions:  1. Severe segmental left ventricular systolic dysfunction.  Akinesis of the mid and apical septum, apex, anterior wall.   Mid to apical inferior and inferolateral wall are severely  hypokinetic.   Endocardial visualizationenhanced with  intravenous injection of echo contrast (Definity). Left  ventricular thrombus seen.  2. Normal right ventricular size and function.    < end of copied text >    TELE: SR at 80s-90s, 4 beats of WCT episode at night

## 2018-04-25 NOTE — PROVIDER CONTACT NOTE (OTHER) - ACTION/TREATMENT ORDERED:
12 lead ekg x2 to trend evolving MI, Trops drawn,  came at the bedside and head to toe assessed patient

## 2018-04-25 NOTE — PROGRESS NOTE ADULT - ASSESSMENT
77M w/ no PMHx who was admitted on 4/18 w/ AWSTEMI. On 4/18 he had a KYRA to the pLAD (w/ IABP). On 4/19 he had a staged PCI to D1.  Also found to have an LV thrombus in the setting of LVEF:25% (nl RVSF).   The patient is asymptomatic, clinically euvolemic and is on a Heparin gtt - bridging to Coumadin - INR was therapeutic on 4/24/  This morning with acute nausea, emesis, cold sweat w/ out CP, new ECG changes or an increase in cardiac biomarkers - unclear cause.    REC:    1. CAD with AW-STEMI  -c/w ASA 81 and Plavix 75 (can likely stop ASA in 1m if cleared by Interventional Cardiologist (Dr. Cole Rees))  -c/w Lipitor 80  -c/w B-blocker and ACEI    2.  Acute HFrEF (2/2 ICM)  -c/w Toprol 25  -c/w Lisinopril 2.5  -Given severe LV dysfunction EP following, pt deciding on LifeVest, has outpt f/u w/ EP and scheduled repeat TTE    3.  LV thombus  -c/w Heparin gtt - bridge to Coumadin - goal INR 2-3  -D/C Heparin gtt today if INR remains therapeutic    General Cardiology will follow, the patient does not have an outpatient Cardiologist and can follow up in the Cardiology Clinic as an outpatient [phone].    IRAIS Kinney MD   Cardiology Fellow  (p): [phone] 77M w/ no PMHx who was admitted on 4/18 w/ AWSTEMI. On 4/18 he had a KYRA to the pLAD (w/ IABP). On 4/19 he had a staged PCI to D1.  Also found to have an LV thrombus in the setting of LVEF:25% (nl RVSF).   The patient is asymptomatic, clinically euvolemic and is on a Heparin gtt - bridging to Coumadin - INR was therapeutic on 4/24/  This morning with acute nausea, emesis, cold sweat w/ out CP, new ECG changes or an increase in cardiac biomarkers - unclear cause.    REC:    1. CAD with AW-STEMI  -c/w ASA 81 and Plavix 75 (can likely stop ASA in 1m if cleared by Interventional Cardiologist (Dr. Cole Rees))  -c/w Lipitor 80  -c/w B-blocker and ACEI    2.  Acute HFrEF (2/2 ICM)  -c/w Toprol 25  -c/w Lisinopril 2.5  -Given severe LV dysfunction EP following, pt deciding on LifeVest, has outpt f/u w/ EP and scheduled repeat TTE    3.  LV thombus  -c/w Heparin gtt - bridge to Coumadin - goal INR 2-3  -D/C Heparin gtt today if INR remains therapeutic    General Cardiology will follow, the patient does not have an outpatient Cardiologist and can follow up in the Cardiology Clinic as an outpatient [phone].    IRAIS Kinney MD   Cardiology Fellow  (p): [phone]    David Garcia M.D.  Cardiology Consult Service  [phone] or [phone]

## 2018-04-25 NOTE — PROGRESS NOTE ADULT - ASSESSMENT
77M no pmhx a/w late presentation AWSTEMI s/p KRYA prox LAD 99%, with D1 and ramus 90%  cb hypotension requiring IABP which is removed on 4/19 and BP now stable, with LV thrombus on heparin to coumadin bridge, pending life vest eval for reduced EF.

## 2018-04-25 NOTE — PROGRESS NOTE ADULT - PROBLEM SELECTOR PLAN 5
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   - ctm for bleeding and platelet count  - HIT panel negative.

## 2018-04-25 NOTE — PROGRESS NOTE ADULT - SUBJECTIVE AND OBJECTIVE BOX
Patient is a 77y old  Male who presents with a chief complaint of Chest pain/STEMI (22 Apr 2018 10:24)      SUBJECTIVE / OVERNIGHT EVENTS: Overnight became diaphoretic with emesis. No events on tele at that time. Did have 4 beats of WCT 4 hours earlier. No chest pain, palpitations, fever, chills, SOB. Likely a vasovagal response.     MEDICATIONS  (STANDING):  aspirin  chewable 81 milliGRAM(s) Chew daily  atorvastatin 80 milliGRAM(s) Oral at bedtime  clopidogrel Tablet 75 milliGRAM(s) Oral daily  lisinopril 2.5 milliGRAM(s) Oral daily  metoprolol succinate ER 25 milliGRAM(s) Oral daily  potassium acid phosphate/sodium acid phosphate tablet (K-PHOS No. 2) 1 Tablet(s) Oral three times a day with meals    MEDICATIONS  (PRN):  acetaminophen   Tablet. 650 milliGRAM(s) Oral every 6 hours PRN Moderate Pain (4 - 6)        CAPILLARY BLOOD GLUCOSE      POCT Blood Glucose.: 130 mg/dL (25 Apr 2018 12:20)  POCT Blood Glucose.: 141 mg/dL (25 Apr 2018 08:03)    I&O's Summary    24 Apr 2018 07:01  -  25 Apr 2018 07:00  --------------------------------------------------------  IN: 857 mL / OUT: 850 mL / NET: 7 mL    PHYSICAL EXAM:  GENERAL: NAD, well-groomed, well-developed  HEAD:  Atraumatic, Normocephalic  EYES: EOMI, PERRLA, conjunctiva and sclera clear  ENMT: No tonsillar erythema, exudates, or enlargement; Moist mucous membranes, Good dentition, No lesions  NECK: Supple, No JVD, Normal thyroid  NERVOUS SYSTEM:  Alert & Oriented X3, Good concentration; Motor Strength 5/5 B/L upper and lower extremities; DTRs 2+ intact and symmetric  CHEST/LUNG: Clear to percussion bilaterally; No rales, rhonchi, wheezing, or rubs  HEART: Regular rate and rhythm; No murmurs, rubs, or gallops  ABDOMEN: Soft, Nontender, Nondistended; Bowel sounds present  EXTREMITIES:  2+ Peripheral Pulses, No clubbing, cyanosis, or edema  LYMPH: No lymphadenopathy noted  SKIN: No rashes or lesions    LABS:                        11.2   7.21  )-----------( 259      ( 24 Apr 2018 07:51 )             34.3     04-25    138  |  102  |  17  ----------------------------<  127<H>  3.9   |  25  |  1.07    Ca    8.3<L>      25 Apr 2018 06:07  Phos  2.9     04-24  Mg     2.5     04-24      PT/INR - ( 25 Apr 2018 07:44 )   PT: 33.3 sec;   INR: 2.88 ratio         PTT - ( 25 Apr 2018 07:44 )  PTT:79.4 sec  CARDIAC MARKERS ( 25 Apr 2018 06:07 )  x     / 4.53 ng/mL / 141 U/L / x     / 2.8 ng/mL          RADIOLOGY & ADDITIONAL TESTS:    Imaging Personally Reviewed:    Consultant(s) Notes Reviewed:  Cardiology, EP    Care Discussed with Consultants/Other Providers:

## 2018-04-26 VITALS
DIASTOLIC BLOOD PRESSURE: 61 MMHG | SYSTOLIC BLOOD PRESSURE: 95 MMHG | TEMPERATURE: 98 F | RESPIRATION RATE: 18 BRPM | HEART RATE: 86 BPM | OXYGEN SATURATION: 98 %

## 2018-04-26 LAB
APTT BLD: 38.7 SEC — HIGH (ref 27.5–37.4)
CULTURE RESULTS: SIGNIFICANT CHANGE UP
CULTURE RESULTS: SIGNIFICANT CHANGE UP
GLUCOSE BLDC GLUCOMTR-MCNC: 132 MG/DL — HIGH (ref 70–99)
INR BLD: 2.73 RATIO — HIGH (ref 0.88–1.16)
PROTHROM AB SERPL-ACNC: 30.1 SEC — HIGH (ref 9.8–12.7)
SPECIMEN SOURCE: SIGNIFICANT CHANGE UP
SPECIMEN SOURCE: SIGNIFICANT CHANGE UP

## 2018-04-26 PROCEDURE — 76937 US GUIDE VASCULAR ACCESS: CPT

## 2018-04-26 PROCEDURE — 93452 LEFT HRT CATH W/VENTRCLGRPHY: CPT

## 2018-04-26 PROCEDURE — 85610 PROTHROMBIN TIME: CPT

## 2018-04-26 PROCEDURE — 82550 ASSAY OF CK (CPK): CPT

## 2018-04-26 PROCEDURE — 85027 COMPLETE CBC AUTOMATED: CPT

## 2018-04-26 PROCEDURE — 84436 ASSAY OF TOTAL THYROXINE: CPT

## 2018-04-26 PROCEDURE — 84100 ASSAY OF PHOSPHORUS: CPT

## 2018-04-26 PROCEDURE — C1769: CPT

## 2018-04-26 PROCEDURE — 97161 PT EVAL LOW COMPLEX 20 MIN: CPT

## 2018-04-26 PROCEDURE — 82962 GLUCOSE BLOOD TEST: CPT

## 2018-04-26 PROCEDURE — 84484 ASSAY OF TROPONIN QUANT: CPT

## 2018-04-26 PROCEDURE — C1874: CPT

## 2018-04-26 PROCEDURE — 80048 BASIC METABOLIC PNL TOTAL CA: CPT

## 2018-04-26 PROCEDURE — C1725: CPT

## 2018-04-26 PROCEDURE — 85730 THROMBOPLASTIN TIME PARTIAL: CPT

## 2018-04-26 PROCEDURE — 99153 MOD SED SAME PHYS/QHP EA: CPT

## 2018-04-26 PROCEDURE — 80053 COMPREHEN METABOLIC PANEL: CPT

## 2018-04-26 PROCEDURE — C1887: CPT

## 2018-04-26 PROCEDURE — 84443 ASSAY THYROID STIM HORMONE: CPT

## 2018-04-26 PROCEDURE — 87086 URINE CULTURE/COLONY COUNT: CPT

## 2018-04-26 PROCEDURE — 73562 X-RAY EXAM OF KNEE 3: CPT

## 2018-04-26 PROCEDURE — 81001 URINALYSIS AUTO W/SCOPE: CPT

## 2018-04-26 PROCEDURE — 93005 ELECTROCARDIOGRAM TRACING: CPT

## 2018-04-26 PROCEDURE — 99239 HOSP IP/OBS DSCHRG MGMT >30: CPT

## 2018-04-26 PROCEDURE — 83735 ASSAY OF MAGNESIUM: CPT

## 2018-04-26 PROCEDURE — 71045 X-RAY EXAM CHEST 1 VIEW: CPT

## 2018-04-26 PROCEDURE — 86022 PLATELET ANTIBODIES: CPT

## 2018-04-26 PROCEDURE — C1894: CPT

## 2018-04-26 PROCEDURE — 80061 LIPID PANEL: CPT

## 2018-04-26 PROCEDURE — C9606: CPT | Mod: LD

## 2018-04-26 PROCEDURE — C8929: CPT

## 2018-04-26 PROCEDURE — C9600: CPT | Mod: LD

## 2018-04-26 PROCEDURE — 33967 INSERT I-AORT PERCUT DEVICE: CPT

## 2018-04-26 PROCEDURE — C1889: CPT

## 2018-04-26 PROCEDURE — 82553 CREATINE MB FRACTION: CPT

## 2018-04-26 PROCEDURE — 99152 MOD SED SAME PHYS/QHP 5/>YRS: CPT

## 2018-04-26 PROCEDURE — 83036 HEMOGLOBIN GLYCOSYLATED A1C: CPT

## 2018-04-26 PROCEDURE — 87040 BLOOD CULTURE FOR BACTERIA: CPT

## 2018-04-26 RX ADMIN — CLOPIDOGREL BISULFATE 75 MILLIGRAM(S): 75 TABLET, FILM COATED ORAL at 11:19

## 2018-04-26 RX ADMIN — Medication 81 MILLIGRAM(S): at 11:19

## 2018-04-26 RX ADMIN — LISINOPRIL 2.5 MILLIGRAM(S): 2.5 TABLET ORAL at 05:02

## 2018-04-26 RX ADMIN — Medication 25 MILLIGRAM(S): at 05:02

## 2018-04-26 RX ADMIN — Medication 1 TABLET(S): at 08:02

## 2018-04-26 NOTE — PROGRESS NOTE ADULT - PROBLEM SELECTOR PROBLEM 3
Thrombus
Acute decompensated heart failure
Acute decompensated heart failure
Thrombus
Acute decompensated heart failure

## 2018-04-26 NOTE — PROGRESS NOTE ADULT - PROBLEM SELECTOR PLAN 6
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
in s/o ischemic liver injury  - downtrending, ctm
- possibly 2/2 ischemic injury in setting of MI.   - downtrending, ctm
- possibly 2/2 ischemic injury in setting of MI.   - downtrending, ctm
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
in s/o ischemic liver injury  - downtrending, ctm

## 2018-04-26 NOTE — PROGRESS NOTE ADULT - PROBLEM SELECTOR PROBLEM 6
Acute pain of right knee
Liver enzyme elevation
Acute pain of right knee
Acute pain of right knee
Liver enzyme elevation

## 2018-04-26 NOTE — PROGRESS NOTE ADULT - PROBLEM SELECTOR PLAN 8
DVT PPx: heparin gtt to coumadin  Diet: DASH  Dispo: home, cardiac rehab, contemplating on life vest as patient has no insurance, risks explained to patient of going home without life vest.    Holly James PGY-1  [facility] [phone]  Pager # [phone]/ [phone]
DVT PPx: heparin gtt to coumadin  Diet: DASH  Dispo: home, cardiac rehab, contemplating on life vest as patient has no insurance, risks explained to patient of going home without life vest.    Holly James PGY-1  [facility] [phone]  Pager # [phone]/ [phone]
DVT PPx: therapeutic on coumadin  Diet: DASH  Dispo: home today w/ life vest    Holly James PGY-1  [facility] [id number]  Pager # [phone]/ [phone]

## 2018-04-26 NOTE — PROGRESS NOTE ADULT - ATTENDING COMMENTS
Doing well on the floors  Appreciate EP recs  Agree with starting a low dose ACE inhibitor and watching    Thanks    Ruben Sandoval
Patient is seen and examined with fellow, NP and the CCU house-staff. I agree with the history, physical and the assessment and plan.  AWSTEMI s/p PCI to pLAD with staged PCI to D1 today with echo c/w EF 20-25% with LV thrombis  will remove IABP today and restart AC with hep gtt for the LV thrmobus  eventual plan to change from Brilinta to Plavix due to the need for triple therapy
I was physically present for the key portions of the evaluation and management (E/M) service provided.  I agree with the above history, physical, and plan which I have reviewed and edited where appropriate.
I was physically present for the key portions of the evaluation and management (E/M) service provided.  I agree with the above history, physical, and plan which I have reviewed and edited where appropriate. Pt clinically stable now awaiting lifevest as well as completion of heparin bridge to coumadin for LV thrombus. Pt lives in [place] and would like to stay in [facility], but has no insurance so will set him up to be seen post discharge at [facility] on [address].
I was physically present for the key portions of the evaluation and management (E/M) service provided.  I agree with the above history, physical, and plan which I have reviewed and edited where appropriate. Had lengthy discussion with patient about ongoing follow up plans and lifevest. Pt reports acute medical issues, prospects of regular follow ups, medications, and lifevest are all very emotionally overwhelming for him and that he feels the emotional toll associated with lifevest to him might outweigh the benefits. He is gathering information and conferring with a friend about the lifevest and will have a decision made by tomorrow. He will be staying with his girlfriend in [place] and I advised him to consolidate all of his care at one place for now to simplify his lifestyle and mitigate emotional burden- patient will continue medicine and cardiology follow up with [facility].
I was physically present for the key portions of the evaluation and management (E/M) service provided.  I agree with the above history, physical, and plan which I have reviewed and edited where appropriate. Pt agreed to lifevest and has been approved. Per him and his girlfriend at bedside, [facility] representative will fit patient for lifevest at his home today post discharge. Pt counseled on importance of medication compliance, advised on risks/benefits of coumadin including risk of bleeding, dietary/medication interactions and bleeding precautions. Pt understands. Stable for discharge home today.    Total discharge time: 46 minutes.
I was physically present for the key portions of the evaluation and management (E/M) service provided.  I agree with the above history, physical, and plan which I have reviewed and edited where appropriate. Pt clinically stable but with complex social situation in which he does not currently have insurance and needs lifevest prior to discharge. Will have meeting with patient and representative from [facility] today to discuss options for lifevest and plan for dc to home thereafter.
Patient seen and examined.   Patient reports acute Rt knee pain in the setting of fever, follow up cultures, x ray Rt knee. Monitor off antibiotics for now.   LV Thrombus- Continue with Heparin drip bridging with Coumadin.

## 2018-04-26 NOTE — PROGRESS NOTE ADULT - PROBLEM SELECTOR PLAN 3
- stable O2 sat on RA  - LVEDP 28 and estimated EF 25%  - Continue to monitor I/Os closely, continue to keep net negative  - Cardiology recommended life vest for reduced EF. EP following
LV thrombus  - TTE with evidence of LV thrombus, continue heparin. Dosing coumadin  - Continue heparin until therapeutic INR  - incr coumadin 6 mg
- LV thrombus  - TTE with evidence of LV thrombus, continue heparin. Dosing coumadin  - Continue heparin until therapeutic INR  - dose 5 mg today
- LV thrombus  - TTE with evidence of LV thrombus, continue heparin. Dosing coumadin  - Continue heparin until therapeutic INR achieved X 2  - INR therapeutic today 2.83  - dose 5 mg today
- stable O2 sat on RA  - LVEDP 28 and estimated EF 25%  - Continue to monitor I/Os closely, continue to keep net negative  - Cardiology recommended life vest for reduced EF. EP following
- stable O2 sat on RA  - LVEDP 28 and estimated EF 25%  - Continue to monitor I/Os closely, continue to keep net negative  - Cardiology recommended life vest for reduced EF. Life vest at bedside possible discharge today to home.
LV thrombus  - TTE with evidence of LV thrombus, continue heparin. Dosing coumadin  - Continue heparin until therapeutic INR  - incr coumadin 6 mg

## 2018-04-26 NOTE — PROGRESS NOTE ADULT - ASSESSMENT
77M no pmhx a/w late presentation AWSTEMI s/p KYRA prox LAD 99%, with D1 and ramus 90%  cb hypotension requiring IABP which is removed on 4/19 and BP now stable, with LV thrombus on heparin to coumadin bridge, likely discharge today w/ life vest.

## 2018-04-26 NOTE — PROGRESS NOTE ADULT - SUBJECTIVE AND OBJECTIVE BOX
Patient is a 77y old  Male who presents with a chief complaint of Chest pain/STEMI (22 Apr 2018 10:24)      SUBJECTIVE / OVERNIGHT EVENTS: No events overnight. No events on telemetry.    MEDICATIONS  (STANDING):  aspirin  chewable 81 milliGRAM(s) Chew daily  atorvastatin 80 milliGRAM(s) Oral at bedtime  clopidogrel Tablet 75 milliGRAM(s) Oral daily  lisinopril 2.5 milliGRAM(s) Oral daily  metoprolol succinate ER 25 milliGRAM(s) Oral daily  potassium acid phosphate/sodium acid phosphate tablet (K-PHOS No. 2) 1 Tablet(s) Oral three times a day with meals    MEDICATIONS  (PRN):  acetaminophen   Tablet. 650 milliGRAM(s) Oral every 6 hours PRN Moderate Pain (4 - 6)        CAPILLARY BLOOD GLUCOSE      POCT Blood Glucose.: 130 mg/dL (25 Apr 2018 12:20)  POCT Blood Glucose.: 141 mg/dL (25 Apr 2018 08:03)    I&O's Summary    25 Apr 2018 07:01  -  26 Apr 2018 07:00  --------------------------------------------------------  IN: 120 mL / OUT: 1250 mL / NET: -1130 mL      PHYSICAL EXAM:  GENERAL: NAD, well-groomed, well-developed  HEAD:  Atraumatic, Normocephalic  EYES: EOMI, PERRLA, conjunctiva and sclera clear  ENMT: No tonsillar erythema, exudates, or enlargement; Moist mucous membranes, Good dentition, No lesions  NECK: Supple, No JVD, Normal thyroid  NERVOUS SYSTEM:  Alert & Oriented X3, Good concentration; Motor Strength 5/5 B/L upper and lower extremities; DTRs 2+ intact and symmetric  CHEST/LUNG: Clear to percussion bilaterally; No rales, rhonchi, wheezing, or rubs  HEART: Regular rate and rhythm; No murmurs, rubs, or gallops  ABDOMEN: Soft, Nontender, Nondistended; Bowel sounds present  EXTREMITIES:  2+ Peripheral Pulses, No clubbing, cyanosis, or edema  LYMPH: No lymphadenopathy noted  SKIN: No rashes or lesions    LABS:                        11.2   7.21  )-----------( 259      ( 24 Apr 2018 07:51 )             34.3     04-25    138  |  102  |  17  ----------------------------<  127<H>  3.9   |  25  |  1.07    Ca    8.3<L>      25 Apr 2018 06:07      PT/INR - ( 25 Apr 2018 07:44 )   PT: 33.3 sec;   INR: 2.88 ratio         PTT - ( 25 Apr 2018 07:44 )  PTT:79.4 sec  CARDIAC MARKERS ( 25 Apr 2018 06:07 )  x     / 4.53 ng/mL / 141 U/L / x     / 2.8 ng/mL          RADIOLOGY & ADDITIONAL TESTS:    Imaging Personally Reviewed:    Consultant(s) Notes Reviewed:  Cardiology, EP    Care Discussed with Consultants/Other Providers:

## 2018-04-26 NOTE — PROGRESS NOTE ADULT - PROBLEM SELECTOR PROBLEM 7
Liver enzyme elevation
Preventive measure
Liver enzyme elevation
Liver enzyme elevation
Preventive measure

## 2018-04-26 NOTE — PROGRESS NOTE ADULT - PROBLEM SELECTOR PROBLEM 2
Thrombus
Acute decompensated heart failure
STEMI involving left anterior descending coronary artery
STEMI involving left anterior descending coronary artery
Thrombus
Thrombus
STEMI involving left anterior descending coronary artery

## 2018-04-26 NOTE — PROGRESS NOTE ADULT - PROBLEM SELECTOR PROBLEM 1
Ischemic cardiomyopathy
STEMI involving left anterior descending coronary artery
Diaphoresis
Diaphoresis
Ischemic cardiomyopathy
Ischemic cardiomyopathy
STEMI involving left anterior descending coronary artery
Diaphoresis

## 2018-04-26 NOTE — PROGRESS NOTE ADULT - PROBLEM SELECTOR PLAN 4
- LV thrombus  - TTE with evidence of LV thrombus, continue heparin. Dosing coumadin  - Continue heparin until therapeutic INR achieved X 2  - INR therapeutic today 2.83  - dose 5 mg today
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   - ctm for bleeding and platelet count  - pending HIT panel
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   - ctm for bleeding and platelet count  - HIT panel negative.
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   - ctm for bleeding and platelet count  - HIT panel negative.
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   - ctm for bleeding and platelet count  - pending HIT panel
- LV thrombus  - TTE with evidence of LV thrombus, continue heparin. Dosing coumadin  - Continue heparin until therapeutic INR achieved X 2  - INR therapeutic today 2.83  - dose 5 mg today
- LV thrombus  - TTE with evidence of LV thrombus, continue heparin. Dosing coumadin  - Continue heparin until therapeutic INR achieved X 2  - INR therapeutic yesterday 2.83  - c/w coumadin

## 2018-04-26 NOTE — PROGRESS NOTE ADULT - PROBLEM SELECTOR PROBLEM 5
Thrombocytopenia
Acute pain of right knee
Thrombocytopenia
Thrombocytopenia

## 2018-05-02 ENCOUNTER — INPATIENT (INPATIENT)
Facility: HOSPITAL | Age: 78
LOS: 5 days | Discharge: HOME | DRG: 314 | End: 2018-05-08
Attending: STUDENT IN AN ORGANIZED HEALTH CARE EDUCATION/TRAINING PROGRAM | Admitting: HOSPITALIST
Payer: MEDICARE

## 2018-05-02 ENCOUNTER — APPOINTMENT (OUTPATIENT)
Dept: INTERNAL MEDICINE | Facility: CLINIC | Age: 78
End: 2018-05-02

## 2018-05-02 VITALS
TEMPERATURE: 99 F | OXYGEN SATURATION: 98 % | RESPIRATION RATE: 18 BRPM | SYSTOLIC BLOOD PRESSURE: 117 MMHG | DIASTOLIC BLOOD PRESSURE: 85 MMHG | HEART RATE: 110 BPM

## 2018-05-02 DIAGNOSIS — I50.20 UNSPECIFIED SYSTOLIC (CONGESTIVE) HEART FAILURE: ICD-10-CM

## 2018-05-02 DIAGNOSIS — I95.9 HYPOTENSION, UNSPECIFIED: ICD-10-CM

## 2018-05-02 DIAGNOSIS — Z98.890 OTHER SPECIFIED POSTPROCEDURAL STATES: Chronic | ICD-10-CM

## 2018-05-02 DIAGNOSIS — Z29.9 ENCOUNTER FOR PROPHYLACTIC MEASURES, UNSPECIFIED: ICD-10-CM

## 2018-05-02 DIAGNOSIS — R79.1 ABNORMAL COAGULATION PROFILE: ICD-10-CM

## 2018-05-02 DIAGNOSIS — R42 DIZZINESS AND GIDDINESS: ICD-10-CM

## 2018-05-02 DIAGNOSIS — I25.10 ATHEROSCLEROTIC HEART DISEASE OF NATIVE CORONARY ARTERY WITHOUT ANGINA PECTORIS: ICD-10-CM

## 2018-05-02 DIAGNOSIS — D72.829 ELEVATED WHITE BLOOD CELL COUNT, UNSPECIFIED: ICD-10-CM

## 2018-05-02 LAB
ALBUMIN SERPL ELPH-MCNC: 3.5 G/DL — SIGNIFICANT CHANGE UP (ref 3.3–5)
ALP SERPL-CCNC: 116 U/L — SIGNIFICANT CHANGE UP (ref 40–120)
ALT FLD-CCNC: 26 U/L — SIGNIFICANT CHANGE UP (ref 10–45)
ANION GAP SERPL CALC-SCNC: 13 MMOL/L — SIGNIFICANT CHANGE UP (ref 5–17)
APTT BLD: 53.9 SEC — HIGH (ref 27.5–37.4)
AST SERPL-CCNC: 29 U/L — SIGNIFICANT CHANGE UP (ref 10–40)
BASOPHILS # BLD AUTO: 0 K/UL — SIGNIFICANT CHANGE UP (ref 0–0.2)
BASOPHILS NFR BLD AUTO: 0.1 % — SIGNIFICANT CHANGE UP (ref 0–2)
BILIRUB SERPL-MCNC: 0.4 MG/DL — SIGNIFICANT CHANGE UP (ref 0.2–1.2)
BUN SERPL-MCNC: 19 MG/DL — SIGNIFICANT CHANGE UP (ref 7–23)
CALCIUM SERPL-MCNC: 9.2 MG/DL — SIGNIFICANT CHANGE UP (ref 8.4–10.5)
CHLORIDE SERPL-SCNC: 100 MMOL/L — SIGNIFICANT CHANGE UP (ref 96–108)
CK MB CFR SERPL CALC: 2.7 NG/ML — SIGNIFICANT CHANGE UP (ref 0–6.7)
CO2 SERPL-SCNC: 26 MMOL/L — SIGNIFICANT CHANGE UP (ref 22–31)
CREAT SERPL-MCNC: 1.12 MG/DL — SIGNIFICANT CHANGE UP (ref 0.5–1.3)
EOSINOPHIL # BLD AUTO: 0.1 K/UL — SIGNIFICANT CHANGE UP (ref 0–0.5)
EOSINOPHIL NFR BLD AUTO: 0.6 % — SIGNIFICANT CHANGE UP (ref 0–6)
GLUCOSE SERPL-MCNC: 202 MG/DL — HIGH (ref 70–99)
HCT VFR BLD CALC: 42.3 % — SIGNIFICANT CHANGE UP (ref 39–50)
HGB BLD-MCNC: 13.9 G/DL — SIGNIFICANT CHANGE UP (ref 13–17)
INR BLD: 7.29 RATIO — CRITICAL HIGH (ref 0.88–1.16)
LYMPHOCYTES # BLD AUTO: 0.8 K/UL — LOW (ref 1–3.3)
LYMPHOCYTES # BLD AUTO: 5.6 % — LOW (ref 13–44)
MCHC RBC-ENTMCNC: 31.9 PG — SIGNIFICANT CHANGE UP (ref 27–34)
MCHC RBC-ENTMCNC: 32.8 GM/DL — SIGNIFICANT CHANGE UP (ref 32–36)
MCV RBC AUTO: 97.1 FL — SIGNIFICANT CHANGE UP (ref 80–100)
MONOCYTES # BLD AUTO: 0.7 K/UL — SIGNIFICANT CHANGE UP (ref 0–0.9)
MONOCYTES NFR BLD AUTO: 4.6 % — SIGNIFICANT CHANGE UP (ref 2–14)
NEUTROPHILS # BLD AUTO: 12.9 K/UL — HIGH (ref 1.8–7.4)
NEUTROPHILS NFR BLD AUTO: 89.1 % — HIGH (ref 43–77)
PLATELET # BLD AUTO: 606 K/UL — HIGH (ref 150–400)
POTASSIUM SERPL-MCNC: 4.3 MMOL/L — SIGNIFICANT CHANGE UP (ref 3.5–5.3)
POTASSIUM SERPL-SCNC: 4.3 MMOL/L — SIGNIFICANT CHANGE UP (ref 3.5–5.3)
PROT SERPL-MCNC: 7.6 G/DL — SIGNIFICANT CHANGE UP (ref 6–8.3)
PROTHROM AB SERPL-ACNC: 82.7 SEC — HIGH (ref 9.8–12.7)
RBC # BLD: 4.35 M/UL — SIGNIFICANT CHANGE UP (ref 4.2–5.8)
RBC # FLD: 12.1 % — SIGNIFICANT CHANGE UP (ref 10.3–14.5)
SODIUM SERPL-SCNC: 139 MMOL/L — SIGNIFICANT CHANGE UP (ref 135–145)
TROPONIN T SERPL-MCNC: 0.42 NG/ML — HIGH (ref 0–0.06)
WBC # BLD: 14.4 K/UL — HIGH (ref 3.8–10.5)
WBC # FLD AUTO: 14.4 K/UL — HIGH (ref 3.8–10.5)

## 2018-05-02 PROCEDURE — 99285 EMERGENCY DEPT VISIT HI MDM: CPT | Mod: 25

## 2018-05-02 PROCEDURE — 99255 IP/OBS CONSLTJ NEW/EST HI 80: CPT | Mod: GC

## 2018-05-02 PROCEDURE — 93010 ELECTROCARDIOGRAM REPORT: CPT | Mod: 76

## 2018-05-02 PROCEDURE — 93010 ELECTROCARDIOGRAM REPORT: CPT

## 2018-05-02 PROCEDURE — 71045 X-RAY EXAM CHEST 1 VIEW: CPT | Mod: 26

## 2018-05-02 PROCEDURE — 99223 1ST HOSP IP/OBS HIGH 75: CPT | Mod: GC

## 2018-05-02 RX ORDER — LISINOPRIL 2.5 MG/1
2.5 TABLET ORAL DAILY
Qty: 0 | Refills: 0 | Status: DISCONTINUED | OUTPATIENT
Start: 2018-05-02 | End: 2018-05-03

## 2018-05-02 RX ORDER — CLOPIDOGREL BISULFATE 75 MG/1
75 TABLET, FILM COATED ORAL DAILY
Qty: 0 | Refills: 0 | Status: DISCONTINUED | OUTPATIENT
Start: 2018-05-02 | End: 2018-05-08

## 2018-05-02 RX ORDER — SODIUM CHLORIDE 9 MG/ML
3 INJECTION INTRAMUSCULAR; INTRAVENOUS; SUBCUTANEOUS ONCE
Qty: 0 | Refills: 0 | Status: COMPLETED | OUTPATIENT
Start: 2018-05-02 | End: 2018-05-02

## 2018-05-02 RX ORDER — ASPIRIN/CALCIUM CARB/MAGNESIUM 324 MG
81 TABLET ORAL DAILY
Qty: 0 | Refills: 0 | Status: DISCONTINUED | OUTPATIENT
Start: 2018-05-02 | End: 2018-05-08

## 2018-05-02 RX ORDER — ATORVASTATIN CALCIUM 80 MG/1
80 TABLET, FILM COATED ORAL AT BEDTIME
Qty: 0 | Refills: 0 | Status: DISCONTINUED | OUTPATIENT
Start: 2018-05-02 | End: 2018-05-08

## 2018-05-02 RX ORDER — METOPROLOL TARTRATE 50 MG
25 TABLET ORAL DAILY
Qty: 0 | Refills: 0 | Status: DISCONTINUED | OUTPATIENT
Start: 2018-05-02 | End: 2018-05-03

## 2018-05-02 RX ADMIN — SODIUM CHLORIDE 3 MILLILITER(S): 9 INJECTION INTRAMUSCULAR; INTRAVENOUS; SUBCUTANEOUS at 13:45

## 2018-05-02 RX ADMIN — ATORVASTATIN CALCIUM 80 MILLIGRAM(S): 80 TABLET, FILM COATED ORAL at 21:44

## 2018-05-02 NOTE — H&P ADULT - NSHPPHYSICALEXAM_GEN_ALL_CORE
ICU Vital Signs Last 24 Hrs  T(C): 37.1 (02 May 2018 16:08), Max: 37.2 (02 May 2018 12:55)  T(F): 98.8 (02 May 2018 16:08), Max: 98.9 (02 May 2018 12:55)  HR: 102 (02 May 2018 16:08) (102 - 110)  BP: 108/70 (02 May 2018 16:08) (108/70 - 117/85)  RR: 18 (02 May 2018 16:08) (18 - 18)  SpO2: 100% (02 May 2018 16:08) (98% - 100%)    PHYSICAL EXAM:  GENERAL: NAD, well-groomed, well-developed  HEAD:  Atraumatic, Normocephalic  EYES: EOMI, PERRLA, conjunctiva and sclera clear  ENMT: No tonsillar erythema, exudates, or enlargement; Moist mucous membranes, Good dentition, No lesions  NECK: Supple, No JVD, No LAD  CHEST/LUNG: Clear to auscultation bilaterally; No rales, rhonchi, wheezing, or rubs  HEART: Regular rate and rhythm; No murmurs, rubs, or gallops  ABDOMEN: Soft, Nondistended, Nontender, No guarding; Bowel sounds present  VASCULAR:  2+ Peripheral Pulses, No clubbing, cyanosis, or edema  LYMPH: No lymphadenopathy noted  SKIN: No rashes or lesions  NERVOUS SYSTEM:  Alert & Oriented X3, Good concentration; Motor Strength 5/5 B/L upper and lower extremities; DTRs 2+ intact and symmetric ICU Vital Signs Last 24 Hrs  T(C): 37.1 (02 May 2018 16:08), Max: 37.2 (02 May 2018 12:55)  T(F): 98.8 (02 May 2018 16:08), Max: 98.9 (02 May 2018 12:55)  HR: 102 (02 May 2018 16:08) (102 - 110)  BP: 108/70 (02 May 2018 16:08) (108/70 - 117/85)  RR: 18 (02 May 2018 16:08) (18 - 18)  SpO2: 100% (02 May 2018 16:08) (98% - 100%)    PHYSICAL EXAM:  GENERAL: NAD, sitting comfortably in bed, responding to questions appropriately  EYES: EOMI, Mild nystamus, PERRLA, conjunctiva and sclera clear  ENMT: Dry mucous membranes; No tonsillar erythema, exudates, or enlargement  NECK: Supple, No JVD  CHEST/LUNG: Normal work of breathing; Clear to auscultation bilaterally; No rales, rhonchi, wheezing, or rubs  HEART: Tachycardic and regular rhythm; No murmurs, rubs, or gallops  ABDOMEN: Soft, Nondistended, Nontender, No guarding; Bowel sounds present  VASCULAR:  2+ Peripheral Pulses, No clubbing, cyanosis, or edema  SKIN: Warm, well perfused, no rashes or lesions  NERVOUS SYSTEM:  Alert & Oriented X3, Good concentration; Motor Strength 5/5 B/L upper and right lower extremity, and 4/5 left lower extremity ICU Vital Signs Last 24 Hrs  T(C): 37.1 (02 May 2018 16:08), Max: 37.2 (02 May 2018 12:55)  T(F): 98.8 (02 May 2018 16:08), Max: 98.9 (02 May 2018 12:55)  HR: 102 (02 May 2018 16:08) (102 - 110)  BP: 108/70 (02 May 2018 16:08) (108/70 - 117/85)  RR: 18 (02 May 2018 16:08) (18 - 18)  SpO2: 100% (02 May 2018 16:08) (98% - 100%)    PHYSICAL EXAM:  GENERAL: NAD, sitting comfortably in bed, responding to questions appropriately  EYES: EOMI, Mild nystamus, PERRLA, conjunctiva and sclera clear. +horizontal nystagmus when looks from left to right.   ENMT: Dry mucous membranes; No tonsillar erythema, exudates, or enlargement  NECK: Supple, No JVD  CHEST/LUNG: Normal work of breathing; Clear to auscultation bilaterally; No rales, rhonchi, wheezing, or rubs  HEART: Tachycardic and regular rhythm; No murmurs, rubs, or gallops  ABDOMEN: Soft, Nondistended, Nontender, No guarding; Bowel sounds present  VASCULAR:  2+ Peripheral Pulses, No clubbing, cyanosis, or edema  SKIN: Warm, well perfused, no rashes or lesions  NERVOUS SYSTEM:  Alert & Oriented X3, Good concentration; Motor Strength 5/5 B/L upper and right lower extremity, and 4/5 left lower extremity

## 2018-05-02 NOTE — H&P ADULT - NSHPREVIEWOFSYSTEMS_GEN_ALL_CORE
REVIEW OF SYSTEMS:  CONSTITUTIONAL: +fatigue; No fever, weight loss  EYES: No eye pain, visual disturbances, or discharge  ENMT:  No difficulty hearing, tinnitus, vertigo; No sinus or throat pain  NECK: No pain or stiffness  BREASTS: No pain, masses, or nipple discharge  RESPIRATORY: No cough, wheezing, chills or hemoptysis; No shortness of breath  CARDIOVASCULAR: +dizziness, lightheadedness; No chest pain, palpitations or leg swelling  GASTROINTESTINAL: No abdominal or epigastric pain. No nausea, vomiting, or hematemesis; No diarrhea or constipation. No melena or hematochezia.  GENITOURINARY: No dysuria, frequency, hematuria, or incontinence  NEUROLOGICAL: No headaches, memory loss, loss of strength, numbness, or tremors  SKIN: No itching, burning, rashes, or lesions   LYMPH NODES: No enlarged glands  ENDOCRINE: No heat or cold intolerance; No hair loss  MUSCULOSKELETAL: No joint pain or swelling; No muscle, back, or extremity pain  PSYCHIATRIC: No depression, anxiety, mood swings, or difficulty sleeping  HEME/LYMPH: No easy bruising, or bleeding gums  ALLERY AND IMMUNOLOGIC: No hives or eczema

## 2018-05-02 NOTE — H&P ADULT - PROBLEM SELECTOR PLAN 4
Leukocytosis and thrombocytosis, patient may be dehydrated, no s/s of infection at this time, will monitor off abx for now  - if febrile will send cultures and start abx

## 2018-05-02 NOTE — H&P ADULT - NSHPSOCIALHISTORY_GEN_ALL_CORE
Social History:    Marital Status:  (   )    (   ) Single    (   )    (  )   (x ) Significant other  Occupation: actor, retired  Lives with: (  ) alone  (  ) children   ( x ) significant other   (  ) parents  (  ) other    Substance Use (street drugs): ( x ) never used  (  ) other:  Tobacco Usage:  (   ) never smoked   ( x  ) former smoker   (   ) current smoker  (     ) pack year  (        ) last cigarette date  Alcohol Usage: 3-4 glasses of wine weekly

## 2018-05-02 NOTE — ED PROVIDER NOTE - OBJECTIVE STATEMENT
76 y/o male who presents without acute complaint but as a transfer from a clinic due to hypotension and tachycardia as per EMS. The patient had a AMI last week and is now s/p PCI with two stents wearing a life vest. He was at the clinical for regular follow-up and incidentally found to have a BP of 80/? and a pulse in the 1-teens. EMS was called and measured a BP WNLs and a similar pulse. They obtained an EKG which was interpreted by the machine as being c/w STEMI and he was transported s/ incident. He is asymptomatic. He has at times experienced light-headedness since being discharged from the hospital and thinks it may be due to the medications he has been taking since discharge. He was not taking any previously.

## 2018-05-02 NOTE — CONSULT NOTE ADULT - ASSESSMENT
77M w/ no PMHx prior to last admission, who was admitted on 4/18 w/ AWSTEMI. On 4/18 he had a KYRA to the pLAD (w/ IABP). On 4/19 he had a staged PCI to D1.  Also found to have an LV thrombus in the setting of LVEF:25% (nl RVSF). He was D/C'ed on OMT (ACEI, B-blocker) with a LifeVest. He followed up with the General Medicine Clinic today and was sent to the ED for an INR of 7.3 (no bleeding). The patient has sinus tachycardia likely in the setting of his low EF (CO is HR dependent). His BP is consistent with his degree of LV dysfunction. The fact that he is having symptoms of lightheadedness and dizziness on low doses of ACEI and B-blockers is concerning. No clinical signs of end organ hypoperfusion, labs pending.     Plan:    1. CAD with AW-STEMI  -c/w ASA 81 and Plavix 75 (can likely stop ASA in 1m if cleared by Interventional Cardiologist (Dr. Cole Rees))  -c/w Lipitor 80  -c/w B-blocker and ACEI at current doses    2.  Acute HFrEF (2/2 ICM)  -c/w Toprol 25  -c/w Lisinopril 2.5  -c/w LifeVest  -If pt unable to tolerate low dose ACEI and B-blocker may need to involve the HF team    3.  LV thombus  -Hold Coumadin in the setting of supratherapeutic INR  -Goal INR 2-3    General Cardiology will follow, the patient does not have an outpatient Cardiologist and can follow up in the Cardiology Clinic as an outpatient [phone] - he needs to have an appointment scheduled for him.    IRAIS Kinney MD   Cardiology Fellow  (p): [phone] 77M w/ no PMHx prior to last admission, who was admitted on 4/18 w/ AWSTEMI. On 4/18 he had a KYRA to the pLAD (w/ IABP). On 4/19 he had a staged PCI to D1.  Also found to have an LV thrombus in the setting of LVEF:25% (nl RVSF). He was D/C'ed on OMT (ACEI, B-blocker) with a LifeVest. He followed up with the General Medicine Clinic today and was sent to the ED for an INR of 7.3 (no bleeding). The patient has sinus tachycardia likely in the setting of his low EF (CO is HR dependent). His BP is consistent with his degree of LV dysfunction. The fact that he is having symptoms of lightheadedness and dizziness on low doses of ACEI and B-blockers is concerning. No clinical signs of end organ hypoperfusion, labs pending.     Plan:    1. CAD with AW-STEMI  -c/w ASA 81 and Plavix 75 (can likely stop ASA in 1m if cleared by Interventional Cardiologist (Dr. Cole Rees))  -c/w Lipitor 80  -c/w B-blocker and ACEI at current doses    2.  Acute HFrEF (2/2 ICM)  -c/w Toprol 25  -c/w Lisinopril 2.5  -c/w LifeVest  -If pt unable to tolerate low dose ACEI and B-blocker may need to involve the HF team  -Repeat TTE - looking for early aneurysm formation (perform w/ Definity)    3.  LV thombus  -Hold Coumadin in the setting of supratherapeutic INR  -Goal INR 2-3    General Cardiology will follow, the patient does not have an outpatient Cardiologist and can follow up in the Cardiology Clinic as an outpatient [phone] - he needs to have an appointment scheduled for him.    IRAIS Kinney MD   Cardiology Fellow  (p): [phone]

## 2018-05-02 NOTE — CONSULT NOTE ADULT - SUBJECTIVE AND OBJECTIVE BOX
Patient seen and evaluated at bedside in ED Critical D    The patient did not establish care or schedule an appointment with a Cardiologist since discharge on 4/25    Chief Complaint: Sent in from [address] [place] [address] for INR: 7.3 and low blood pressure    HPI: 77M w/ no PMHx prior to last admission, who was recently admitted on 4/18 w/ DMITRY. On 4/18 he had a KYRA to the pLAD (w/ IABP). On 4/19 he had a staged PCI to D1.  Also found to have an LV thrombus in the setting of LVEF:25% (nl RVSF). He was sent home with a LifeVest. The patient was seen at [address] [place] [address] today in the Medicine Clinic. His INR was 7.3 in the Medicine Clinic (no hematemesis, hematuria, melena, blood in stool, hemoptysis). The patient was hypotensive and tachy in the ED per report. He was sent to the ED. The patient reports mild dizziness and lightheadedness but denies CP, SOB, palpitations, LE edema, orthopnea, PND. He reports compliance with his medications.    PMHx:   CAD (s/p PCI)  Chronic HFrEF (2/2 ICM)  LV thrombus    PSHx: History of tonsillectomy    Allergies: No Known Allergies    Home Meds: ASA 81, Lisinopril 2.5, R1carkzr 5mg, Plavix 75, Toprol XL 25, Lipitor 80 - none of his medications have been changed since discharge    FAMILY HISTORY: No pertinent family history in first degree relatives    Social History: Works as an actor  Smoking History: Denied  Alcohol Use: Denied  Drug Use: Denied      REVIEW OF SYSTEMS:  CONSTITUTIONAL: No weakness, fevers or chills  EYES/ENT: No visual changes;  No dysphagia  NECK: No pain or stiffness  RESPIRATORY: No cough, wheezing, hemoptysis; No shortness of breath  CARDIOVASCULAR: No chest pain or palpitations; No lower extremity edema  GASTROINTESTINAL: No abdominal or epigastric pain. No nausea, vomiting, or hematemesis; No diarrhea or constipation. No melena or hematochezia.  BACK: No back pain  GENITOURINARY: No dysuria, frequency or hematuria  NEUROLOGICAL: No numbness or weakness, + dizziness  SKIN: No itching, burning, rashes, or lesions   All other review of systems is negative unless indicated above.    Physical Exam:  T(F): 98.9 (05-02), Max: 98.9 (05-02)  HR: 110 (05-02) (110 - 110) on my exam 112  BP: 117/85 - on my exam 106/78  RR: 18 (05-02)  SpO2: 98% (05-02)    GENERAL: No acute distress, well-developed, laying flat, breathing comfortably on 2L NC  HEAD:  Atraumatic, Normocephalic  ENT: EOMI, PERRLA, conjunctiva and sclera clear, Neck supple, No JVD, moist mucosa  CHEST/LUNG: Clear to auscultation bilaterally; No wheeze, equal breath sounds bilaterally   BACK: No spinal tenderness  HEART: Regular rhythm, low grade tachy; No murmurs, rubs, or gallops  ABDOMEN: Soft, Nontender, Nondistended; Bowel sounds present  EXTREMITIES:  No clubbing, cyanosis, or edema, LE warm to touch  PSYCH: Nl behavior, nl affect  NEUROLOGY: AAOx3, non-focal, cranial nerves intact  SKIN: Normal color, No rashes or lesions  LINES: PIV    Cardiovascular Diagnostic Testing:    ECG: Personally reviewed: Sinus tachy @ 112, nl axis, nl intervals, MONICA in V2, q waves in V1, V2    Cath: Echo: < from: TTE with Doppler (w/Cont) (04.19.18 @ 09:07) >  Dimensions:    Normal Values:  LA:     2.4    2.0 - 4.0 cm  Ao:     2.7    2.0 - 3.8 cm  SEPTUM: 1.0    0.6 - 1.2 cm  PWT:    1.0    0.6 - 1.1 cm  LVIDd:  4.2    3.0 - 5.6cm  LVIDs:  3.7    1.8 - 4.0 cm  Derived variables:  LVMI: 77 g/m2  RWT: 0.47  Fractional short: 12 %  EF (Visual Estimate): 20-25 %  Doppler Peak Velocity (m/sec): AoV=1.3  ------------------------------------------------------------------------  Observations: Mitral Valve: Normal mitral valve. Aortic Valve/Aorta: Normal trileaflet aortic valve. Peak transaortic valve gradient equals 7 mm Hg. Peak left ventricular outflow tract gradient equals 1 mm Hg, mean gradient is equal to 3 mm Hg, LVOT velocity time integral equals 16 cm. Aortic Root: 2.7 cm. Left Atrium: Normal left atrium.  LA volume index = 22 cc/m2. Left Ventricle: Severe segmental left ventricular systolic dysfunction.  Akinesis of the mid and apical septum, apex, anterior wall.  Mid to apical inferior and inferolateral wall are severely hypokinetic.   Endocardial visualization enhanced with intravenous injection of echo contrast (Definity). Left ventricular thrombus seen. Normal left ventricular internal dimensions and wall thicknesses. Right Heart: Normal right atrium. Normal right ventricular size and function. Normal tricuspid valve. Normal pulmonic valve. Pericardium/Pleura: Normal pericardium with no pericardial effusion. Hemodynamic: Estimated right atrial pressure is 8 mm Hg. Estimated right ventricular systolic pressure equals 18 mm Hg, assuming right atrial pressure equals 8 mm Hg, consistent with normal pulmonary pressures.  < end of copied text >     Cath: < from: Cardiac Cath Lab - Adult (04.19.18 @ 11:57) > PROCEDURE: --  Intervention on D1: drug-eluting stent. < end of copied text >    < from: Cardiac Cath Lab - Adult (04.18.18 @ 11:19) > PROCEDURE: --  Left heart catheterization with ventriculography. --  Sonosite - Diagnostic. --  IABP Insertion. --  Intervention on proximal LAD: drug-eluting stent.  < end of copied text >    Imaging: Imaging pending    Labs: Personally reviewed - Remainder of labs pending    PT/INR - ( 02 May 2018 13:22 )   PT: 82.7 sec;   INR: 7.29 ratio    PTT - ( 02 May 2018 13:22 )  PTT:53.9 sec Patient seen and evaluated at bedside in ED Critical D    The patient did not establish care or schedule an appointment with a Cardiologist since discharge on 4/25    Chief Complaint: Sent in from [address] [place] [address] for INR: 7.3 and low blood pressure    HPI: 77M w/ no PMHx prior to last admission, who was recently admitted on 4/18 w/ DMITRY. On 4/18 he had a KYRA to the pLAD (w/ IABP). On 4/19 he had a staged PCI to D1.  Also found to have an LV thrombus in the setting of LVEF:25% (nl RVSF). He was sent home with a LifeVest. The patient was seen at [address] [place] [address] today in the Medicine Clinic. His INR was 7.3 in the Medicine Clinic (no hematemesis, hematuria, melena, blood in stool, hemoptysis). The patient was hypotensive and tachy in the ED per report. He was sent to the ED. The patient reports mild dizziness and lightheadedness but denies CP, SOB, palpitations, LE edema, orthopnea, PND. He reports compliance with his medications.    PMHx:   CAD (s/p PCI)  Chronic HFrEF (2/2 ICM)  LV thrombus    PSHx: History of tonsillectomy    Allergies: No Known Allergies    Home Meds: ASA 81, Lisinopril 2.5, Coumadin 5mg, Plavix 75, Toprol XL 25, Lipitor 80 - none of his medications have been changed since discharge    FAMILY HISTORY: No pertinent family history in first degree relatives    Social History: Works as an actor  Smoking History: Denied  Alcohol Use: Denied  Drug Use: Denied      REVIEW OF SYSTEMS:  CONSTITUTIONAL: No weakness, fevers or chills  EYES/ENT: No visual changes;  No dysphagia  NECK: No pain or stiffness  RESPIRATORY: No cough, wheezing, hemoptysis; No shortness of breath  CARDIOVASCULAR: No chest pain or palpitations; No lower extremity edema  GASTROINTESTINAL: No abdominal or epigastric pain. No nausea, vomiting, or hematemesis; No diarrhea or constipation. No melena or hematochezia.  BACK: No back pain  GENITOURINARY: No dysuria, frequency or hematuria  NEUROLOGICAL: No numbness or weakness, + dizziness  SKIN: No itching, burning, rashes, or lesions   All other review of systems is negative unless indicated above.    Physical Exam:  T(F): 98.9 (05-02), Max: 98.9 (05-02)  HR: 110 (05-02) (110 - 110) on my exam 112  BP: 117/85 - on my exam 106/78  RR: 18 (05-02)  SpO2: 98% (05-02)    GENERAL: No acute distress, well-developed, laying flat, breathing comfortably on 2L NC  HEAD:  Atraumatic, Normocephalic  ENT: EOMI, PERRLA, conjunctiva and sclera clear, Neck supple, No JVD, moist mucosa  CHEST/LUNG: Clear to auscultation bilaterally; No wheeze, equal breath sounds bilaterally   BACK: No spinal tenderness  HEART: Regular rhythm, low grade tachy; No murmurs, rubs, or gallops  ABDOMEN: Soft, Nontender, Nondistended; Bowel sounds present  EXTREMITIES:  No clubbing, cyanosis, or edema, LE warm to touch  PSYCH: Nl behavior, nl affect  NEUROLOGY: AAOx3, non-focal, cranial nerves intact  SKIN: Normal color, No rashes or lesions  LINES: PIV    Cardiovascular Diagnostic Testing:    ECG: Personally reviewed: Sinus tachy @ 112, nl axis, nl intervals, MONICA in V2, q waves in V1, V2    Cath: Echo: < from: TTE with Doppler (w/Cont) (04.19.18 @ 09:07) >  Dimensions:    Normal Values:  LA:     2.4    2.0 - 4.0 cm  Ao:     2.7    2.0 - 3.8 cm  SEPTUM: 1.0    0.6 - 1.2 cm  PWT:    1.0    0.6 - 1.1 cm  LVIDd:  4.2    3.0 - 5.6cm  LVIDs:  3.7    1.8 - 4.0 cm  Derived variables:  LVMI: 77 g/m2  RWT: 0.47  Fractional short: 12 %  EF (Visual Estimate): 20-25 %  Doppler Peak Velocity (m/sec): AoV=1.3  ------------------------------------------------------------------------  Observations: Mitral Valve: Normal mitral valve. Aortic Valve/Aorta: Normal trileaflet aortic valve. Peak transaortic valve gradient equals 7 mm Hg. Peak left ventricular outflow tract gradient equals 1 mm Hg, mean gradient is equal to 3 mm Hg, LVOT velocity time integral equals 16 cm. Aortic Root: 2.7 cm. Left Atrium: Normal left atrium.  LA volume index = 22 cc/m2. Left Ventricle: Severe segmental left ventricular systolic dysfunction.  Akinesis of the mid and apical septum, apex, anterior wall.  Mid to apical inferior and inferolateral wall are severely hypokinetic.   Endocardial visualization enhanced with intravenous injection of echo contrast (Definity). Left ventricular thrombus seen. Normal left ventricular internal dimensions and wall thicknesses. Right Heart: Normal right atrium. Normal right ventricular size and function. Normal tricuspid valve. Normal pulmonic valve. Pericardium/Pleura: Normal pericardium with no pericardial effusion. Hemodynamic: Estimated right atrial pressure is 8 mm Hg. Estimated right ventricular systolic pressure equals 18 mm Hg, assuming right atrial pressure equals 8 mm Hg, consistent with normal pulmonary pressures.  < end of copied text >     Cath: < from: Cardiac Cath Lab - Adult (04.19.18 @ 11:57) > PROCEDURE: --  Intervention on D1: drug-eluting stent. < end of copied text >    < from: Cardiac Cath Lab - Adult (04.18.18 @ 11:19) > PROCEDURE: --  Left heart catheterization with ventriculography. --  Sonosite - Diagnostic. --  IABP Insertion. --  Intervention on proximal LAD: drug-eluting stent.  < end of copied text >    Imaging: Imaging pending    Labs: Personally reviewed - Remainder of labs pending    PT/INR - ( 02 May 2018 13:22 )   PT: 82.7 sec;   INR: 7.29 ratio    PTT - ( 02 May 2018 13:22 )  PTT:53.9 sec

## 2018-05-02 NOTE — H&P ADULT - PROBLEM SELECTOR PLAN 5
Recent AWSTEMI with 2DES to pLAD and D1 on 4/18 and 4/19  - c/w asa, plavix, lipitor  - CE x 1 negative, will send 2nd set for thoroughness, doubt active ischemia as EKG with no new ischemic changes from prior and patient without chest pain/palpitations. Patient complaint with medications.

## 2018-05-02 NOTE — H&P ADULT - PROBLEM SELECTOR PLAN 3
On coumadin 5mg at home for LV thrombus, no s/s of bleeding currently, Hgb increased from prior admission  - hold coumadin for now  - goal INR 2-3  - fall precautions

## 2018-05-02 NOTE — H&P ADULT - ATTENDING COMMENTS
Agree with above with following addendum:    1. Dizziness  -patietn endorses dizziness with moving head from left to right, when standing as well.  -does not sound vertiginous in nature.  No vertical nystagmus.   -concern there is a low flow state 2/2 anterior wall STEMI and new heart failure  -agree with repeat TTE  -would check orthostatics.  -continue current regimen at this time as per cards.  Will perform POCUS if US available tomorrow to assess IVC as well to see if patietn fluid down or low flow state 2/2 poor LVEF.  -if unable to tolerate ACE-i or beta blocker 2/2 low flow state, would then need an LVAD evaluation.

## 2018-05-02 NOTE — ED PROVIDER NOTE - PRIOR EKG STATUS
STEs in same leads on prior EKGs. More pronounced today. No dynamic change from the EKG EMS obtained.

## 2018-05-02 NOTE — ED PROVIDER NOTE - MEDICAL DECISION MAKING DETAILS
Recent AMI and PCI with stents. Asymptomatic here. EKG does show STEs but in the same distribution as his prior EKGs and is s/ dynamic change from the pre-hospital EKG. I do not believe he warrants STEMI activation but given his recent PCI, will inform Cardiology of his presentation. Full investigation initiated. Will follow his results and treat/dispo accordingly.

## 2018-05-02 NOTE — H&P ADULT - PROBLEM SELECTOR PLAN 1
Patient reporting dizziness with head spinning since day of discharge (4/26), significantly affect ADLs, likely secondary to sinus tachycardia vs hypotension (orthostasis vs dehydration vs intolerance to ACE-I)  - will check orthostatic vitals  - consider small fluid bolus if orthostatic  - monitor overnight on home heart failure regimen

## 2018-05-02 NOTE — ED ADULT NURSE NOTE - OBJECTIVE STATEMENT
78 y/o male hx CAD, cardiac cath with stent placement, 76 y/o male hx CAD, cardiac cath with stent placement dc with life vest in place presents to the ED c/o abnormal EKG changes, hypotensive. As per EMS, MD at clinic states pt was in 80/54 BP, EKG showed STEMI. Pt arrived to ED denies any symptoms, CP, SOB, diff breathing, diaphoresis, n/v. Pt endorsing intermittent episodes of dizziness since dc. Pt A&Ox3, in no respiratory distress, no SOB, lungs CTA, cap refill<2, pulses present, resting comfortably with safety maintained, EKG completed, CM applied NSR, Dr. Thompson at bedside evaluating pt.

## 2018-05-02 NOTE — ED PROVIDER NOTE - CARE PLAN
Principal Discharge DX:	Hypotension  Secondary Diagnosis:	Supratherapeutic INR  Secondary Diagnosis:	Leukocytosis

## 2018-05-02 NOTE — H&P ADULT - NSHPLABSRESULTS_GEN_ALL_CORE
05-02    139  |  100  |  19  ----------------------------<  202<H>  4.3   |  26  |  1.12    Ca    9.2      02 May 2018 13:22    TPro  7.6  /  Alb  3.5  /  TBili  0.4  /  DBili  x   /  AST  29  /  ALT  26  /  AlkPhos  116  05-02      PT/INR - ( 02 May 2018 13:22 )   PT: 82.7 sec;   INR: 7.29 ratio         PTT - ( 02 May 2018 13:22 )  PTT:53.9 sec                                        13.9   14.4  )-----------( 606      ( 02 May 2018 13:22 )             42.3     CAPILLARY BLOOD GLUCOSE      EKG: Sinus tachycardia, MONICA V1-V5 (old from prior), no acute ischemic changes

## 2018-05-02 NOTE — H&P ADULT - HISTORY OF PRESENT ILLNESS
78yo M PMH recently diagnosed CAD s/p 2DES (pLAD, D1) on 4/18 and 4/19 c/b LV thrombus now on coumadin, HFpEF (EF 20%) who was sent home with life vest now presenting from INR clinic with elevated INR 7.3, hypotension (SBP 80s) and tachycardia (110s). 76yo M PMH recently diagnosed CAD s/p 2DES (pLAD, D1) on 4/18 and 4/19 c/b LV thrombus now on coumadin, HFpEF (EF 20%) who was sent home with life vest now presenting from INR clinic with elevated INR 7.3, hypotension (SBP 80s) and tachycardia (110s). Patient reporting dizziness and lightheadedness since discharge from hospital on 4/26. He reports staying in bed most of the day since because his life vest was too large and would alarm every time he got up out of bed. Patient denies any chest pain, palpitations, SOB, PND, orthopnea, nausea, vomiting, diarrhea, constipation, melena, hematochezia, hematuria, flank pain, epistaxis, hemoptysis, falls.     In ED, Afebrile, HR 110s, -110/70-80, RR 18, 98% RA. EKG performed in ED (cannot be found in paper chart but per ED note, was interpreted as sinus tachycardia , MONICA V1-V5 (more pronounced than prior). Labs significant for leukocytosis WBC 14.4 and INR 7.29. CXR with clear lungs. Cardiology consulted.

## 2018-05-02 NOTE — H&P ADULT - ASSESSMENT
76yo M PMH recently diagnosed CAD s/p 2DES (pLAD, D1) on 4/18 and 4/19 c/b LV thrombus now on coumadin, HFpEF (EF 20%) who was sent home with life vest now presenting from INR clinic with supratherapeutic INR and sinus tachycardia with dizziness.

## 2018-05-02 NOTE — H&P ADULT - PROBLEM SELECTOR PLAN 2
TTE 4/18: EF 20% with LV thrombus  - c/w home toprol, lisinopril  - hold coumadin for supra therapeutic INR  - will place pacer pads in event patient goes into VT/VF  - tele monitoring

## 2018-05-02 NOTE — ED ADULT NURSE NOTE - CHPI ED SYMPTOMS NEG
no cough/no dizziness/no shortness of breath/no diaphoresis/no chest pain/no syncope/no chills/no back pain/no vomiting/no fever/no nausea

## 2018-05-03 LAB
ANION GAP SERPL CALC-SCNC: 12 MMOL/L — SIGNIFICANT CHANGE UP (ref 5–17)
APTT BLD: 57.5 SEC — HIGH (ref 27.5–37.4)
BASOPHILS # BLD AUTO: 0.01 K/UL — SIGNIFICANT CHANGE UP (ref 0–0.2)
BASOPHILS NFR BLD AUTO: 0.1 % — SIGNIFICANT CHANGE UP (ref 0–2)
BUN SERPL-MCNC: 17 MG/DL — SIGNIFICANT CHANGE UP (ref 7–23)
CALCIUM SERPL-MCNC: 8.5 MG/DL — SIGNIFICANT CHANGE UP (ref 8.4–10.5)
CHLORIDE SERPL-SCNC: 100 MMOL/L — SIGNIFICANT CHANGE UP (ref 96–108)
CO2 SERPL-SCNC: 24 MMOL/L — SIGNIFICANT CHANGE UP (ref 22–31)
CREAT SERPL-MCNC: 1.03 MG/DL — SIGNIFICANT CHANGE UP (ref 0.5–1.3)
EOSINOPHIL # BLD AUTO: 0.05 K/UL — SIGNIFICANT CHANGE UP (ref 0–0.5)
EOSINOPHIL NFR BLD AUTO: 0.4 % — SIGNIFICANT CHANGE UP (ref 0–6)
GLUCOSE SERPL-MCNC: 116 MG/DL — HIGH (ref 70–99)
HCT VFR BLD CALC: 36.7 % — LOW (ref 39–50)
HGB BLD-MCNC: 11.9 G/DL — LOW (ref 13–17)
IMM GRANULOCYTES NFR BLD AUTO: 0.4 % — SIGNIFICANT CHANGE UP (ref 0–1.5)
INR BLD: 6.51 RATIO — CRITICAL HIGH (ref 0.88–1.16)
LYMPHOCYTES # BLD AUTO: 0.81 K/UL — LOW (ref 1–3.3)
LYMPHOCYTES # BLD AUTO: 6.8 % — LOW (ref 13–44)
MAGNESIUM SERPL-MCNC: 2.1 MG/DL — SIGNIFICANT CHANGE UP (ref 1.6–2.6)
MCHC RBC-ENTMCNC: 31.2 PG — SIGNIFICANT CHANGE UP (ref 27–34)
MCHC RBC-ENTMCNC: 32.4 GM/DL — SIGNIFICANT CHANGE UP (ref 32–36)
MCV RBC AUTO: 96.1 FL — SIGNIFICANT CHANGE UP (ref 80–100)
MONOCYTES # BLD AUTO: 1.15 K/UL — HIGH (ref 0–0.9)
MONOCYTES NFR BLD AUTO: 9.7 % — SIGNIFICANT CHANGE UP (ref 2–14)
NEUTROPHILS # BLD AUTO: 9.78 K/UL — HIGH (ref 1.8–7.4)
NEUTROPHILS NFR BLD AUTO: 82.6 % — HIGH (ref 43–77)
PHOSPHATE SERPL-MCNC: 2.9 MG/DL — SIGNIFICANT CHANGE UP (ref 2.5–4.5)
PLATELET # BLD AUTO: 475 K/UL — HIGH (ref 150–400)
POTASSIUM SERPL-MCNC: 4.6 MMOL/L — SIGNIFICANT CHANGE UP (ref 3.5–5.3)
POTASSIUM SERPL-SCNC: 4.6 MMOL/L — SIGNIFICANT CHANGE UP (ref 3.5–5.3)
PROTHROM AB SERPL-ACNC: 76.4 SEC — HIGH (ref 10–13.1)
RBC # BLD: 3.82 M/UL — LOW (ref 4.2–5.8)
RBC # FLD: 13.6 % — SIGNIFICANT CHANGE UP (ref 10.3–14.5)
SODIUM SERPL-SCNC: 136 MMOL/L — SIGNIFICANT CHANGE UP (ref 135–145)
WBC # BLD: 11.85 K/UL — HIGH (ref 3.8–10.5)
WBC # FLD AUTO: 11.85 K/UL — HIGH (ref 3.8–10.5)

## 2018-05-03 PROCEDURE — 70450 CT HEAD/BRAIN W/O DYE: CPT | Mod: 26

## 2018-05-03 PROCEDURE — 99255 IP/OBS CONSLTJ NEW/EST HI 80: CPT | Mod: GC

## 2018-05-03 PROCEDURE — 99233 SBSQ HOSP IP/OBS HIGH 50: CPT | Mod: GC

## 2018-05-03 PROCEDURE — 99232 SBSQ HOSP IP/OBS MODERATE 35: CPT | Mod: GC

## 2018-05-03 RX ORDER — METOPROLOL TARTRATE 50 MG
25 TABLET ORAL DAILY
Qty: 0 | Refills: 0 | Status: DISCONTINUED | OUTPATIENT
Start: 2018-05-03 | End: 2018-05-05

## 2018-05-03 RX ORDER — FUROSEMIDE 40 MG
20 TABLET ORAL DAILY
Qty: 0 | Refills: 0 | Status: DISCONTINUED | OUTPATIENT
Start: 2018-05-03 | End: 2018-05-04

## 2018-05-03 RX ORDER — LISINOPRIL 2.5 MG/1
2.5 TABLET ORAL DAILY
Qty: 0 | Refills: 0 | Status: DISCONTINUED | OUTPATIENT
Start: 2018-05-03 | End: 2018-05-05

## 2018-05-03 RX ADMIN — Medication 25 MILLIGRAM(S): at 08:38

## 2018-05-03 RX ADMIN — Medication 20 MILLIGRAM(S): at 19:25

## 2018-05-03 RX ADMIN — ATORVASTATIN CALCIUM 80 MILLIGRAM(S): 80 TABLET, FILM COATED ORAL at 21:34

## 2018-05-03 RX ADMIN — LISINOPRIL 2.5 MILLIGRAM(S): 2.5 TABLET ORAL at 08:38

## 2018-05-03 RX ADMIN — CLOPIDOGREL BISULFATE 75 MILLIGRAM(S): 75 TABLET, FILM COATED ORAL at 08:38

## 2018-05-03 RX ADMIN — Medication 81 MILLIGRAM(S): at 08:38

## 2018-05-03 NOTE — PROGRESS NOTE ADULT - SUBJECTIVE AND OBJECTIVE BOX
Amara Kennedy MD ([email or web address])  Pager: [phone]  7AM-7PM  For emergent questions between 7PM and 7AM an on weekends after 12PM, please page [phone].      Patient is a 77y old  Male who presents with a chief complaint of hypotension, tachycardia, elevated INR (02 May 2018 17:18)      SUBJECTIVE / OVERNIGHT EVENTS:  No overnight events. Patient states he does not feel dizzy while in bed and has not been out of bed since admission. Orthostatics performed yesterday were normal. HRs in 90-120s on tele.    MEDICATIONS  (STANDING):  aspirin  chewable 81 milliGRAM(s) Oral daily  atorvastatin 80 milliGRAM(s) Oral at bedtime  clopidogrel Tablet 75 milliGRAM(s) Oral daily  lisinopril 2.5 milliGRAM(s) Oral daily  metoprolol succinate ER 25 milliGRAM(s) Oral daily    MEDICATIONS  (PRN):      Vital Signs:  Vital Signs Last 24 Hrs  T(C): 37.1 (03 May 2018 12:09), Max: 37.5 (03 May 2018 04:29)  T(F): 98.7 (03 May 2018 12:09), Max: 99.5 (03 May 2018 04:29)  HR: 98 (03 May 2018 13:09) (98 - 113)  BP: 100/69 (03 May 2018 13:09) (98/67 - 108/70)  BP(mean): --  RR: 19 (03 May 2018 13:09) (18 - 19)  SpO2: 98% (03 May 2018 13:09) (97% - 100%)  Daily Height in cm: 170.18 (02 May 2018 16:08)    Daily Weight in k.3 (03 May 2018 07:35)  CAPILLARY BLOOD GLUCOSE      POCT Blood Glucose.: 110 mg/dL (03 May 2018 07:35)    I&O's Summary    02 May 2018 07:01  -  03 May 2018 07:00  --------------------------------------------------------  IN: 240 mL / OUT: 350 mL / NET: -110 mL        PHYSICAL EXAM  GENERAL: NAD, sitting comfortably in bed, responding to questions appropriately  EYES: conjunctiva and sclera clear  ENMT: Dry mucous membranes; No tonsillar erythema, exudates, or enlargement  NECK: Supple, No JVD  CHEST/LUNG: Normal work of breathing; Clear to auscultation bilaterally; No rales, rhonchi, wheezing, or rubs  HEART: Tachycardic and regular rhythm; No murmurs, rubs, or gallops  ABDOMEN: Soft, Nondistended, Nontender, No guarding; Bowel sounds present  VASCULAR:  2+ Peripheral Pulses, No clubbing, cyanosis, or edema  SKIN: Warm, well perfused, no rashes or lesions  NERVOUS SYSTEM:  Alert & Oriented X3, moving all extremities, no focal deficits    LABS:                        11.9   11.85 )-----------( 475      ( 03 May 2018 07:58 )             36.7     05-03    136  |  100  |  17  ----------------------------<  116<H>  4.6   |  24  |  1.03    Ca    8.5      03 May 2018 06:13  Phos  2.9     05-03  Mg     2.1     05-03    TPro  7.6  /  Alb  3.5  /  TBili  0.4  /  DBili  x   /  AST  29  /  ALT  26  /  AlkPhos  116  05-02    PT/INR - ( 03 May 2018 07:55 )   PT: 76.4 sec;   INR: 6.51 ratio         PTT - ( 03 May 2018 07:55 )  PTT:57.5 sec  CARDIAC MARKERS ( 02 May 2018 13:22 )  x     / 0.42 ng/mL / x     / x     / 2.7 ng/mL          RADIOLOGY & ADDITIONAL TESTS:

## 2018-05-03 NOTE — CONSULT NOTE ADULT - ASSESSMENT
77 man w/ recent AWSTEMI, ischemic cardiomyopathy (20-25% EF), LV thrombus presenting with suprathereputic INR, and concerns of inability to tolerate PO heart failure medications. He is mildly volume overloaded on exam today at [facility] 8. Perfusion is in tact. He is NYHA Class 3, Stage D.   -daily standing weight, strict I/O   -c/w lisinopril 2.5 mg daily  -c/w metoprolol 25 mg XL  -would start furosemide 20 mg PO daily   -will observe patient on oral therapy with plan to up titrate vasodilators at later time, as BP allows. May eventually need RHC depending on course.   -HF to follow     Please call me at [phone] for any further questions up till 5 pm. For after hours, please call the covering cardiology [place] fellow at [phone] for any further assistance.

## 2018-05-03 NOTE — PROGRESS NOTE ADULT - ASSESSMENT
77M w/ no PMHx prior to last admission, who was admitted on 4/18 w/ AWSTEMI. On 4/18 he had a KYRA to the pLAD (w/ IABP). On 4/19 he had a staged PCI to D1.  Also found to have an LV thrombus in the setting of LVEF:25% (nl RVSF). He was D/C'ed on OMT (ACEI, B-blocker) with a LifeVest.     He was readmitted on 5/2 w/ supratherapeutic INR and sinus tachy. By report the patient was not tolerating low dose ACEI and B-blocker as an outpatient. Sinus tachycardia likely in the setting of his low EF (CO is HR dependent). His BP is consistent with his degree of LV dysfunction. The fact that he is having symptoms of lightheadedness and dizziness on low doses of ACEI and B-blockers is concerning. No clinical signs of end organ hypoperfusion.     Plan:    1. CAD with AW-STEMI  -c/w ASA 81 and Plavix 75 (can likely stop ASA in 1m if cleared by Interventional Cardiologist (Dr. Cole Rees))  -c/w Lipitor 80  -c/w B-blocker and ACEI at current doses for now    2.  Acute HFrEF (2/2 ICM)  -c/w Toprol 25  -c/w Lisinopril 2.5  -c/w LifeVest  -Will discuss case w/ HF team today  -Repeat TTE - looking for early aneurysm formation (perform w/ Definity)    3.  LV thombus  -Hold Coumadin in the setting of supratherapeutic INR  -Goal INR 2-3    General Cardiology will follow, the patient does not have an outpatient Cardiologist and can follow up in the Cardiology Clinic as an outpatient [phone] - he needs to have an appointment scheduled for him prior to D/C    IRAIS Kinney MD   Cardiology Fellow  (p): [phone] 77M w/ no PMHx prior to last admission, who was admitted on 4/18 w/ AWSTEMI. On 4/18 he had a KYRA to the pLAD (w/ IABP). On 4/19 he had a staged PCI to D1.  Also found to have an LV thrombus in the setting of LVEF:25% (nl RVSF). He was D/C'ed on OMT (ACEI, B-blocker) with a LifeVest.     He was readmitted on 5/2 w/ supratherapeutic INR and sinus tachy. By report the patient was not tolerating low dose ACEI and B-blocker as an outpatient. Sinus tachycardia likely in the setting of his low EF (CO is HR dependent). His BP is consistent with his degree of LV dysfunction. The fact that he is having symptoms of lightheadedness and dizziness on low doses of ACEI and B-blockers is concerning. No clinical signs of end organ hypoperfusion.     Plan:    1. CAD with AW-STEMI  -c/w ASA 81 and Plavix 75 (can likely stop ASA in 1m if cleared by Interventional Cardiologist (Dr. Cole Rees))  -c/w Lipitor 80  -c/w B-blocker and ACEI at current doses for now    2.  Acute HFrEF (2/2 ICM)  -c/w Toprol 25  -c/w Lisinopril 2.5  -c/w LifeVest  -Will discuss case w/ HF team today  -Check VBG w/ lactate  -Repeat TTE - looking for early aneurysm formation (perform w/ Definity)    3.  LV thombus  -Hold Coumadin in the setting of supratherapeutic INR  -Goal INR 2-3    General Cardiology will follow, the patient does not have an outpatient Cardiologist and can follow up in the Cardiology Clinic as an outpatient [phone] - he needs to have an appointment scheduled for him prior to D/C    IRAIS Kinney MD   Cardiology Fellow  (p): [phone]

## 2018-05-03 NOTE — CONSULT NOTE ADULT - SUBJECTIVE AND OBJECTIVE BOX
Date of Consult: 5/3/2018    CHIEF COMPLAINT: heart failure     HISTORY OF PRESENT ILLNESS:  Sunita Ortiz is a 77 man w/ no PMHx prior to last admission, who was recently admitted on 4/18 w/ AWSTEMI. On 4/18 he had a KYRA to the pLAD (w/ IABP). On 4/19 he had a staged PCI to D1. Also found to have an LV thrombus in the setting of LVEF: 25% (nl RVSF). He was sent home with a LifeVest. Prior to admission, he was seen in the medicine clinic with INR was 7.3 was found to be hypotensive and tachy so he was sent to the ED. The patient reports mild dizziness and lightheadedness which is temporally related to the time he takes his medications. He denies CP, SOB, palpitations, LE edema, orthopnea, PND. He reports compliance with his medications. Heart failure team ask to optimize patient.     Allergies    No Known Allergies    Intolerances    MEDICATIONS:  aspirin  chewable 81 milliGRAM(s) Oral daily  clopidogrel Tablet 75 milliGRAM(s) Oral daily  furosemide    Tablet 20 milliGRAM(s) Oral daily  lisinopril 2.5 milliGRAM(s) Oral daily  metoprolol succinate ER 25 milliGRAM(s) Oral daily  atorvastatin 80 milliGRAM(s) Oral at bedtime    PAST MEDICAL & SURGICAL HISTORY:  Systolic heart failure  Coronary artery disease  H/O cardiac catheterization  History of tonsillectomy    FAMILY HISTORY:  No pertinent family history in first degree relatives      SOCIAL HISTORY:    [ ] Smoker- in distant past  [ ] Alcohol      REVIEW OF SYSTEMS:  See HPI. Otherwise, 10 point ROS done and otherwise negative.    PHYSICAL EXAM:  T(C): 37.3 (05-03-18 @ 16:27), Max: 37.5 (05-03-18 @ 04:29)  HR: 112 (05-03-18 @ 16:27) (98 - 113)  BP: 102/72 (05-03-18 @ 16:27) (98/67 - 102/72)  RR: 20 (05-03-18 @ 16:27) (18 - 20)  SpO2: 97% (05-03-18 @ 16:27) (97% - 98%)  Wt(kg): --  I&O's Summary    02 May 2018 07:01  -  03 May 2018 07:00  --------------------------------------------------------  IN: 240 mL / OUT: 350 mL / NET: -110 mL    03 May 2018 07:01  -  03 May 2018 18:00  --------------------------------------------------------  IN: 360 mL / OUT: 0 mL / NET: 360 mL      Appearance: Normal	  HEENT:   Normal oral mucosa, PERRL, EOMI	  Lymphatic: No lymphadenopathy  Cardiovascular: Normal S1 S2, JVP is 10  Respiratory: Lungs clear to auscultation	  Psychiatry: A & O x 3, Mood & affect appropriate  Gastrointestinal:  Soft, Non-tender, + BS	  Skin: No rashes, No ecchymoses, No cyanosis	  Neurologic: Non-focal  Extremities: Normal range of motion, No clubbing, cyanosis or edema  Vascular: Peripheral pulses palpable 2+ bilaterally    LABS:	 	    CBC Full  -  ( 03 May 2018 07:58 )  WBC Count : 11.85 K/uL  Hemoglobin : 11.9 g/dL  Hematocrit : 36.7 %  Platelet Count - Automated : 475 K/uL  Mean Cell Volume : 96.1 fl  Mean Cell Hemoglobin : 31.2 pg  Mean Cell Hemoglobin Concentration : 32.4 gm/dL  Auto Neutrophil # : 9.78 K/uL  Auto Lymphocyte # : 0.81 K/uL  Auto Monocyte # : 1.15 K/uL  Auto Eosinophil # : 0.05 K/uL  Auto Basophil # : 0.01 K/uL  Auto Neutrophil % : 82.6 %  Auto Lymphocyte % : 6.8 %  Auto Monocyte % : 9.7 %  Auto Eosinophil % : 0.4 %  Auto Basophil % : 0.1 %    05-03    136  |  100  |  17  ----------------------------<  116<H>  4.6   |  24  |  1.03  05-02    139  |  100  |  19  ----------------------------<  202<H>  4.3   |  26  |  1.12    Ca    8.5      03 May 2018 06:13  Ca    9.2      02 May 2018 13:22  Phos  2.9     05-03  Mg     2.1     05-03    TPro  7.6  /  Alb  3.5  /  TBili  0.4  /  DBili  x   /  AST  29  /  ALT  26  /  AlkPhos  116  05-02    CARDIAC MARKERS    TELEMETRY: NSR 100s	      PREVIOUS DIAGNOSTIC TESTING:    [ ] Echocardiogram:  < from: TTE with Doppler (w/Cont) (04.19.18 @ 09:07) >  Conclusions:  1. Severe segmental left ventricular systolic dysfunction.  Akinesis of the mid and apical septum, apex, anterior wall.   Mid to apical inferior and inferolateral wall are severely  hypokinetic.   Endocardial visualizationenhanced with  intravenous injection of echo contrast (Definity). Left  ventricular thrombus seen.  2. Normal right ventricular size and function.  *** No previous Echo exam.    < end of copied text >    [ ]  Catheterization:  [ ] Stress Test:  	  	  ASSESSMENT/PLAN: 	    Danish Mccloud MD

## 2018-05-03 NOTE — CONSULT NOTE ADULT - ATTENDING COMMENTS
77 man w/ no prior medical history who presented on 4/18 w/ CP and was found to have an anterior wall STEMI (sxs started preceding day) and underwent PCI to pLAD (w/ IABP); subsequently had a staged PCI to D1. TTE showed EF 20-25% with LV thrombus and was started on anticoagulation. Was discharged 4/26 but then represented on 5/2 when was seen in medicine clinic appt tachyardic and hypotensive. Per patient he has been compliant with medications and been wearing LifeVest. States he gets dizzy at times but otherwise denies PND/LE edema. Was discharged on lisionpril 2.5 mg daily, ASA, plavix, coumadin, toprol XL 25 mg, and lipitor. On exam, JVD approx 8 cm with HJR, RRR, no S3 audible, clear lungs, no pedal edema, lukewarm. Labs reviewed WBC 11.8, Hb 11.9, BUN/Cr 11/1.03, INR 6. TTE 4/19 EF 20-25% (segmental; aneurysmal apex), nl RV function, LVEDD 4.2 cm. EKG 5/2 NSR, nl axis, MONICA V1-V5. Currently appears stage C HF, NYHA class II with some mild volume overload.   - would give lasix 20 mg PO daily  - continue lisinopril 2.5 and toprol XL 25 mg daily for now  - standing weights daily  - repeat TTE  - continue antiplatelet therapy and lipitor  - hold AC until INR trending down  - discussed fluid restriction/sodium restriction and importance of checking weights
Patient interviewed and examined.  Chart reviewed and note edited where appropriate.  Case discussed with fellow.  Agree w/ Assessment and Plan as outlined.    Len Butler MD [facility]  Spectra:  [phone]  Office: [phone]

## 2018-05-03 NOTE — PROGRESS NOTE ADULT - SUBJECTIVE AND OBJECTIVE BOX
Patient seen and examined at bedside on 4 Zhou    Overnight Events: The patient's B-blocker and ACEI were NOT given on 5/2    Review Of Systems: No chest pain, shortness of breath, or palpitations. The patient has not gotten out of bed since being admitted            Current Meds:  aspirin  chewable 81 milliGRAM(s) Oral daily  atorvastatin 80 milliGRAM(s) Oral at bedtime  clopidogrel Tablet 75 milliGRAM(s) Oral daily  lisinopril 2.5 milliGRAM(s) Oral daily  metoprolol succinate ER 25 milliGRAM(s) Oral daily    Vitals:  T(F): 99.5 (05-03), Max: 99.5 (05-03)  HR: 113 (05-03) (102 - 113)  BP: 98/67 (05-03) (98/67 - 117/85)  RR: 18 (05-03)  SpO2: 97% on RA    I&O's Summary    02 May 2018 07:01  -  03 May 2018 06:47  --------------------------------------------------------  IN: 120 mL / OUT: 150 mL / NET: -30 mL    Physical Exam:  Appearance: No acute distress; well appearing, laying flat, breathing comfortably on RA  Eyes: PERRL, EOMI, pink conjunctiva  HENT: Normal oral mucosa  Cardiovascular: RRR, S1, S2, no murmurs, rubs, or gallops; no edema; no JVD  Respiratory: Clear to auscultation bilaterally  Gastrointestinal: soft, non-tender, non-distended with normal bowel sounds  Musculoskeletal: No clubbing; no joint deformity   Neurologic: Non-focal  Lymphatic: No lymphadenopathy  Psychiatry: AAOx3, mood & affect appropriate  Skin: No rashes, ecchymoses, or cyanosis, LE warm    AM LABS PENDING    PT/INR - ( 02 May 2018 13:22 )   PT: 82.7 sec;   INR: 7.29 ratio    PTT - ( 02 May 2018 13:22 )  PTT:53.9 sec    Thyroid Stimulating Hormone, Serum: 2.52 uIU/mL    Echo: Repeat TTE ordered, pending    Interpretation of Telemetry: sinus 90s - 120s

## 2018-05-03 NOTE — PROGRESS NOTE ADULT - ASSESSMENT
78yo M PMH recently diagnosed CAD s/p 2DES (pLAD, D1) on 4/18 and 4/19 c/b LV thrombus now on coumadin, HFpEF (EF 20%) who was sent home with life vest now presenting from INR clinic with supratherapeutic INR and sinus tachycardia with dizziness.

## 2018-05-03 NOTE — PROGRESS NOTE ADULT - PROBLEM SELECTOR PLAN 1
Patient reporting dizziness with head spinning since day of discharge (4/26), significantly affect ADLs, likely secondary to sinus tachycardia vs hypotension (orthostasis vs dehydration vs intolerance to ACE-I) vs possible cardiac aneurysm  - will check orthostatic vitals daily, normal so far  - will monitor on home medications for now  - ordered TTE with Definity to eval for cardiac aneurysm (concern for aneurysm based on EKG findings)

## 2018-05-04 DIAGNOSIS — I31.3 PERICARDIAL EFFUSION (NONINFLAMMATORY): ICD-10-CM

## 2018-05-04 LAB
ANION GAP SERPL CALC-SCNC: 12 MMOL/L — SIGNIFICANT CHANGE UP (ref 5–17)
APTT BLD: 56.3 SEC — HIGH (ref 27.5–37.4)
BASOPHILS # BLD AUTO: 0.01 K/UL — SIGNIFICANT CHANGE UP (ref 0–0.2)
BASOPHILS NFR BLD AUTO: 0.1 % — SIGNIFICANT CHANGE UP (ref 0–2)
BUN SERPL-MCNC: 17 MG/DL — SIGNIFICANT CHANGE UP (ref 7–23)
CALCIUM SERPL-MCNC: 8.4 MG/DL — SIGNIFICANT CHANGE UP (ref 8.4–10.5)
CHLORIDE SERPL-SCNC: 99 MMOL/L — SIGNIFICANT CHANGE UP (ref 96–108)
CK MB CFR SERPL CALC: 1.1 NG/ML — SIGNIFICANT CHANGE UP (ref 0–6.7)
CK SERPL-CCNC: 36 U/L — SIGNIFICANT CHANGE UP (ref 30–200)
CO2 SERPL-SCNC: 22 MMOL/L — SIGNIFICANT CHANGE UP (ref 22–31)
CREAT SERPL-MCNC: 0.9 MG/DL — SIGNIFICANT CHANGE UP (ref 0.5–1.3)
EOSINOPHIL # BLD AUTO: 0.02 K/UL — SIGNIFICANT CHANGE UP (ref 0–0.5)
EOSINOPHIL NFR BLD AUTO: 0.2 % — SIGNIFICANT CHANGE UP (ref 0–6)
GAS PNL BLDV: SIGNIFICANT CHANGE UP
GLUCOSE SERPL-MCNC: 129 MG/DL — HIGH (ref 70–99)
HCT VFR BLD CALC: 32.9 % — LOW (ref 39–50)
HGB BLD-MCNC: 11 G/DL — LOW (ref 13–17)
IMM GRANULOCYTES NFR BLD AUTO: 0.3 % — SIGNIFICANT CHANGE UP (ref 0–1.5)
INR BLD: 3.33 RATIO — HIGH (ref 0.88–1.16)
INR BLD: 7.51 RATIO — CRITICAL HIGH (ref 0.88–1.16)
LACTATE BLDV-MCNC: 0.9 MMOL/L — SIGNIFICANT CHANGE UP (ref 0.7–2)
LYMPHOCYTES # BLD AUTO: 0.68 K/UL — LOW (ref 1–3.3)
LYMPHOCYTES # BLD AUTO: 5.5 % — LOW (ref 13–44)
MAGNESIUM SERPL-MCNC: 2 MG/DL — SIGNIFICANT CHANGE UP (ref 1.6–2.6)
MCHC RBC-ENTMCNC: 30.6 PG — SIGNIFICANT CHANGE UP (ref 27–34)
MCHC RBC-ENTMCNC: 33.4 GM/DL — SIGNIFICANT CHANGE UP (ref 32–36)
MCV RBC AUTO: 91.6 FL — SIGNIFICANT CHANGE UP (ref 80–100)
MONOCYTES # BLD AUTO: 1.06 K/UL — HIGH (ref 0–0.9)
MONOCYTES NFR BLD AUTO: 8.6 % — SIGNIFICANT CHANGE UP (ref 2–14)
NEUTROPHILS # BLD AUTO: 10.54 K/UL — HIGH (ref 1.8–7.4)
NEUTROPHILS NFR BLD AUTO: 85.3 % — HIGH (ref 43–77)
PHOSPHATE SERPL-MCNC: 2.5 MG/DL — SIGNIFICANT CHANGE UP (ref 2.5–4.5)
PLATELET # BLD AUTO: 491 K/UL — HIGH (ref 150–400)
POTASSIUM SERPL-MCNC: 4.2 MMOL/L — SIGNIFICANT CHANGE UP (ref 3.5–5.3)
POTASSIUM SERPL-SCNC: 4.2 MMOL/L — SIGNIFICANT CHANGE UP (ref 3.5–5.3)
PROTHROM AB SERPL-ACNC: 37.2 SEC — HIGH (ref 9.8–12.7)
PROTHROM AB SERPL-ACNC: 88.4 SEC — HIGH (ref 10–13.1)
RBC # BLD: 3.59 M/UL — LOW (ref 4.2–5.8)
RBC # FLD: 13.7 % — SIGNIFICANT CHANGE UP (ref 10.3–14.5)
SODIUM SERPL-SCNC: 133 MMOL/L — LOW (ref 135–145)
TROPONIN T SERPL-MCNC: 0.24 NG/ML — HIGH (ref 0–0.06)
WBC # BLD: 12.35 K/UL — HIGH (ref 3.8–10.5)
WBC # FLD AUTO: 12.35 K/UL — HIGH (ref 3.8–10.5)

## 2018-05-04 PROCEDURE — 93010 ELECTROCARDIOGRAM REPORT: CPT | Mod: 76

## 2018-05-04 PROCEDURE — 99233 SBSQ HOSP IP/OBS HIGH 50: CPT | Mod: GC

## 2018-05-04 PROCEDURE — 93306 TTE W/DOPPLER COMPLETE: CPT | Mod: 26

## 2018-05-04 RX ORDER — SODIUM CHLORIDE 9 MG/ML
500 INJECTION INTRAMUSCULAR; INTRAVENOUS; SUBCUTANEOUS ONCE
Qty: 0 | Refills: 0 | Status: COMPLETED | OUTPATIENT
Start: 2018-05-04 | End: 2018-05-04

## 2018-05-04 RX ORDER — ACETAMINOPHEN 500 MG
650 TABLET ORAL ONCE
Qty: 0 | Refills: 0 | Status: COMPLETED | OUTPATIENT
Start: 2018-05-04 | End: 2018-05-04

## 2018-05-04 RX ORDER — PHYTONADIONE (VIT K1) 5 MG
5 TABLET ORAL ONCE
Qty: 0 | Refills: 0 | Status: COMPLETED | OUTPATIENT
Start: 2018-05-04 | End: 2018-05-04

## 2018-05-04 RX ORDER — FUROSEMIDE 40 MG
20 TABLET ORAL DAILY
Qty: 0 | Refills: 0 | Status: DISCONTINUED | OUTPATIENT
Start: 2018-05-04 | End: 2018-05-04

## 2018-05-04 RX ORDER — SODIUM CHLORIDE 9 MG/ML
1000 INJECTION INTRAMUSCULAR; INTRAVENOUS; SUBCUTANEOUS
Qty: 0 | Refills: 0 | Status: DISCONTINUED | OUTPATIENT
Start: 2018-05-04 | End: 2018-05-04

## 2018-05-04 RX ADMIN — ATORVASTATIN CALCIUM 80 MILLIGRAM(S): 80 TABLET, FILM COATED ORAL at 21:27

## 2018-05-04 RX ADMIN — CLOPIDOGREL BISULFATE 75 MILLIGRAM(S): 75 TABLET, FILM COATED ORAL at 11:12

## 2018-05-04 RX ADMIN — Medication 5 MILLIGRAM(S): at 11:54

## 2018-05-04 RX ADMIN — Medication 20 MILLIGRAM(S): at 05:01

## 2018-05-04 RX ADMIN — Medication 81 MILLIGRAM(S): at 11:12

## 2018-05-04 RX ADMIN — LISINOPRIL 2.5 MILLIGRAM(S): 2.5 TABLET ORAL at 05:01

## 2018-05-04 RX ADMIN — Medication 650 MILLIGRAM(S): at 04:05

## 2018-05-04 RX ADMIN — Medication 25 MILLIGRAM(S): at 05:02

## 2018-05-04 RX ADMIN — SODIUM CHLORIDE 2000 MILLILITER(S): 9 INJECTION INTRAMUSCULAR; INTRAVENOUS; SUBCUTANEOUS at 11:10

## 2018-05-04 RX ADMIN — Medication 650 MILLIGRAM(S): at 04:56

## 2018-05-04 NOTE — PROGRESS NOTE ADULT - PROBLEM SELECTOR PLAN 6
DVT PPX: Supratherapeutic INR  DIET: Vegetarian/DASH    Dispo: Pending TTE
Recent AWSTEMI with 2DES to pLAD and D1 on 4/18 and 4/19  - c/w asa, plavix, lipitor  - CE x 2 negative  - no new ischemic changes on EKG

## 2018-05-04 NOTE — PROGRESS NOTE ADULT - PROBLEM SELECTOR PLAN 3
Patient reporting dizziness with head spinning since day of discharge (4/26), significantly affect ADLs, likely secondary to sinus tachycardia vs hypotension (orthostasis vs dehydration vs intolerance to ACE-I) vs cardiac aneurysm vs cerebellar infarct.   - CT head showing age indeterminant R post inferior cerebellar artery territory infarct which could also be contributing to dizziness (however, nystagmus is horizontal in direction and not vertical)  - bed rest, fall precautions  - consider neurology consult

## 2018-05-04 NOTE — PROGRESS NOTE ADULT - ASSESSMENT
78yo M PMH recently diagnosed CAD s/p 2DES (pLAD, D1) on 4/18 and 4/19 c/b LV thrombus now on coumadin, HFpEF (EF 20%) who was sent home with life vest now presenting from INR clinic with supratherapeutic INR and sinus tachycardia with dizziness. 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 Also with age indeterminant infarct in R post inferior cerebellar artery territory. Plan for transfer to CCU.

## 2018-05-04 NOTE — PROGRESS NOTE ADULT - PROBLEM SELECTOR PLAN 2
On coumadin 5mg at home for LV thrombus, possible hemorrhagic pericardial effusion, Hgb slowly downtrending  - hold coumadin  - give vitamin K 5mg oral today  - Fall precautions

## 2018-05-04 NOTE — PROGRESS NOTE ADULT - SUBJECTIVE AND OBJECTIVE BOX
24H hour events: No acute events overnight. Patient feels well without any dizziness, headaches, chest pain, sob. Laying in flat comfortably.     MEDICATIONS:  aspirin  chewable 81 milliGRAM(s) Oral daily  clopidogrel Tablet 75 milliGRAM(s) Oral daily  lisinopril 2.5 milliGRAM(s) Oral daily  metoprolol succinate ER 25 milliGRAM(s) Oral daily  atorvastatin 80 milliGRAM(s) Oral at bedtime    REVIEW OF SYSTEMS:  Complete 10point ROS negative except as documented above.    PHYSICAL EXAM:  T(C): 37.5 (05-04-18 @ 13:22), Max: 37.5 (05-04-18 @ 13:22)  HR: 101 (05-04-18 @ 16:00) (94 - 114)  BP: 97/68 (05-04-18 @ 16:00) (93/63 - 114/68)  RR: 13 (05-04-18 @ 16:00) (13 - 21)  SpO2: 97% (05-04-18 @ 16:00) (95% - 98%)  Wt(kg): --  I&O's Summary    03 May 2018 07:01  -  04 May 2018 07:00  --------------------------------------------------------  IN: 360 mL / OUT: 100 mL / NET: 260 mL    04 May 2018 07:01  -  04 May 2018 16:38  --------------------------------------------------------  IN: 500 mL / OUT: 300 mL / NET: 200 mL    Appearance: Normal	  HEENT:   Normal oral mucosa, PERRL, EOMI	  Lymphatic: No lymphadenopathy  Cardiovascular: Normal S1 S2, JVP at 6-7  Respiratory: Lungs clear to auscultation	  Psychiatry: A & O x 3, Mood & affect appropriate  Gastrointestinal:  Soft, Non-tender, + BS	  Skin: No rashes, No ecchymoses, No cyanosis	  Neurologic: Non-focal  Extremities: Normal range of motion, No clubbing, cyanosis or edema  Vascular: Peripheral pulses palpable 2+ bilaterally    LABS:	 	    CBC Full  -  ( 04 May 2018 07:26 )  WBC Count : 12.35 K/uL  Hemoglobin : 11.0 g/dL  Hematocrit : 32.9 %  Platelet Count - Automated : 491 K/uL  Mean Cell Volume : 91.6 fl  Mean Cell Hemoglobin : 30.6 pg  Mean Cell Hemoglobin Concentration : 33.4 gm/dL  Auto Neutrophil # : 10.54 K/uL  Auto Lymphocyte # : 0.68 K/uL  Auto Monocyte # : 1.06 K/uL  Auto Eosinophil # : 0.02 K/uL  Auto Basophil # : 0.01 K/uL  Auto Neutrophil % : 85.3 %  Auto Lymphocyte % : 5.5 %  Auto Monocyte % : 8.6 %  Auto Eosinophil % : 0.2 %  Auto Basophil % : 0.1 %    05-04    133<L>  |  99  |  17  ----------------------------<  129<H>  4.2   |  22  |  0.90  05-03    136  |  100  |  17  ----------------------------<  116<H>  4.6   |  24  |  1.03    Ca    8.4      04 May 2018 04:55  Ca    8.5      03 May 2018 06:13  Phos  2.5     05-04  Phos  2.9     05-03  Mg     2.0     05-04  Mg     2.1     05-03    TELEMETRY: sinus tachycardia at 100-105      PREVIOUS DIAGNOSTIC TESTING:    [ ] Echocardiogram:  < from: Transthoracic Echocardiogram (05.04.18 @ 06:48) >  ------------------------------------------------------------------------  Conclusions:  1. Severe segmental left ventricular systolic dysfunction.  The inferolateral wall is severely hypokinetic/akinetic.  Large apical aneurysm.  The mid to apical septum and  anterior wall are akintic.   Endocardial visualization  enhanced with intravenous injection of echo contrast  (Definity). No left ventricular thrombus.  2. There is decreased diastolic filling of the right  ventricle.  3. Moderate to large anterior pericardial effusion lateral  to the right ventricle.   The pericardial effusion measures  0.8 cm adjacent to the right ventricle as seen in the  subcostal view.  Fibrinous material is seen within the  pericardial space.  There is greater than 40% transmitral  respirophasic flow varation which is sensitive to but not  specific flow pericardial tamponade.  4. Left pleural effusion.  Overall findings constent with elevated pericardial  pressure and early cardiac tamponade.  Case discussed with Dr. Ku    < end of copied text >    [ ]  Catheterization:  [ ] Stress Test:  	  	  ASSESSMENT/PLAN:

## 2018-05-04 NOTE — PROGRESS NOTE ADULT - PROBLEM SELECTOR PLAN 7
DVT PPX: Supratherapeutic INR  DIET: Vegetarian/DASH    Dispo: Transfer to CCU for pericardial effusion with possible tamponade physiology

## 2018-05-04 NOTE — PROGRESS NOTE ADULT - PROBLEM SELECTOR PLAN 1
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 +JVD, hypotension, tachycardia, no pulsus paradoxus or muffled heart sounds  - will likely need pericardiocentesis  - transfer to CCU for further monitoring/management  - will give 500cc bolus for hypotension and d/c lasix  - will give 5mg oral vitamin K to decrease supratherapeutic INR

## 2018-05-04 NOTE — PROGRESS NOTE ADULT - PROBLEM SELECTOR PLAN 5
Recent AWSTEMI with 2DES to pLAD and D1 on 4/18 and 4/19  - c/w asa, plavix, lipitor  - CE x 1 negative, will send 2nd set for thoroughness, doubt active ischemia as EKG with no new ischemic changes from prior and patient without chest pain/palpitations. Patient complaint with medications.
Leukocytosis and thrombocytosis, patient may be dehydrated, no s/s of infection at this time, will monitor off abx for now, improving

## 2018-05-04 NOTE — PROGRESS NOTE ADULT - SUBJECTIVE AND OBJECTIVE BOX
Amara Kennedy MD (PGY-1)  Pager: [phone]  7AM-7PM  For emergent questions between 7PM and 7AM an on weekends after 12PM, please page [phone].      Patient is a 77y old  Male who presents with a chief complaint of hypotension, tachycardia, elevated INR (02 May 2018 17:18)      SUBJECTIVE / OVERNIGHT EVENTS:  Patient complaining of pleuritic left-sided chest pressure overnight, improved with Tylenol. No complaints of dizziness while in bed. CT head performed yesterday showing age indeterminant infarct in R post inferior cerebellar artery territory. TTE this morning showing mod-large pericardial effusion with early tamponade physiology.    MEDICATIONS  (STANDING):  aspirin  chewable 81 milliGRAM(s) Oral daily  atorvastatin 80 milliGRAM(s) Oral at bedtime  clopidogrel Tablet 75 milliGRAM(s) Oral daily  lisinopril 2.5 milliGRAM(s) Oral daily  metoprolol succinate ER 25 milliGRAM(s) Oral daily  phytonadione   Solution 5 milliGRAM(s) Oral once    MEDICATIONS  (PRN):      Vital Signs:  Vital Signs Last 24 Hrs  T(C): 36.7 (04 May 2018 04:04), Max: 37.3 (03 May 2018 16:27)  T(F): 98.1 (04 May 2018 04:04), Max: 99.2 (03 May 2018 16:27)  HR: 95 (04 May 2018 08:36) (95 - 114)  BP: 114/68 (04 May 2018 04:04) (98/64 - 114/68)  BP(mean): --  RR: 17 (04 May 2018 04:04) (16 - 20)  SpO2: 97% (04 May 2018 04:04) (96% - 98%)  Daily     Daily Weight in k.5 (04 May 2018 10:34)  CAPILLARY BLOOD GLUCOSE        I&O's Summary    03 May 2018 07:01  -  04 May 2018 07:00  --------------------------------------------------------  IN: 360 mL / OUT: 100 mL / NET: 260 mL    04 May 2018 07:01  -  04 May 2018 11:21  --------------------------------------------------------  IN: 0 mL / OUT: 300 mL / NET: -300 mL        PHYSICAL EXAM  GENERAL: Appears tired, no acute distress, resting comfortably in bed  HEAD:  Atraumatic, Normocephalic  EYES: conjunctiva and sclera clear  NECK: Supple, +JVD  CHEST/LUNG: Clear to auscultation bilaterally; No wheeze, rhonchi or rales  HEART: Tachycardic and regular rhythm; No murmurs, rubs, or gallops  ABDOMEN: Soft, Nontender, No guarding, Nondistended; Bowel sounds present  EXTREMITIES:  2+ Peripheral Pulses, No clubbing, cyanosis, or edema  NEURO: AAOx3  SKIN: No rashes or lesions    LABS:                        11.0   12.35 )-----------( 491      ( 04 May 2018 07:26 )             32.9     05-04    133<L>  |  99  |  17  ----------------------------<  129<H>  4.2   |  22  |  0.90    Ca    8.4      04 May 2018 04:55  Phos  2.5     05-04  Mg     2.0     05-04    TPro  7.6  /  Alb  3.5  /  TBili  0.4  /  DBili  x   /  AST  29  /  ALT  26  /  AlkPhos  116  05-02    PT/INR - ( 04 May 2018 07:29 )   PT: 88.4 sec;   INR: 7.51 ratio         PTT - ( 04 May 2018 07:29 )  PTT:56.3 sec  CARDIAC MARKERS ( 04 May 2018 04:55 )  x     / 0.24 ng/mL / 36 U/L / x     / 1.1 ng/mL  CARDIAC MARKERS ( 02 May 2018 13:22 )  x     / 0.42 ng/mL / x     / x     / 2.7 ng/mL          RADIOLOGY & ADDITIONAL TESTS: Amara Kennedy MD (PGY-1)  Pager: [phone]  7AM-7PM  For emergent questions between 7PM and 7AM an on weekends after 12PM, please page [phone].      Patient is a 77y old  Male who presents with a chief complaint of hypotension, tachycardia, elevated INR (02 May 2018 17:18)      SUBJECTIVE / OVERNIGHT EVENTS:  Patient complaining of pleuritic left-sided chest pressure overnight, improved with Tylenol. No complaints of dizziness while in bed. CT head performed yesterday showing age indeterminant infarct in R post inferior cerebellar artery territory. TTE this morning showing mod-large pericardial effusion with early tamponade physiology.    MEDICATIONS  (STANDING):  aspirin  chewable 81 milliGRAM(s) Oral daily  atorvastatin 80 milliGRAM(s) Oral at bedtime  clopidogrel Tablet 75 milliGRAM(s) Oral daily  lisinopril 2.5 milliGRAM(s) Oral daily  metoprolol succinate ER 25 milliGRAM(s) Oral daily  phytonadione   Solution 5 milliGRAM(s) Oral once    MEDICATIONS  (PRN):      Vital Signs:  Vital Signs Last 24 Hrs  T(C): 36.7 (04 May 2018 04:04), Max: 37.3 (03 May 2018 16:27)  T(F): 98.1 (04 May 2018 04:04), Max: 99.2 (03 May 2018 16:27)  HR: 95 (04 May 2018 08:36) (95 - 114)  BP: 114/68 (04 May 2018 04:04) (98/64 - 114/68)  BP(mean): --  RR: 17 (04 May 2018 04:04) (16 - 20)  SpO2: 97% (04 May 2018 04:04) (96% - 98%)  Daily     Daily Weight in k.5 (04 May 2018 10:34)  CAPILLARY BLOOD GLUCOSE        I&O's Summary    03 May 2018 07:01  -  04 May 2018 07:00  --------------------------------------------------------  IN: 360 mL / OUT: 100 mL / NET: 260 mL    04 May 2018 07:01  -  04 May 2018 11:21  --------------------------------------------------------  IN: 0 mL / OUT: 300 mL / NET: -300 mL        PHYSICAL EXAM  GENERAL: Appears tired, no acute distress, resting comfortably in bed  HEAD:  Atraumatic, Normocephalic  EYES: conjunctiva and sclera clear  NECK: Supple, +JVD  CHEST/LUNG: Clear to auscultation bilaterally; No wheeze, rhonchi or rales  HEART: Tachycardic and regular rhythm; No murmurs, rubs, or gallops, no muffled heart sounds, no pulsus paradoxus  ABDOMEN: Soft, Nontender, No guarding, Nondistended; Bowel sounds present  EXTREMITIES:  2+ Peripheral Pulses, No clubbing, cyanosis, or edema  NEURO: AAOx3, moving all extremities, responding to questions appropriately  SKIN: No rashes or lesions    LABS:                        11.0   12.35 )-----------( 491      ( 04 May 2018 07:26 )             32.9     05-04    133<L>  |  99  |  17  ----------------------------<  129<H>  4.2   |  22  |  0.90    Ca    8.4      04 May 2018 04:55  Phos  2.5     05-04  Mg     2.0     05-04    TPro  7.6  /  Alb  3.5  /  TBili  0.4  /  DBili  x   /  AST  29  /  ALT  26  /  AlkPhos  116  05-02    PT/INR - ( 04 May 2018 07:29 )   PT: 88.4 sec;   INR: 7.51 ratio         PTT - ( 04 May 2018 07:29 )  PTT:56.3 sec  CARDIAC MARKERS ( 04 May 2018 04:55 )  x     / 0.24 ng/mL / 36 U/L / x     / 1.1 ng/mL  CARDIAC MARKERS ( 02 May 2018 13:22 )  x     / 0.42 ng/mL / x     / x     / 2.7 ng/mL          RADIOLOGY & ADDITIONAL TESTS:  < from: CT Head No Cont (18 @ 20:18) >  FINDINGS:  Ventricles and sulci are within normal limits for the patient's stated   age. Right posterior inferior cerebellar infarction is age-indeterminate.   There are minor periventricular and hemispheric white matter lucencies   consistent with minor microvascular ischemic change. There is no   intraparenchymal hematoma, mass effect or midlineshift. No abnormal   extra-axial fluid collections are present. There is no evidence of acute   transcortical territorial infarction.    The calvarium is intact. The visualized intraorbital compartments,   paranasal sinuses and tympanomastoid cavities appear free of acute   disease.      IMPRESSION:  No acute intracranial hemorrhage.  Age indeterminate right posterior inferior cerebellar artery distribution   infarction.    < end of copied text >    < from: Transthoracic Echocardiogram (18 @ 06:48) >  Conclusions:  1. Severe segmental left ventricular systolic dysfunction.  The inferolateral wall is severely hypokinetic/akinetic.  Large apical aneurysm.  The mid to apical septum and  anterior wall are akintic.   Endocardial visualization  enhanced with intravenous injection of echo contrast  (Definity). No left ventricular thrombus.  2. There is decreased diastolic filling of the right  ventricle.  3. Moderate to large anterior pericardial effusion lateral  to the right ventricle.   The pericardial effusion measures  0.8 cm adjacent to the right ventricle as seen in the  subcostal view.  Fibrinous material is seen within the  pericardial space.  There is greater than 40% transmitral  respirophasic flow varation which is sensitive to but not  specific flow pericardial tamponade.  4. Left pleural effusion.  Overall findings constent with elevated pericardial  pressure and early cardiac tamponade.  Case discussed with Dr. Ku  ------------------------------------------------------------------------  Confirmed on  2018 - 09:42:55 by Amol Greenfield M.D.    < end of copied text >

## 2018-05-04 NOTE — CHART NOTE - NSCHARTNOTEFT_GEN_A_CORE
====================  CCU MIDNIGHT ROUNDS  ====================    LUNA [facility]  [id number]  Patient is a 77y old  Male who presents with a chief complaint of hypotension, tachycardia, elevated INR (02 May 2018 17:18)    ====================  SUMMARY:  ====================  76yo M PMH recently diagnosed CAD s/p 2DES (pLAD, D1) on 4/18 and 4/19 c/b LV thrombus now on coumadin, HFpEF (EF 20%) who was sent home with life vest now presenting from INR clinic with elevated INR 7.3, hypotension (SBP 80s) and tachycardia (110s). admitted to [facility] , transferred to CCU2 for further monitoring     ====================  NEW EVENTS:  ====================  Slightly tachycardic but with stable BP.  No complaint.    MEDICATIONS  (STANDING):  aspirin  chewable 81 milliGRAM(s) Oral daily  atorvastatin 80 milliGRAM(s) Oral at bedtime  clopidogrel Tablet 75 milliGRAM(s) Oral daily  lisinopril 2.5 milliGRAM(s) Oral daily  metoprolol succinate ER 25 milliGRAM(s) Oral daily    MEDICATIONS  (PRN):    ====================  VITALS (Last 12 hrs):  ====================    T(C): 37.2 (05-04-18 @ 19:00), Max: 37.6 (05-04-18 @ 16:00)  T(F): 99 (05-04-18 @ 19:00), Max: 99.6 (05-04-18 @ 16:00)  HR: 108 (05-04-18 @ 22:00) (94 - 110)  BP: 103/77 (05-04-18 @ 22:00) (93/63 - 122/82)  BP(mean): 85 (05-04-18 @ 22:00) (73 - 96)  ABP: --  ABP(mean): --  RR: 18 (05-04-18 @ 21:00) (13 - 21)  SpO2: 97% (05-04-18 @ 22:00) (94% - 98%)  Wt(kg): --  CVP(mm Hg): --  CVP(cm H2O): --  CO: --  CI: --  PA: --  PA(mean): --  PCWP: --  SVR: --  PVR: --    I&O's Summary    03 May 2018 07:01  -  04 May 2018 07:00  --------------------------------------------------------  IN: 360 mL / OUT: 100 mL / NET: 260 mL    04 May 2018 07:01  -  04 May 2018 22:29  --------------------------------------------------------  IN: 920 mL / OUT: 850 mL / NET: 70 mL    ====================  NEW LABS:  ====================    05-04    133<L>  |  99  |  17  ----------------------------<  129<H>  4.2   |  22  |  0.90    Ca    8.4      04 May 2018 04:55  Phos  2.5     05-04  Mg     2.0     05-04    PT/INR - ( 04 May 2018 19:02 )   PT: 37.2 sec;   INR: 3.33 ratio      PTT - ( 04 May 2018 07:29 )  PTT:56.3 sec  Creatine Kinase, Serum: 36 U/L (05-04-18 @ 04:55)  Troponin T, Serum: 0.24 ng/mL <H> (05-04-18 @ 04:55)    CKMB Units: 1.1 ng/mL (05-04 @ 04:55)  ====================  PLAN:  ====================  -       Raysa ADAMU NP  Beeper #[id number]  Spectra # [phone] ====================  CCU MIDNIGHT ROUNDS  ====================    [facility]  [id number]  Patient is a 77y old  Male who presents with a chief complaint of hypotension, tachycardia, elevated INR (02 May 2018 17:18)    ====================  SUMMARY:  ====================  76yo M PMH recently diagnosed CAD s/p 2DES (pLAD, D1) on 4/18 and 4/19 c/b LV thrombus now on coumadin, HFpEF (EF 20%) who was sent home with life vest now presenting from INR clinic with elevated INR 7.3, hypotension (SBP 80s) and tachycardia (110s). admitted to [facility] , transferred to CCU2 for further monitoring     ====================  NEW EVENTS:  ====================  Slightly tachycardic but with stable BP.  No complaint.    MEDICATIONS  (STANDING):  aspirin  chewable 81 milliGRAM(s) Oral daily  atorvastatin 80 milliGRAM(s) Oral at bedtime  clopidogrel Tablet 75 milliGRAM(s) Oral daily  lisinopril 2.5 milliGRAM(s) Oral daily  metoprolol succinate ER 25 milliGRAM(s) Oral daily    MEDICATIONS  (PRN):    ====================  VITALS (Last 12 hrs):  ====================    T(C): 37.2 (05-04-18 @ 19:00), Max: 37.6 (05-04-18 @ 16:00)  T(F): 99 (05-04-18 @ 19:00), Max: 99.6 (05-04-18 @ 16:00)  HR: 108 (05-04-18 @ 22:00) (94 - 110)  BP: 103/77 (05-04-18 @ 22:00) (93/63 - 122/82)  BP(mean): 85 (05-04-18 @ 22:00) (73 - 96)  ABP: --  ABP(mean): --  RR: 18 (05-04-18 @ 21:00) (13 - 21)  SpO2: 97% (05-04-18 @ 22:00) (94% - 98%)  Wt(kg): --  CVP(mm Hg): --  CVP(cm H2O): --  CO: --  CI: --  PA: --  PA(mean): --  PCWP: --  SVR: --  PVR: --    I&O's Summary    03 May 2018 07:01  -  04 May 2018 07:00  --------------------------------------------------------  IN: 360 mL / OUT: 100 mL / NET: 260 mL    04 May 2018 07:01  -  04 May 2018 22:29  --------------------------------------------------------  IN: 920 mL / OUT: 850 mL / NET: 70 mL    ====================  NEW LABS:  ====================    05-04    133<L>  |  99  |  17  ----------------------------<  129<H>  4.2   |  22  |  0.90    Ca    8.4      04 May 2018 04:55  Phos  2.5     05-04  Mg     2.0     05-04    PT/INR - ( 04 May 2018 19:02 )   PT: 37.2 sec;   INR: 3.33 ratio      PTT - ( 04 May 2018 07:29 )  PTT:56.3 sec  Creatine Kinase, Serum: 36 U/L (05-04-18 @ 04:55)  Troponin T, Serum: 0.24 ng/mL <H> (05-04-18 @ 04:55)    CKMB Units: 1.1 ng/mL (05-04 @ 04:55)  ====================  PLAN:  ====================  - Patient with CAD s/p recent PCI with KYRA pLAD and D1 c/w LV thrombus.  Was on coumadin and presented with pericardial effusion with tamponade physiology in setting of supratherapeutic INR  - Hold AC  - Monitor for hemodynamic instability  - Monitor I & O's  - TTE in am to follow up pericardial effusion  - Hold ACEI for now  - Hold BB for now  - Continue DAPT and statin    Raysa Prado CCU NP  Beeper #[id number]  Spectra # [phone]

## 2018-05-04 NOTE — CHART NOTE - NSCHARTNOTEFT_GEN_A_CORE
ACCEPT NOTE    Admission date: 5/2/18  Chief complaint/ Diagnosis: Pericardial effusion  HPI: 76yo M PMH recently diagnosed CAD s/p 2DES (pLAD, D1) on 4/18 and 4/19 c/b LV thrombus now on coumadin, HFpEF (EF 20%) who was sent home with life vest now presenting from INR clinic with elevated INR 7.3, hypotension (SBP 80s) and tachycardia (110s). admitted to [facility] , transferred to CCU2 for further monitoring     Interval history: Uneventful overnight    REVIEW OF SYSTEMS  Denies CP, Palpitation, SOB, Dyspnea[ x ] All other systems negative    MEDICATIONS  (STANDING)  aspirin  chewable 81 milliGRAM(s) Oral daily  atorvastatin 80 milliGRAM(s) Oral at bedtime  clopidogrel Tablet 75 milliGRAM(s) Oral daily  furosemide    Tablet 20 milliGRAM(s) Oral daily  lisinopril 2.5 milliGRAM(s) Oral daily  metoprolol succinate ER 25 milliGRAM(s) Oral daily    Allergy: No Known Allergies  Vital Signs Last 24 Hrs  T(C): 37.5 (Max: 37.5)  HR: 99 (94 - 114)  BP: 90/60's  RR: 20 (16 - 21)  SpO2: 96% (95% - 97%) in room air     I&O's Summary  IN: 500 mL / OUT: 300 mL / NET: 200 mL    PHYSICAL EXAM  Appearance: Normal, NAD  HEAD:  Atraumatic, Normocephalic  NECK: Supple, No JVD  CHEST/LUNG: Clear to auscultation bilaterally; No wheezing  HEART: Normal S1, S2. No murmurs, rubs, or gallops  ABDOMEN: + Bowel sounds, Soft, NT, ND   EXTREMITIES:  2+ Peripheral Pulses, No clubbing, cyanosis, or edema  NEUROLOGY: non-focal, aaox3  SKIN: No rashes or lesions    Interpretation of Telemetry: NSR                   11.0<11.9   12.35 )-----------( 491                   32.9     133<136  |  99  |  17  -------------------------<  129<H>  4.2   |  22  |  0.90    Ca    8.4      Phos  2.5     Mg     2.0     CARDIAC MARKERS ( 04 May 2018 04:55 )x     / 0.24 ng/mL / 36 U/L / x     / 1.1 ng/mL      Transthoracic Echocardiogram (05.04.18)  EF 15%  1. Severe segmental left ventricular systolic dysfunction. The inferolateral wall is severely hypokinetic/akinetic. Large apical aneurysm.  The mid to apical septum and anterior wall are akintic.  Endocardial visualization enhanced with intravenous injection of echo contrast (Definity). No left ventricular thrombus. 2. There is decreased diastolic filling of the right ventricle.  3. Moderate to large anterior pericardial effusion lateral to the right ventricle.   The pericardial effusion measures 0.8 cm adjacent to the right ventricle as seen in the subcostal view.  Fibrinous material is seen within the pericardial space.  There is greater than 40% transmitral respirophasic flow varation which is sensitive to but not specific flow pericardial tamponade. 4. Left pleural effusion.    A/P: 76yo M PMH recently diagnosed CAD s/p 2DES (pLAD, D1) on 4/18 and 4/19 c/b LV thrombus now on coumadin, HFpEF (EF 20%) who was sent home with life vest now presenting from INR w/  elevated INR 7.3, hypotension and tachycardia , admitted to [facility] , transferred to CCU2 for further monitoring.   CAD (s/p KYRA) remains stable and CP free  Cont. ASA, Plavix, Lipitor, Lisinopril and Metoprolol  HFpEF: Lasix 20 PO daily  Monitor BUN/Cr, U/O and lytes  Daily standing weight    Supratherapeutic INR: S/P VK  Monitor PT/INR  Hold AC                    CAPILLARY BLOOD GLUCOSE ACCEPT NOTE    Admission date: 5/2/18  Chief complaint/ Diagnosis: Pericardial effusion  HPI: 76yo M PMH recently diagnosed CAD s/p 2DES (pLAD, D1) on 4/18 and 4/19 c/b LV thrombus now on coumadin, HFpEF (EF 20%) who was sent home with life vest now presenting from INR clinic with elevated INR 7.3, hypotension (SBP 80s) and tachycardia (110s). admitted to [facility] , transferred to CCU2 for further monitoring     Interval history: Uneventful overnight    REVIEW OF SYSTEMS  Denies CP, Palpitation, SOB, Dyspnea[ x ] All other systems negative    MEDICATIONS  (STANDING)  aspirin  chewable 81 milliGRAM(s) Oral daily  atorvastatin 80 milliGRAM(s) Oral at bedtime  clopidogrel Tablet 75 milliGRAM(s) Oral daily  furosemide    Tablet 20 milliGRAM(s) Oral daily  lisinopril 2.5 milliGRAM(s) Oral daily  metoprolol succinate ER 25 milliGRAM(s) Oral daily    Allergy: No Known Allergies  Vital Signs Last 24 Hrs  T(C): 37.5 (Max: 37.5)  HR: 99 (94 - 114)  BP: 90/60's  RR: 20 (16 - 21)  SpO2: 96% (95% - 97%) in room air     I&O's Summary  IN: 500 mL / OUT: 300 mL / NET: 200 mL    PHYSICAL EXAM  Appearance: Normal, NAD  HEAD:  Atraumatic, Normocephalic  NECK: Supple, No JVD  CHEST/LUNG: Clear to auscultation bilaterally; No wheezing  HEART: Normal S1, S2. No murmurs, rubs, or gallops  ABDOMEN: + Bowel sounds, Soft, NT, ND   EXTREMITIES:  2+ Peripheral Pulses, No clubbing, cyanosis, or edema  NEUROLOGY: non-focal, aaox3  SKIN: No rashes or lesions    Interpretation of Telemetry: NSR                   11.0<11.9   12.35 )-----------( 491                   32.9     133<136  |  99  |  17  -------------------------<  129<H>  4.2   |  22  |  0.90    Ca    8.4      Phos  2.5     Mg     2.0     CARDIAC MARKERS ( 04 May 2018 04:55 )x     / 0.24 ng/mL / 36 U/L / x     / 1.1 ng/mL      Transthoracic Echocardiogram (05.04.18)  EF 15%  1. Severe segmental left ventricular systolic dysfunction. The inferolateral wall is severely hypokinetic/akinetic. Large apical aneurysm.  The mid to apical septum and anterior wall are akintic.  Endocardial visualization enhanced with intravenous injection of echo contrast (Definity). No left ventricular thrombus. 2. There is decreased diastolic filling of the right ventricle.  3. Moderate to large anterior pericardial effusion lateral to the right ventricle.   The pericardial effusion measures 0.8 cm adjacent to the right ventricle as seen in the subcostal view.  Fibrinous material is seen within the pericardial space.  There is greater than 40% transmitral respirophasic flow varation which is sensitive to but not specific flow pericardial tamponade. 4. Left pleural effusion.    A/P: 76yo M PMH recently diagnosed CAD s/p 2DES (pLAD, D1) on 4/18 and 4/19 c/b LV thrombus now on coumadin, HFpEF (EF 20%) who was sent home with life vest now presenting from INR w/  elevated INR 7.3, hypotension and tachycardia , admitted to [facility] , transferred to CCU2 for further monitoring.   CAD (s/p KYRA) remains stable and CP free  Cont. ASA, Plavix, Lipitor, Lisinopril and Metoprolol  HFpEF: Hold lasix for now  Monitor BUN/Cr, U/O and lytes  Daily standing weight    Supratherapeutic INR: S/P VK  Monitor PT/INR  Hold AC                    CAPILLARY BLOOD GLUCOSE

## 2018-05-04 NOTE — PROGRESS NOTE ADULT - ASSESSMENT
77 man w/ recent AWSTEMI, ischemic cardiomyopathy (20-25% EF), LV thrombus presenting with suprathereputic INR, and concerns of inability to tolerate PO heart failure medications. He is mildly volume overloaded on exam today at [facility] 8. Perfusion is in tact. He is NYHA Class 3, Stage D.  Found to have pericardial effusion with tamponade physiology today. Clinically stable. This is likely hemorrhagic effusion in setting of supratheraputic INR.   -please hold diuretics   -given IVF bolus, would monitor. Low threshold to give further fluids if hypotensive  -c/w lisinopril 2.5 mg daily  -c/w metoprolol 25 mg XL  -repeat TTE in 48 hours or if clinical picture changes     Please call me at [phone] for any further questions up till 5 pm. For after hours, please call the covering cardiology on call fellow at [phone] for any further assistance.

## 2018-05-05 DIAGNOSIS — I23.6 THROMBOSIS OF ATRIUM, AURICULAR APPENDAGE, AND VENTRICLE AS CURRENT COMPLICATIONS FOLLOWING ACUTE MYOCARDIAL INFARCTION: ICD-10-CM

## 2018-05-05 LAB
ANION GAP SERPL CALC-SCNC: 13 MMOL/L — SIGNIFICANT CHANGE UP (ref 5–17)
APTT BLD: 35.5 SEC — SIGNIFICANT CHANGE UP (ref 27.5–37.4)
APTT BLD: 36.3 SEC — SIGNIFICANT CHANGE UP (ref 27.5–37.4)
BUN SERPL-MCNC: 16 MG/DL — SIGNIFICANT CHANGE UP (ref 7–23)
CALCIUM SERPL-MCNC: 8.4 MG/DL — SIGNIFICANT CHANGE UP (ref 8.4–10.5)
CHLORIDE SERPL-SCNC: 101 MMOL/L — SIGNIFICANT CHANGE UP (ref 96–108)
CO2 SERPL-SCNC: 23 MMOL/L — SIGNIFICANT CHANGE UP (ref 22–31)
CREAT SERPL-MCNC: 0.84 MG/DL — SIGNIFICANT CHANGE UP (ref 0.5–1.3)
GLUCOSE SERPL-MCNC: 117 MG/DL — HIGH (ref 70–99)
HCT VFR BLD CALC: 34.2 % — LOW (ref 39–50)
HCT VFR BLD CALC: 35.8 % — LOW (ref 39–50)
HGB BLD-MCNC: 11.5 G/DL — LOW (ref 13–17)
HGB BLD-MCNC: 11.9 G/DL — LOW (ref 13–17)
INR BLD: 1.96 RATIO — HIGH (ref 0.88–1.16)
MAGNESIUM SERPL-MCNC: 2 MG/DL — SIGNIFICANT CHANGE UP (ref 1.6–2.6)
MCHC RBC-ENTMCNC: 32.2 PG — SIGNIFICANT CHANGE UP (ref 27–34)
MCHC RBC-ENTMCNC: 32.2 PG — SIGNIFICANT CHANGE UP (ref 27–34)
MCHC RBC-ENTMCNC: 33.2 GM/DL — SIGNIFICANT CHANGE UP (ref 32–36)
MCHC RBC-ENTMCNC: 33.5 GM/DL — SIGNIFICANT CHANGE UP (ref 32–36)
MCV RBC AUTO: 96.1 FL — SIGNIFICANT CHANGE UP (ref 80–100)
MCV RBC AUTO: 97.2 FL — SIGNIFICANT CHANGE UP (ref 80–100)
PHOSPHATE SERPL-MCNC: 2.7 MG/DL — SIGNIFICANT CHANGE UP (ref 2.5–4.5)
PLATELET # BLD AUTO: 401 K/UL — HIGH (ref 150–400)
PLATELET # BLD AUTO: 456 K/UL — HIGH (ref 150–400)
POTASSIUM SERPL-MCNC: 3.8 MMOL/L — SIGNIFICANT CHANGE UP (ref 3.5–5.3)
POTASSIUM SERPL-SCNC: 3.8 MMOL/L — SIGNIFICANT CHANGE UP (ref 3.5–5.3)
PROTHROM AB SERPL-ACNC: 21.7 SEC — HIGH (ref 9.8–12.7)
RBC # BLD: 3.56 M/UL — LOW (ref 4.2–5.8)
RBC # BLD: 3.68 M/UL — LOW (ref 4.2–5.8)
RBC # FLD: 11.7 % — SIGNIFICANT CHANGE UP (ref 10.3–14.5)
RBC # FLD: 11.8 % — SIGNIFICANT CHANGE UP (ref 10.3–14.5)
SODIUM SERPL-SCNC: 137 MMOL/L — SIGNIFICANT CHANGE UP (ref 135–145)
WBC # BLD: 8.8 K/UL — SIGNIFICANT CHANGE UP (ref 3.8–10.5)
WBC # BLD: 8.9 K/UL — SIGNIFICANT CHANGE UP (ref 3.8–10.5)
WBC # FLD AUTO: 8.8 K/UL — SIGNIFICANT CHANGE UP (ref 3.8–10.5)
WBC # FLD AUTO: 8.9 K/UL — SIGNIFICANT CHANGE UP (ref 3.8–10.5)

## 2018-05-05 PROCEDURE — 93306 TTE W/DOPPLER COMPLETE: CPT | Mod: 26

## 2018-05-05 PROCEDURE — 99233 SBSQ HOSP IP/OBS HIGH 50: CPT | Mod: GC

## 2018-05-05 PROCEDURE — 93010 ELECTROCARDIOGRAM REPORT: CPT

## 2018-05-05 RX ORDER — METOPROLOL TARTRATE 50 MG
25 TABLET ORAL DAILY
Qty: 0 | Refills: 0 | Status: DISCONTINUED | OUTPATIENT
Start: 2018-05-05 | End: 2018-05-08

## 2018-05-05 RX ORDER — WARFARIN SODIUM 2.5 MG/1
2.5 TABLET ORAL ONCE
Qty: 0 | Refills: 0 | Status: COMPLETED | OUTPATIENT
Start: 2018-05-05 | End: 2018-05-05

## 2018-05-05 RX ORDER — LISINOPRIL 2.5 MG/1
2.5 TABLET ORAL DAILY
Qty: 0 | Refills: 0 | Status: DISCONTINUED | OUTPATIENT
Start: 2018-05-05 | End: 2018-05-08

## 2018-05-05 RX ORDER — POTASSIUM CHLORIDE 20 MEQ
40 PACKET (EA) ORAL ONCE
Qty: 0 | Refills: 0 | Status: COMPLETED | OUTPATIENT
Start: 2018-05-05 | End: 2018-05-05

## 2018-05-05 RX ORDER — HEPARIN SODIUM 5000 [USP'U]/ML
800 INJECTION INTRAVENOUS; SUBCUTANEOUS
Qty: 25000 | Refills: 0 | Status: DISCONTINUED | OUTPATIENT
Start: 2018-05-05 | End: 2018-05-05

## 2018-05-05 RX ORDER — HEPARIN SODIUM 5000 [USP'U]/ML
1000 INJECTION INTRAVENOUS; SUBCUTANEOUS
Qty: 25000 | Refills: 0 | Status: DISCONTINUED | OUTPATIENT
Start: 2018-05-05 | End: 2018-05-06

## 2018-05-05 RX ADMIN — CLOPIDOGREL BISULFATE 75 MILLIGRAM(S): 75 TABLET, FILM COATED ORAL at 12:27

## 2018-05-05 RX ADMIN — WARFARIN SODIUM 2.5 MILLIGRAM(S): 2.5 TABLET ORAL at 21:06

## 2018-05-05 RX ADMIN — ATORVASTATIN CALCIUM 80 MILLIGRAM(S): 80 TABLET, FILM COATED ORAL at 21:06

## 2018-05-05 RX ADMIN — Medication 40 MILLIEQUIVALENT(S): at 06:34

## 2018-05-05 RX ADMIN — Medication 81 MILLIGRAM(S): at 12:27

## 2018-05-05 RX ADMIN — HEPARIN SODIUM 10 UNIT(S)/HR: 5000 INJECTION INTRAVENOUS; SUBCUTANEOUS at 21:06

## 2018-05-05 RX ADMIN — HEPARIN SODIUM 8 UNIT(S)/HR: 5000 INJECTION INTRAVENOUS; SUBCUTANEOUS at 14:06

## 2018-05-05 NOTE — CHART NOTE - NSCHARTNOTEFT_GEN_A_CORE
====================  CCU MIDNIGHT ROUNDS  ====================    SVETLANAANA M [facility]  [id number]  Patient is a 77y old  Male who presents with a chief complaint of hypotension, tachycardia, elevated INR (02 May 2018 17:18)    ====================  SUMMARY:  ====================  76yo M PMH recently diagnosed CAD s/p 2DES (pLAD, D1) on 4/18 and 4/19 c/b LV thrombus now on Coumadin, HFrEF (EF 20%) who was sent home with life vest now presenting from INR clinic with elevated INR 7.3, hypotension (SBP 80s) and tachycardia (110s). s/p Vitamin K    ====================  NEW EVENTS:  ====================    MEDICATIONS  (STANDING):  aspirin  chewable 81 milliGRAM(s) Oral daily  atorvastatin 80 milliGRAM(s) Oral at bedtime  clopidogrel Tablet 75 milliGRAM(s) Oral daily  heparin  Infusion 1000 Unit(s)/Hr (10 mL/Hr) IV Continuous <Continuous>  lisinopril 2.5 milliGRAM(s) Oral daily  metoprolol succinate ER 25 milliGRAM(s) Oral daily  warfarin 2.5 milliGRAM(s) Oral once    MEDICATIONS  (PRN):    ====================  VITALS (Last 12 hrs):  ====================    T(C): 37.6 (05-05-18 @ 19:00), Max: 37.6 (05-05-18 @ 19:00)  T(F): 99.7 (05-05-18 @ 19:00), Max: 99.7 (05-05-18 @ 19:00)  HR: 108 (05-05-18 @ 20:00) (104 - 116)  BP: 106/76 (05-05-18 @ 20:00) (90/67 - 117/86)  BP(mean): 86 (05-05-18 @ 20:00) (75 - 97)  ABP: --  ABP(mean): --  RR: 16 (05-05-18 @ 20:00) (14 - 16)  SpO2: 97% (05-05-18 @ 20:00) (96% - 97%)  Wt(kg): --  CVP(mm Hg): --  CVP(cm H2O): --  CO: --  CI: --  PA: --  PA(mean): --  PCWP: --  SVR: --  PVR: --    I&O's Summary    04 May 2018 07:01  -  05 May 2018 07:00  --------------------------------------------------------  IN: 1260 mL / OUT: 1500 mL / NET: -240 mL    05 May 2018 07:01  -  05 May 2018 21:00  --------------------------------------------------------  IN: 536 mL / OUT: 450 mL / NET: 86 mL    ====================  NEW LABS:  ====================    05-05    137  |  101  |  16  ----------------------------<  117<H>  3.8   |  23  |  0.84    Ca    8.4      05 May 2018 05:47  Phos  2.7     05-05  Mg     2.0     05-05    PT/INR - ( 05 May 2018 05:47 )   PT: 21.7 sec;   INR: 1.96 ratio      PTT - ( 05 May 2018 20:20 )  PTT:36.3 sec  ====================  PLAN:  ====================  -       Raysa ADAMU NP  Beeper #[id number]  Spectra # [phone] ====================  CCU MIDNIGHT ROUNDS  ====================    [facility]  [id number]  Patient is a 77y old  Male who presents with a chief complaint of hypotension, tachycardia, elevated INR (02 May 2018 17:18)    ====================  SUMMARY:  ====================  78yo M PMH recently diagnosed CAD s/p 2DES (pLAD, D1) on 4/18 and 4/19 c/b LV thrombus now on Coumadin, HFrEF (EF 20%) who was sent home with life vest now presenting from INR clinic with elevated INR 7.3, hypotension (SBP 80s) and tachycardia (110s). s/p Vitamin K    ====================  NEW EVENTS:  ====================  No events/complaints.  Tolerating AC.    MEDICATIONS  (STANDING):  aspirin  chewable 81 milliGRAM(s) Oral daily  atorvastatin 80 milliGRAM(s) Oral at bedtime  clopidogrel Tablet 75 milliGRAM(s) Oral daily  heparin  Infusion 1000 Unit(s)/Hr (10 mL/Hr) IV Continuous <Continuous>  lisinopril 2.5 milliGRAM(s) Oral daily  metoprolol succinate ER 25 milliGRAM(s) Oral daily  warfarin 2.5 milliGRAM(s) Oral once    MEDICATIONS  (PRN):    ====================  VITALS (Last 12 hrs):  ====================    T(C): 37.6 (05-05-18 @ 19:00), Max: 37.6 (05-05-18 @ 19:00)  T(F): 99.7 (05-05-18 @ 19:00), Max: 99.7 (05-05-18 @ 19:00)  HR: 108 (05-05-18 @ 20:00) (104 - 116)  BP: 106/76 (05-05-18 @ 20:00) (90/67 - 117/86)  BP(mean): 86 (05-05-18 @ 20:00) (75 - 97)  ABP: --  ABP(mean): --  RR: 16 (05-05-18 @ 20:00) (14 - 16)  SpO2: 97% (05-05-18 @ 20:00) (96% - 97%)  Wt(kg): --  CVP(mm Hg): --  CVP(cm H2O): --  CO: --  CI: --  PA: --  PA(mean): --  PCWP: --  SVR: --  PVR: --    I&O's Summary    04 May 2018 07:01  -  05 May 2018 07:00  --------------------------------------------------------  IN: 1260 mL / OUT: 1500 mL / NET: -240 mL    05 May 2018 07:01  -  05 May 2018 21:00  --------------------------------------------------------  IN: 536 mL / OUT: 450 mL / NET: 86 mL    ====================  NEW LABS:  ====================    05-05    137  |  101  |  16  ----------------------------<  117<H>  3.8   |  23  |  0.84    Ca    8.4      05 May 2018 05:47  Phos  2.7     05-05  Mg     2.0     05-05    PT/INR - ( 05 May 2018 05:47 )   PT: 21.7 sec;   INR: 1.96 ratio      PTT - ( 05 May 2018 20:20 )  PTT:36.3 sec  ====================  PLAN:  ====================  - Continue to monitor for hemodynamic instability  - TTE showed severe segmental LVSD, moderate to large pericardial effusion adjacent to the RA-RV junction small pericardial effusion superior to the RA and adjacent to the RV  - Continue Heparin drip with bridge to Coumadin.  Given coumadin 2.5 mg tonight  - Monitor coags and signs of bleeding  - Continue ASA, Plavix, and statin  - Continue BB and ACEI  - Ambulate as tolerated    Raysa Prado CCU NP  Beeper #[id number]  Spectra # [phone] ====================  CCU MIDNIGHT ROUNDS  ====================    LUNA [facility]  [id number]  Patient is a 77y old  Male who presents with a chief complaint of hypotension, tachycardia, elevated INR (02 May 2018 17:18)    ====================  SUMMARY:  ====================  78yo M PMH recently diagnosed CAD s/p 2DES (pLAD, D1) on 4/18 and 4/19 c/b LV thrombus now on Coumadin, HFrEF (EF 20%) who was sent home with life vest now presenting from INR clinic with elevated INR 7.3, hypotension (SBP 80s) and tachycardia (110s). s/p Vitamin K    ====================  NEW EVENTS:  ====================  No events/complaints.  Tolerating AC.    MEDICATIONS  (STANDING):  aspirin  chewable 81 milliGRAM(s) Oral daily  atorvastatin 80 milliGRAM(s) Oral at bedtime  clopidogrel Tablet 75 milliGRAM(s) Oral daily  heparin  Infusion 1000 Unit(s)/Hr (10 mL/Hr) IV Continuous <Continuous>  lisinopril 2.5 milliGRAM(s) Oral daily  metoprolol succinate ER 25 milliGRAM(s) Oral daily  warfarin 2.5 milliGRAM(s) Oral once    MEDICATIONS  (PRN):    ====================  VITALS (Last 12 hrs):  ====================    T(C): 37.6 (05-05-18 @ 19:00), Max: 37.6 (05-05-18 @ 19:00)  T(F): 99.7 (05-05-18 @ 19:00), Max: 99.7 (05-05-18 @ 19:00)  HR: 108 (05-05-18 @ 20:00) (104 - 116)  BP: 106/76 (05-05-18 @ 20:00) (90/67 - 117/86)  BP(mean): 86 (05-05-18 @ 20:00) (75 - 97)  ABP: --  ABP(mean): --  RR: 16 (05-05-18 @ 20:00) (14 - 16)  SpO2: 97% (05-05-18 @ 20:00) (96% - 97%)  Wt(kg): --  CVP(mm Hg): --  CVP(cm H2O): --  CO: --  CI: --  PA: --  PA(mean): --  PCWP: --  SVR: --  PVR: --    I&O's Summary    04 May 2018 07:01  -  05 May 2018 07:00  --------------------------------------------------------  IN: 1260 mL / OUT: 1500 mL / NET: -240 mL    05 May 2018 07:01  -  05 May 2018 21:00  --------------------------------------------------------  IN: 536 mL / OUT: 450 mL / NET: 86 mL    ====================  NEW LABS:  ====================    05-05    137  |  101  |  16  ----------------------------<  117<H>  3.8   |  23  |  0.84    Ca    8.4      05 May 2018 05:47  Phos  2.7     05-05  Mg     2.0     05-05    PT/INR - ( 05 May 2018 05:47 )   PT: 21.7 sec;   INR: 1.96 ratio      PTT - ( 05 May 2018 20:20 )  PTT:36.3 sec  ====================  PLAN:  ====================  - Continue to monitor for hemodynamic instability  - TTE showed severe segmental LVSD, moderate to large pericardial effusion adjacent to the RA-RV junction small pericardial effusion superior to the RA and adjacent to the RV  - Continue Heparin drip with bridge to Coumadin for LV thrombus.  Given Coumadin 2.5 mg tonight  - Monitor coags and signs of bleeding  - Continue ASA, Plavix, and statin  - Continue BB and ACEI  - Hold Lasix for now  - Follow HF recs  - Ambulate as tolerated  - Repeat TTE on Monday    Raysa Prado CCU NP  Beeper #[id number]  Spectra # [phone]

## 2018-05-05 NOTE — PROGRESS NOTE ADULT - ASSESSMENT
77 man w/ recent AWSTEMI, ischemic cardiomyopathy (20-25% EF), LV thrombus presenting with suprathereputic INR, and concerns of inability to tolerate PO heart failure medications. He is mildly volume overloaded on exam today at [facility] 8. Perfusion is in tact. He is NYHA Class 3, Stage D.  Found to have pericardial effusion with tamponade physiology today. Clinically stable. This is likely hemorrhagic effusion in setting of supratheraputic INR.   - continue to hold diuretics   -No further IVF bolus at current, would monitor. Low threshold to give further fluids if hypotensive  -c/w lisinopril 2.5 mg daily  -c/w metoprolol 25 mg XL  -repeat TTE in 48 hours or if clinical picture changes   - continue to hold AC given no visualized thrombus and possible fibrinous stranding in effusion

## 2018-05-05 NOTE — PROGRESS NOTE ADULT - PROBLEM SELECTOR PLAN 2
remains hemodynamically stable  Repeat echo am remains hemodynamically stable  no clinical evidence of pericardial tamponade  Repeat echo am

## 2018-05-05 NOTE — PROGRESS NOTE ADULT - SUBJECTIVE AND OBJECTIVE BOX
Interval Events:    Feels well, no dyspnea or edema.    ROS: Denies HA/dizziness/CP/SOB/PND/orthopnea/LE edema/abd pain    MEDICATIONS:  aspirin  chewable 81 milliGRAM(s) Oral daily  clopidogrel Tablet 75 milliGRAM(s) Oral daily    atorvastatin 80 milliGRAM(s) Oral at bedtime        PHYSICAL EXAM:  T(C): 36.9 (05-05-18 @ 11:00), Max: 37.6 (05-04-18 @ 16:00)  HR: 111 (05-05-18 @ 11:00) (98 - 116)  BP: 92/70 (05-05-18 @ 11:00) (88/56 - 122/82)  RR: 16 (05-05-18 @ 11:00) (13 - 21)  SpO2: 94% (05-05-18 @ 09:00) (93% - 98%)  Wt(kg): --  I&O's Summary    04 May 2018 07:01  -  05 May 2018 07:00  --------------------------------------------------------  IN: 1260 mL / OUT: 1500 mL / NET: -240 mL    05 May 2018 07:01  -  05 May 2018 12:15  --------------------------------------------------------  IN: 120 mL / OUT: 200 mL / NET: -80 mL        Appearance: No Acute Distress  HEENT: JVP 5cm  Cardiovascular: Normal S1 S2, RRR, No murmurs/rubs/gallops  Respiratory: Clear to auscultation bilaterally  Gastrointestinal: Soft, Non-tender  Extremities: No clubbing, cyanosis or edema  Psychiatry: A & O x 3, Mood & affect appropriate    LABS:	 	    CBC Full  -  ( 05 May 2018 05:47 )  WBC Count : 8.9 K/uL  Hemoglobin : 11.9 g/dL  Hematocrit : 35.8 %  Platelet Count - Automated : 401 K/uL  Mean Cell Volume : 97.2 fl  Mean Cell Hemoglobin : 32.2 pg  Mean Cell Hemoglobin Concentration : 33.2 gm/dL  Auto Neutrophil # : x  Auto Lymphocyte # : x  Auto Monocyte # : x  Auto Eosinophil # : x  Auto Basophil # : x  Auto Neutrophil % : x  Auto Lymphocyte % : x  Auto Monocyte % : x  Auto Eosinophil % : x  Auto Basophil % : x    05-05    137  |  101  |  16  ----------------------------<  117<H>  3.8   |  23  |  0.84  05-04    133<L>  |  99  |  17  ----------------------------<  129<H>  4.2   |  22  |  0.90    Ca    8.4      05 May 2018 05:47  Ca    8.4      04 May 2018 04:55  Phos  2.7     05-05  Phos  2.5     05-04  Mg     2.0     05-05  Mg     2.0     05-04        CARDIAC MARKERS:            TELEMETRY:     DIAGNOSTIC TESTING:

## 2018-05-05 NOTE — PROGRESS NOTE ADULT - SUBJECTIVE AND OBJECTIVE BOX
CCU2/PICU NOTE    Admission date: 5/4/18  Chief complaint/ Diagnosis: Pericardial effusion   HPI: 76yo M PMH recently diagnosed CAD s/p 2DES (pLAD, D1) on 4/18 and 4/19 c/b LV thrombus now on coumadin, HFpEF (EF 20%) who was sent home with life vest now presenting from INR clinic with elevated INR 7.3, hypotension (SBP 80s) and tachycardia (110s).     Interval history: Uneventful overnight  s/p PO VK 5 x 1   INR 1.96  H/H stable    REVIEW OF SYSTEMS  Denies CP, Palpitation, SOB, Dyspnea [ x] All other systems negative    MEDICATIONS  (STANDING)  aspirin  chewable 81 milliGRAM(s) Oral daily  atorvastatin 80 milliGRAM(s) Oral at bedtime  clopidogrel Tablet 75 milliGRAM(s) Oral daily    Allergy: No Known Allergies    ICU Vital Signs Last 24 Hrs  T(C): 36.6 (Max: 37.6 )  HR: 105 (94 - 110)  BP: 97/70 (88/56 - 122/82)  RR: 18 (13 - 21)  SpO2: 94% (93% - 98%) in room air     I&O's Summary  IN: 1260 mL / OUT: 1500 mL / NET: -240 mL    PHYSICAL EXAM  Appearance: Normal, NAD  HEAD:  Atraumatic, Normocephalic  NECK: Supple, No JVD  CHEST/LUNG: Clear to auscultation bilaterally; No wheezing  HEART: Normal S1, S2. No murmurs, rubs, or gallops  ABDOMEN: + Bowel sounds, Soft, NT, ND   EXTREMITIES:  2+ Peripheral Pulses, No clubbing, cyanosis, or edema  NEUROLOGY: non-focal, aaox3  SKIN: No rashes or lesions    Interpretation of Telemetry: NSR                  11.9   8.9   )-----------( 401                  35.8     137  |  101  |  16  -----------------------<  117<H>  3.8   |  23  |  0.84    Ca    8.4      Phos  2.7     Mg     2.0

## 2018-05-05 NOTE — PROGRESS NOTE ADULT - ASSESSMENT
78yo M PMH recently diagnosed CAD s/p 2DES (pLAD, D1) on 4/18 and 4/19 c/b LV thrombus now on coumadin, HFpEF (EF 20%) who was sent home with life vest now presenting from INR clinic with elevated INR 7.3, hypotension (SBP 80s) and tachycardia (110s).

## 2018-05-06 LAB
ANION GAP SERPL CALC-SCNC: 12 MMOL/L — SIGNIFICANT CHANGE UP (ref 5–17)
APTT BLD: 53.5 SEC — HIGH (ref 27.5–37.4)
APTT BLD: 65.2 SEC — HIGH (ref 27.5–37.4)
APTT BLD: 66.6 SEC — HIGH (ref 27.5–37.4)
BLD GP AB SCN SERPL QL: NEGATIVE — SIGNIFICANT CHANGE UP
BUN SERPL-MCNC: 15 MG/DL — SIGNIFICANT CHANGE UP (ref 7–23)
CALCIUM SERPL-MCNC: 8.2 MG/DL — LOW (ref 8.4–10.5)
CHLORIDE SERPL-SCNC: 99 MMOL/L — SIGNIFICANT CHANGE UP (ref 96–108)
CO2 SERPL-SCNC: 22 MMOL/L — SIGNIFICANT CHANGE UP (ref 22–31)
CREAT SERPL-MCNC: 0.79 MG/DL — SIGNIFICANT CHANGE UP (ref 0.5–1.3)
GLUCOSE SERPL-MCNC: 119 MG/DL — HIGH (ref 70–99)
HCT VFR BLD CALC: 32.9 % — LOW (ref 39–50)
HGB BLD-MCNC: 11.1 G/DL — LOW (ref 13–17)
INR BLD: 1.56 RATIO — HIGH (ref 0.88–1.16)
MAGNESIUM SERPL-MCNC: 2 MG/DL — SIGNIFICANT CHANGE UP (ref 1.6–2.6)
MCHC RBC-ENTMCNC: 32.4 PG — SIGNIFICANT CHANGE UP (ref 27–34)
MCHC RBC-ENTMCNC: 33.8 GM/DL — SIGNIFICANT CHANGE UP (ref 32–36)
MCV RBC AUTO: 95.9 FL — SIGNIFICANT CHANGE UP (ref 80–100)
PHOSPHATE SERPL-MCNC: 2.5 MG/DL — SIGNIFICANT CHANGE UP (ref 2.5–4.5)
PLATELET # BLD AUTO: 439 K/UL — HIGH (ref 150–400)
POTASSIUM SERPL-MCNC: 3.9 MMOL/L — SIGNIFICANT CHANGE UP (ref 3.5–5.3)
POTASSIUM SERPL-SCNC: 3.9 MMOL/L — SIGNIFICANT CHANGE UP (ref 3.5–5.3)
PROTHROM AB SERPL-ACNC: 17 SEC — HIGH (ref 9.8–12.7)
RBC # BLD: 3.43 M/UL — LOW (ref 4.2–5.8)
RBC # FLD: 11.7 % — SIGNIFICANT CHANGE UP (ref 10.3–14.5)
RH IG SCN BLD-IMP: POSITIVE — SIGNIFICANT CHANGE UP
RH IG SCN BLD-IMP: POSITIVE — SIGNIFICANT CHANGE UP
SODIUM SERPL-SCNC: 133 MMOL/L — LOW (ref 135–145)
WBC # BLD: 8.4 K/UL — SIGNIFICANT CHANGE UP (ref 3.8–10.5)
WBC # FLD AUTO: 8.4 K/UL — SIGNIFICANT CHANGE UP (ref 3.8–10.5)

## 2018-05-06 PROCEDURE — 93010 ELECTROCARDIOGRAM REPORT: CPT

## 2018-05-06 PROCEDURE — 99233 SBSQ HOSP IP/OBS HIGH 50: CPT | Mod: GC

## 2018-05-06 RX ORDER — WARFARIN SODIUM 2.5 MG/1
2.5 TABLET ORAL ONCE
Qty: 0 | Refills: 0 | Status: COMPLETED | OUTPATIENT
Start: 2018-05-06 | End: 2018-05-06

## 2018-05-06 RX ORDER — HEPARIN SODIUM 5000 [USP'U]/ML
1050 INJECTION INTRAVENOUS; SUBCUTANEOUS
Qty: 25000 | Refills: 0 | Status: DISCONTINUED | OUTPATIENT
Start: 2018-05-06 | End: 2018-05-07

## 2018-05-06 RX ORDER — POTASSIUM CHLORIDE 20 MEQ
20 PACKET (EA) ORAL ONCE
Qty: 0 | Refills: 0 | Status: COMPLETED | OUTPATIENT
Start: 2018-05-06 | End: 2018-05-06

## 2018-05-06 RX ADMIN — ATORVASTATIN CALCIUM 80 MILLIGRAM(S): 80 TABLET, FILM COATED ORAL at 21:33

## 2018-05-06 RX ADMIN — WARFARIN SODIUM 2.5 MILLIGRAM(S): 2.5 TABLET ORAL at 21:33

## 2018-05-06 RX ADMIN — Medication 81 MILLIGRAM(S): at 12:49

## 2018-05-06 RX ADMIN — CLOPIDOGREL BISULFATE 75 MILLIGRAM(S): 75 TABLET, FILM COATED ORAL at 12:49

## 2018-05-06 RX ADMIN — HEPARIN SODIUM 10.5 UNIT(S)/HR: 5000 INJECTION INTRAVENOUS; SUBCUTANEOUS at 04:23

## 2018-05-06 RX ADMIN — LISINOPRIL 2.5 MILLIGRAM(S): 2.5 TABLET ORAL at 06:12

## 2018-05-06 RX ADMIN — Medication 20 MILLIEQUIVALENT(S): at 05:09

## 2018-05-06 RX ADMIN — Medication 25 MILLIGRAM(S): at 05:07

## 2018-05-06 NOTE — DIETITIAN INITIAL EVALUATION ADULT. - SIGNS/SYMPTOMS
Pt c reports of suboptimal appetite and intake Pt c questions regarding Coumadin and need for vitamin K intake consistency

## 2018-05-06 NOTE — DIETITIAN INITIAL EVALUATION ADULT. - PROBLEM SELECTOR PLAN 6
DVT PPX: Supratherapeutic INR  DIET: Vegetarian/DASH    Dispo: Admit to medicine for w/u of dizziness and titration of HF meds

## 2018-05-06 NOTE — DIETITIAN INITIAL EVALUATION ADULT. - NS AS NUTRI INTERV ED CONTENT3
Reinforced Coumadin (need for consistency c vitamin K intake and how to adjust diet in order to do so) and Heart Failure education.

## 2018-05-06 NOTE — PROVIDER CONTACT NOTE (OTHER) - RECOMMENDATIONS
keep heparin gtt at 1050units/hr=10.5cchr, next PTT in AM tomorrow
Tylenol 650 PO and reassess.
consider keep heparin gtt at current rate at 1050units/hr=10.5cc/hr, PTT in 6 hours

## 2018-05-06 NOTE — DIETITIAN INITIAL EVALUATION ADULT. - OTHER INFO
Pt seen for consult for "coumadin diet teaching." Pt reports he does like to eat many leafy greens and asked questions about how to incorporate into diet, noted Pt did receive Coumadin and Heart Failure diet education during last admission, reinforced education and answered all questions. Pt reports NKFA. States no micronutrient coverage at home. Pt reports during last admission he thought he was about 145 pounds but wasn't fully sure, noted admit wt of about 149 pounds and wt over the last few weeks has been about 138-140 pounds, questionable if slight decline in wt due to fluid shifts, overall Pt is well nourished. States he did get a new scale for home but was not using it since he was very dizzy and was in bed most of the time.

## 2018-05-06 NOTE — DIETITIAN INITIAL EVALUATION ADULT. - PHYSICAL APPEARANCE
well nourished/nutrition focused physical exam conducted with Pt consent, Pt c mild muscle loss around calves likely due to inactivity and being in bed for prolonged period of time, otherwise no other sites of muscle or fat loss ar this time

## 2018-05-06 NOTE — PROGRESS NOTE ADULT - SUBJECTIVE AND OBJECTIVE BOX
Subjective:  Pt reports feeling well . Frustrated by hospital stay and routine   Denies chest pain sob , palpitations  reports slight LH when OOB   Remains  hemodynamically stable     Medications:  aspirin  chewable 81 milliGRAM(s) Oral daily  atorvastatin 80 milliGRAM(s) Oral at bedtime  clopidogrel Tablet 75 milliGRAM(s) Oral daily  heparin  Infusion 1050 Unit(s)/Hr IV Continuous <Continuous>  lisinopril 2.5 milliGRAM(s) Oral daily  metoprolol succinate ER 25 milliGRAM(s) Oral daily  Coumadin 2.5 mg x one     PHYSICAL EXAM:  Vital Signs Last 24 Hrs  T(C): 37.3 (06 May 2018 07:00), Max: 37.6 (05 May 2018 19:00)  T(F): 99.2 (06 May 2018 07:00), Max: 99.7 (05 May 2018 19:00)  HR: 99 (06 May 2018 08:00) (94 - 116)  BP: 97/64 (06 May 2018 08:00) (90/67 - 129/80)  BP(mean): 74 (06 May 2018 08:00) (74 - 97)  RR: 16 (06 May 2018 08:00) (14 - 16)  SpO2: 98% (06 May 2018 08:00) (94% - 100%)  Daily Weight in k  (06 May 2018 06:00)           Weight in kg 62.5 5/5     EKG ST rate 106 Q waves in V 2-v4 T wave down in 1 and  AVL     Telemetry : ST rate 100- 110 few PVCs     I&O's Detail    05 May 2018 07:01  -  06 May 2018 07:00  --------------------------------------------------------  IN:    heparin Infusion: 56 mL    heparin Infusion: 70 mL    heparin Infusion: 31.5 mL    Oral Fluid: 600 mL  Total IN: 757.5 mL    OUT:    Voided: 830 mL  Total OUT: 830 mL    Total NET: -72.5 mL    General: A/ox 3, No acute Distress  Neck: Supple, NO JVD  Cardiac: S1 S2,  RRR   Pulmonary: decreased L>R  CTAB, Breathing unlabored, No Rhonchi/Rales/Wheezing  Abdomen: Soft, Non -tender, +BS   Extremities: No Rashes, No edema  Neuro: A/o x 3, No focal deficits  Psch: normal mood , normal affect                          11.1   8.4   )-----------( 439      ( 06 May 2018 03:31 )             32.9     05-06    133<L>  |  99  |  15  ----------------------------<  119<H>  3.9   |  22  |  0.79    Ca    8.2<L>      06 May 2018 03:31  Phos  2.5     05-06  Mg     2.0     05-06      PT/INR - ( 06 May 2018 03:31 )   PT: 17.0 sec;   INR: 1.56 ratio                                                                        INR: 1.96:  (18                                                                      INR: 3.33:   (0504.18  PTT - ( 06 May 2018 03:31 )  PTT:53.5 sec      Transthoracic Echocardiogram (18 @ 08:26) >  Conclusions:  1. Severe segmental left ventricular systolic dysfunction.  2. Moderate to large pericardial effusion seen adjacent to  the right atrium- right ventricle junction in the subcostal  view. Small pericardial effusion superior to the right  atrium and adjacent to the right ventricle in the subcostal  view. Impaired RV diastolic filling. Fibrinous material is  seen within the effusion.  3. Left pleural effusion.

## 2018-05-06 NOTE — DIETITIAN INITIAL EVALUATION ADULT. - DIET TYPE
1500ml/ovo-vegetarian, Ensure Enlive x 2 two times daily/DASH/TLC (sodium and cholesterol restricted diet)

## 2018-05-06 NOTE — DIETITIAN INITIAL EVALUATION ADULT. - ENERGY NEEDS
ht:5'7" , wt:138.8 pounds, BMI:21.7 kg/m2, IBW:148 pounds +/- 10%     Pt admitted c hypotension, tachycardia, elevated INR, S/P recent admission for AW-STEMI, now c pericardial effusion c tamponade, being monitored closely. Past medical history and events noted.

## 2018-05-06 NOTE — DIETITIAN INITIAL EVALUATION ADULT. - FACTORS AFF FOOD INTAKE
Pt reports in house suboptimal appetite remains, states it started during last admission due to taste changes, questionable if medication related; reports no chewing/swallowing issues; noted last BM 5/6

## 2018-05-06 NOTE — DIETITIAN INITIAL EVALUATION ADULT. - ORAL INTAKE PTA
fair/Pt reports he was eating about 2 meals per day, per significant other he was not eating much, having mostly oatmeal and soup

## 2018-05-06 NOTE — DIETITIAN INITIAL EVALUATION ADULT. - NS AS NUTRI INTERV ED CONTENT
Encouraged PO intake-small, frequent meals and nutrient dense snacks. Discussed protein rich foods available on menu. Discussed consuming protein first at meal times and then eating the other foods.

## 2018-05-06 NOTE — DIETITIAN INITIAL EVALUATION ADULT. - PERTINENT LABORATORY DATA
5/6: sodium 133, glucose 119, 4/21: A1C 5.9%- noted no h/o DM or pre-DM, level indicative of pre-DM, questionable if Pt aware of status 5/6: sodium 133, glucose 119, 4/21: A1C 5.9%- noted no h/o DM or pre-DM, level indicative of pre-DM, questionable if Pt aware of status- made NP aware

## 2018-05-06 NOTE — CHART NOTE - NSCHARTNOTEFT_GEN_A_CORE
====================  CCU MIDNIGHT ROUNDS  ====================    [id number]  Patient is a 77y old  Male who presents with a chief complaint of hypotension, tachycardia, elevated INR (02 May 2018 17:18)    ====================  SUMMARY:  ====================  76yo M PMH recently diagnosed CAD s/p 2DES (pLAD, D1) on 4/18 and 4/19 c/b LV thrombus now on Coumadin, HFrEF (EF 20%) who was sent home with life vest now presenting from INR clinic with elevated INR 7.3, hypotension (SBP 80s) and tachycardia (110s). s/p Vitamin K    ====================  NEW EVENTS:  ====================  Remains hemodynamically stable.  Tolerating Heparin and Coumadin.    MEDICATIONS  (STANDING):  aspirin  chewable 81 milliGRAM(s) Oral daily  atorvastatin 80 milliGRAM(s) Oral at bedtime  clopidogrel Tablet 75 milliGRAM(s) Oral daily  heparin  Infusion 1050 Unit(s)/Hr (10.5 mL/Hr) IV Continuous <Continuous>  lisinopril 2.5 milliGRAM(s) Oral daily  metoprolol succinate ER 25 milliGRAM(s) Oral daily    MEDICATIONS  (PRN):    ====================  VITALS (Last 12 hrs):  ====================    T(C): 36.7 (05-06-18 @ 19:00), Max: 37.2 (05-06-18 @ 11:00)  T(F): 98.1 (05-06-18 @ 19:00), Max: 98.9 (05-06-18 @ 11:00)  HR: 109 (05-06-18 @ 21:00) (93 - 112)  BP: 115/82 (05-06-18 @ 21:00) (80/60 - 115/82)  BP(mean): 92 (05-06-18 @ 21:00) (66 - 92)  ABP: --  ABP(mean): --  RR: 16 (05-06-18 @ 21:00) (14 - 17)  SpO2: 98% (05-06-18 @ 19:00) (94% - 98%)  Wt(kg): --  CVP(mm Hg): --  CVP(cm H2O): --  CO: --  CI: --  PA: --  PA(mean): --  PCWP: --  SVR: --  PVR: --    I&O's Summary    05 May 2018 07:01  -  06 May 2018 07:00  --------------------------------------------------------  IN: 757.5 mL / OUT: 830 mL / NET: -72.5 mL    06 May 2018 07:01  -  06 May 2018 22:08  --------------------------------------------------------  IN: 835.5 mL / OUT: 200 mL / NET: 635.5 mL  ====================  NEW LABS:  ====================    05-06    133<L>  |  99  |  15  ----------------------------<  119<H>  3.9   |  22  |  0.79    Ca    8.2<L>      06 May 2018 03:31  Phos  2.5     05-06  Mg     2.0     05-06    PT/INR - ( 06 May 2018 03:31 )   PT: 17.0 sec;   INR: 1.56 ratio       PTT - ( 06 May 2018 18:04 )  PTT:65.2 sec  ====================  PLAN:  ====================  - Continue to monitor for hemodynamic instability  - TTE showed severe segmental LVSD, moderate to large pericardial effusion adjacent to the RA-RV junction small pericardial effusion superior to the RA and adjacent to the RV  - Continue Heparin drip with bridge to Coumadin for LV thrombus.  Given Coumadin 2.5 mg tonight  - Monitor coags and signs of bleeding  - Repeat TTE in am to follow up pericardial effusion  - Continue ASA, Plavix, and statin  - Continue BB and ACEI  - Continue to hold Lasix for now  - Follow HF recs  - Ambulate as tolerated    Raysa Prado CCU NP  Beeper #[id number]  Spectra # [phone]

## 2018-05-06 NOTE — PROGRESS NOTE ADULT - PROBLEM SELECTOR PLAN 3
supratherapeutic INR S/P VK po   INR  1.5   Restarted on low dose Heparin/coumadin supratherapeutic INR S/P VK po   INR  1.5   Restarted on low dose Heparin/coumadin  Coumadin 2.5 for tonight

## 2018-05-06 NOTE — PROGRESS NOTE ADULT - PROBLEM SELECTOR PLAN 4
Continue to hold Lasix   Fluid balance even, appears euvolemic
Hold lasix due to hypotension  Fluid balance even, appears euvolemic   Follow up w/ HF
Leukocytosis and thrombocytosis, patient may be dehydrated, no s/s of infection at this time, will monitor off abx for now, improving
TTE 4/18: EF 20% with LV thrombus  - c/w home toprol, lisinopril  - hold coumadin for supra therapeutic INR  - will place pacer pads in event patient goes into VT/VF  - tele monitoring

## 2018-05-06 NOTE — DIETITIAN INITIAL EVALUATION ADULT. - ADHERENCE
good/low sodium diet- Pt does not usually use salt at the table or in his cooking, does eat some cheeses; reports he is a lacto-ovo vegetarian, states he had Ensure Enlive during last admission and continued to take it once daily at home

## 2018-05-06 NOTE — PROVIDER CONTACT NOTE (OTHER) - REASON
PTT 66.6, heparin gtt pt specific protocol
Pt c/o L-sided chest pain
PTT 65.2, heparin pt specific protocol

## 2018-05-06 NOTE — PROGRESS NOTE ADULT - ASSESSMENT
76yo M PMH recently diagnosed CAD s/p 2DES (pLAD, D1) on 4/18 and 4/19 c/b LV thrombus now on coumadin, HFpEF (EF 20%) who was sent home with life vest Admitted from INR clinic with elevated INR 7.3, hypotension (SBP 80s) and tachycardia (110s).   Pt INR was normalized with Vitamin K and  pt was  restarted on Heparin and low dose coumadin with concern for LV thrombus . Repeat echo on 5/4 remains  with  pericardial effusion    Currently hemodynamically stable H/h 11/ 32.8      INR 1.56 with JVD  normal  and no complaints 76yo M PMH recently diagnosed CAD s/p 2DES (pLAD, D1) on 4/18 and 4/19 c/b LV thrombus now on coumadin, HFrEF (EF 20%) who was sent home with life vest Admitted from INR clinic with elevated INR 7.3, hypotension (SBP 80s) and tachycardia (110s).   Pt INR was normalized with Vitamin K and  pt was  restarted on Heparin and low dose coumadin given LV thrombus . Repeat echo on 5/4 remains  with  pericardial effusion    Currently hemodynamically stable H/h 11/ 32.8      INR 1.56 with JVD  normal  and no complaints

## 2018-05-06 NOTE — PROVIDER CONTACT NOTE (OTHER) - BACKGROUND
on heparin gtt ptt therapeutic x1 at 1050units/hr pt specific protocol ptt therapeutic goal 60-70, LV thrombus, pericardial effusion, CHF
on heparin gtt pt specific protocol PTT therapeutic range 60-70 per order for LV thrombus, pericardial effusion, on coumadin INR subtherapeutic
pt dx elevated INR and hypotension

## 2018-05-06 NOTE — PROGRESS NOTE ADULT - PROBLEM SELECTOR PLAN 2
remains hemodynamically stable  continue to monitor CBC No lightheadedness or dyspnea.  No change in hemodynamics.  No clinical tamponade.  Repeat TTE tomorrow to evaluate for interval change in effusion.

## 2018-05-06 NOTE — PROVIDER CONTACT NOTE (OTHER) - ASSESSMENT
consider heparin gtt maintain at 1050units/hr
Pt reports L-sided chest pain. Describes as dull and worsening with breathing. Pt denies pain upon palpation. Pt reports having similar pain upon admission. No SOB, diaphoresis, or palpitations. No events, sinus tach on tele @ baseline. /68, Hr 114, O2 97 on 2L, T 98.1.
pt remains free from s/s bleeding, pt remains free from respiratory distress

## 2018-05-07 ENCOUNTER — APPOINTMENT (OUTPATIENT)
Dept: INTERNAL MEDICINE | Facility: CLINIC | Age: 78
End: 2018-05-07

## 2018-05-07 DIAGNOSIS — I50.22 CHRONIC SYSTOLIC (CONGESTIVE) HEART FAILURE: ICD-10-CM

## 2018-05-07 DIAGNOSIS — I50.20 UNSPECIFIED SYSTOLIC (CONGESTIVE) HEART FAILURE: ICD-10-CM

## 2018-05-07 LAB
ANION GAP SERPL CALC-SCNC: 10 MMOL/L — SIGNIFICANT CHANGE UP (ref 5–17)
APTT BLD: 93.7 SEC — HIGH (ref 27.5–37.4)
BUN SERPL-MCNC: 16 MG/DL — SIGNIFICANT CHANGE UP (ref 7–23)
CALCIUM SERPL-MCNC: 8.2 MG/DL — LOW (ref 8.4–10.5)
CHLORIDE SERPL-SCNC: 102 MMOL/L — SIGNIFICANT CHANGE UP (ref 96–108)
CO2 SERPL-SCNC: 24 MMOL/L — SIGNIFICANT CHANGE UP (ref 22–31)
CREAT SERPL-MCNC: 0.81 MG/DL — SIGNIFICANT CHANGE UP (ref 0.5–1.3)
GLUCOSE SERPL-MCNC: 121 MG/DL — HIGH (ref 70–99)
HCT VFR BLD CALC: 32.7 % — LOW (ref 39–50)
HGB BLD-MCNC: 10.8 G/DL — LOW (ref 13–17)
INR BLD: 1.51 RATIO — HIGH (ref 0.88–1.16)
MAGNESIUM SERPL-MCNC: 2 MG/DL — SIGNIFICANT CHANGE UP (ref 1.6–2.6)
MCHC RBC-ENTMCNC: 32.1 PG — SIGNIFICANT CHANGE UP (ref 27–34)
MCHC RBC-ENTMCNC: 33.1 GM/DL — SIGNIFICANT CHANGE UP (ref 32–36)
MCV RBC AUTO: 97.1 FL — SIGNIFICANT CHANGE UP (ref 80–100)
PHOSPHATE SERPL-MCNC: 2.2 MG/DL — LOW (ref 2.5–4.5)
PLATELET # BLD AUTO: 442 K/UL — HIGH (ref 150–400)
POTASSIUM SERPL-MCNC: 4.1 MMOL/L — SIGNIFICANT CHANGE UP (ref 3.5–5.3)
POTASSIUM SERPL-SCNC: 4.1 MMOL/L — SIGNIFICANT CHANGE UP (ref 3.5–5.3)
PROTHROM AB SERPL-ACNC: 16.6 SEC — HIGH (ref 9.8–12.7)
RBC # BLD: 3.36 M/UL — LOW (ref 4.2–5.8)
RBC # FLD: 11.8 % — SIGNIFICANT CHANGE UP (ref 10.3–14.5)
SODIUM SERPL-SCNC: 136 MMOL/L — SIGNIFICANT CHANGE UP (ref 135–145)
WBC # BLD: 6.9 K/UL — SIGNIFICANT CHANGE UP (ref 3.8–10.5)
WBC # FLD AUTO: 6.9 K/UL — SIGNIFICANT CHANGE UP (ref 3.8–10.5)

## 2018-05-07 PROCEDURE — 93321 DOPPLER ECHO F-UP/LMTD STD: CPT | Mod: 26

## 2018-05-07 PROCEDURE — 99233 SBSQ HOSP IP/OBS HIGH 50: CPT | Mod: GC

## 2018-05-07 PROCEDURE — 93308 TTE F-UP OR LMTD: CPT | Mod: 26

## 2018-05-07 PROCEDURE — 93010 ELECTROCARDIOGRAM REPORT: CPT

## 2018-05-07 RX ORDER — HEPARIN SODIUM 5000 [USP'U]/ML
950 INJECTION INTRAVENOUS; SUBCUTANEOUS
Qty: 25000 | Refills: 0 | Status: DISCONTINUED | OUTPATIENT
Start: 2018-05-07 | End: 2018-05-07

## 2018-05-07 RX ORDER — SODIUM,POTASSIUM PHOSPHATES 278-250MG
1 POWDER IN PACKET (EA) ORAL
Qty: 0 | Refills: 0 | Status: COMPLETED | OUTPATIENT
Start: 2018-05-07 | End: 2018-05-07

## 2018-05-07 RX ADMIN — Medication 1 TABLET(S): at 17:59

## 2018-05-07 RX ADMIN — LISINOPRIL 2.5 MILLIGRAM(S): 2.5 TABLET ORAL at 11:14

## 2018-05-07 RX ADMIN — HEPARIN SODIUM 9.5 UNIT(S)/HR: 5000 INJECTION INTRAVENOUS; SUBCUTANEOUS at 05:00

## 2018-05-07 RX ADMIN — Medication 25 MILLIGRAM(S): at 05:33

## 2018-05-07 RX ADMIN — Medication 1 TABLET(S): at 05:33

## 2018-05-07 RX ADMIN — CLOPIDOGREL BISULFATE 75 MILLIGRAM(S): 75 TABLET, FILM COATED ORAL at 11:14

## 2018-05-07 RX ADMIN — Medication 81 MILLIGRAM(S): at 11:15

## 2018-05-07 RX ADMIN — Medication 1 TABLET(S): at 08:01

## 2018-05-07 RX ADMIN — Medication 1 TABLET(S): at 12:25

## 2018-05-07 RX ADMIN — ATORVASTATIN CALCIUM 80 MILLIGRAM(S): 80 TABLET, FILM COATED ORAL at 21:03

## 2018-05-07 NOTE — PROGRESS NOTE ADULT - SUBJECTIVE AND OBJECTIVE BOX
Interval Events:    Feels well this AM but a little lightheaded with standing.    ROS: Denies HA/dizziness/CP/SOB/PND/orthopnea/LE edema/abd pain    MEDICATIONS:  aspirin  chewable 81 milliGRAM(s) Oral daily  clopidogrel Tablet 75 milliGRAM(s) Oral daily  lisinopril 2.5 milliGRAM(s) Oral daily  metoprolol succinate ER 25 milliGRAM(s) Oral daily  atorvastatin 80 milliGRAM(s) Oral at bedtime  potassium acid phosphate/sodium acid phosphate tablet (K-PHOS No. 2) 1 Tablet(s) Oral four times a day with meals      PHYSICAL EXAM:  T(C): 36.6 (05-07-18 @ 08:00), Max: 37.1 (05-07-18 @ 05:00)  HR: 98 (05-07-18 @ 13:00) (84 - 112)  BP: 89/61 (05-07-18 @ 13:00) (80/60 - 115/82)  RR: 16 (05-07-18 @ 13:00) (14 - 18)  SpO2: 98% (05-07-18 @ 13:00) (95% - 100%)  Wt(kg): --  I&O's Summary    06 May 2018 07:01  -  07 May 2018 07:00  --------------------------------------------------------  IN: 1079.5 mL / OUT: 600 mL / NET: 479.5 mL      Appearance: No Acute Distress  HEENT: No JVD  Cardiovascular: Normal S1 S2, RRR, heart sounds easily auscultated  Respiratory: Clear to auscultation bilaterally  Gastrointestinal: Soft, Non-tender  Extremities: No clubbing, cyanosis or edema  Psychiatry: A & O x 3, Mood & affect appropriate    LABS:	 	    CBC Full  -  ( 07 May 2018 04:35 )  WBC Count : 6.9 K/uL  Hemoglobin : 10.8 g/dL  Hematocrit : 32.7 %  Platelet Count - Automated : 442 K/uL  Mean Cell Volume : 97.1 fl  Mean Cell Hemoglobin : 32.1 pg  Mean Cell Hemoglobin Concentration : 33.1 gm/dL  Auto Neutrophil # : x  Auto Lymphocyte # : x  Auto Monocyte # : x  Auto Eosinophil # : x  Auto Basophil # : x  Auto Neutrophil % : x  Auto Lymphocyte % : x  Auto Monocyte % : x  Auto Eosinophil % : x  Auto Basophil % : x    05-07    136  |  102  |  16  ----------------------------<  121<H>  4.1   |  24  |  0.81  05-06    133<L>  |  99  |  15  ----------------------------<  119<H>  3.9   |  22  |  0.79    Ca    8.2<L>      07 May 2018 04:35  Ca    8.2<L>      06 May 2018 03:31  Phos  2.2     05-07  Phos  2.5     05-06  Mg     2.0     05-07  Mg     2.0     05-06        CARDIAC MARKERS:            TELEMETRY:     DIAGNOSTIC TESTING: Interval Events:    Feels well this AM but a little lightheaded with standing.    ROS: Denies HA/dizziness/CP/SOB/PND/orthopnea/LE edema/abd pain    MEDICATIONS:  aspirin  chewable 81 milliGRAM(s) Oral daily  clopidogrel Tablet 75 milliGRAM(s) Oral daily  lisinopril 2.5 milliGRAM(s) Oral daily  metoprolol succinate ER 25 milliGRAM(s) Oral daily  atorvastatin 80 milliGRAM(s) Oral at bedtime  potassium acid phosphate/sodium acid phosphate tablet (K-PHOS No. 2) 1 Tablet(s) Oral four times a day with meals      PHYSICAL EXAM:  T(C): 36.6 (05-07-18 @ 08:00), Max: 37.1 (05-07-18 @ 05:00)  HR: 98 (05-07-18 @ 13:00) (84 - 112)  BP: 89/61 (05-07-18 @ 13:00) (80/60 - 115/82)  RR: 16 (05-07-18 @ 13:00) (14 - 18)  SpO2: 98% (05-07-18 @ 13:00) (95% - 100%)  Wt(kg): --  I&O's Summary    06 May 2018 07:01  -  07 May 2018 07:00  --------------------------------------------------------  IN: 1079.5 mL / OUT: 600 mL / NET: 479.5 mL      Appearance: No Acute Distress  HEENT: Supple. No masses.   Cardiovascular: No elevation of JVP. Normal S1 S2, RRR, heart sounds easily auscultated  Respiratory: Clear to auscultation bilaterally  Gastrointestinal: Soft, Non-tender  Extremities: No clubbing, cyanosis or edema  Psychiatry: A & O x 3, Mood & affect appropriate  Neuro: Grossly intact.     LABS:	 	    CBC Full  -  ( 07 May 2018 04:35 )  WBC Count : 6.9 K/uL  Hemoglobin : 10.8 g/dL  Hematocrit : 32.7 %  Platelet Count - Automated : 442 K/uL  Mean Cell Volume : 97.1 fl  Mean Cell Hemoglobin : 32.1 pg  Mean Cell Hemoglobin Concentration : 33.1 gm/dL  Auto Neutrophil # : x  Auto Lymphocyte # : x  Auto Monocyte # : x  Auto Eosinophil # : x  Auto Basophil # : x  Auto Neutrophil % : x  Auto Lymphocyte % : x  Auto Monocyte % : x  Auto Eosinophil % : x  Auto Basophil % : x    05-07    136  |  102  |  16  ----------------------------<  121<H>  4.1   |  24  |  0.81  05-06    133<L>  |  99  |  15  ----------------------------<  119<H>  3.9   |  22  |  0.79    Ca    8.2<L>      07 May 2018 04:35  Ca    8.2<L>      06 May 2018 03:31  Phos  2.2     05-07  Phos  2.5     05-06  Mg     2.0     05-07  Mg     2.0     05-06

## 2018-05-07 NOTE — PROGRESS NOTE ADULT - ASSESSMENT
This is a 77 year old man with past medical history newly diagnosed CAD s/p 2DES (pLAD, D1) on 4/18 and 4/19 c/b LV thrombus now on coumadin, HFrEF (EF 20%) who was sent home with life vest, Readmitted from INR clinic with elevated INR 7.3, hypotension (SBP 80s) and tachycardia (110s).   TTE shows moderate to large pericardial effusion.  Plan for possible RHC.

## 2018-05-07 NOTE — PROGRESS NOTE ADULT - ASSESSMENT
77 man w/ recent AWSTEMI, ischemic cardiomyopathy (20-25% EF), LV thrombus presenting with suprathereputic INR, and concerns of inability to tolerate PO heart failure medications. He is mildly volume overloaded on exam today at JVP 8. Perfusion is in tact. He is NYHA Class 3, Stage D.  Found to have pericardial effusion with tamponade physiology today. Clinically stable. This is likely hemorrhagic effusion in setting of supratheraputic INR. 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 He presented with suprathereputic INR and development of pericardial effusion, which was likely hemorrhagic. He currently is hemodynamically stable and euvolemic, but is relatively hypotensive and tachycardic.

## 2018-05-07 NOTE — PROGRESS NOTE ADULT - SUBJECTIVE AND OBJECTIVE BOX
CHIEF COMPLAINT::    INTERVAL HISTORY:    REVIEW OF SYSTEMS: all others negative    MEDICATIONS  (STANDING):  aspirin  chewable 81 milliGRAM(s) Oral daily  atorvastatin 80 milliGRAM(s) Oral at bedtime  clopidogrel Tablet 75 milliGRAM(s) Oral daily  heparin  Infusion 950 Unit(s)/Hr (9.5 mL/Hr) IV Continuous <Continuous>  lisinopril 2.5 milliGRAM(s) Oral daily  metoprolol succinate ER 25 milliGRAM(s) Oral daily  potassium acid phosphate/sodium acid phosphate tablet (K-PHOS No. 2) 1 Tablet(s) Oral four times a day with meals    MEDICATIONS  (PRN):      Objective:  Vital Signs Last 24 Hrs  T(C): 37.1 (07 May 2018 05:00), Max: 37.3 (06 May 2018 07:00)  T(F): 98.7 (07 May 2018 05:00), Max: 99.2 (06 May 2018 07:00)  HR: 92 (07 May 2018 06:00) (84 - 120)  BP: 84/62 (07 May 2018 06:00) (80/60 - 115/82)  BP(mean): 69 (07 May 2018 06:00) (63 - 92)  RR: 16 (07 May 2018 06:00) (14 - 18)  SpO2: 96% (07 May 2018 06:00) (94% - 98%)  ICU Vital Signs Last 24 Hrs  T(C): 37.1 (07 May 2018 05:00), Max: 37.3 (06 May 2018 07:00)  T(F): 98.7 (07 May 2018 05:00), Max: 99.2 (06 May 2018 07:00)  HR: 92 (07 May 2018 06:00) (84 - 120)  BP: 84/62 (07 May 2018 06:00) (80/60 - 115/82)  BP(mean): 69 (07 May 2018 06:00) (63 - 92)  ABP: --  ABP(mean): --  RR: 16 (07 May 2018 06:00) (14 - 18)  SpO2: 96% (07 May 2018 06:00) (94% - 98%)      Adult Advanced Hemodynamics Last 24 Hrs  CVP(mm Hg): --  CVP(cm H2O): --  CO: --  CI: --  PA: --  PA(mean): --  PCWP: --  SVR: --  SVRI: --  PVR: --  PVRI: --       @ 07:01  -   @ 07:00  --------------------------------------------------------  IN: 757.5 mL / OUT: 830 mL / NET: -72.5 mL     @ 07:01  -   @ 06:35  --------------------------------------------------------  IN: 1079.5 mL / OUT: 600 mL / NET: 479.5 mL      Daily     Daily Weight in k.9 (06 May 2018 12:07)    PHYSICAL EXAM:      Constitutional:    Eyes:    ENMT:    Neck:    Breasts:    Back:    Respiratory:    Cardiovascular:    Gastrointestinal:    Genitourinary:    Rectal:    Extremities:    Vascular:    Neurological:    Skin:    Lymph Nodes:    Musculoskeletal:    Psychiatric:        TELEMETRY:     EKG:     CARDIAC CATH:     ECHO:    IMAGING:                           10.8   6.9   )-----------( 442      ( 07 May 2018 04:35 )             32.7     05-07    136  |  102  |  16  ----------------------------<  121<H>  4.1   |  24  |  0.81    Ca    8.2<L>      07 May 2018 04:35  Phos  2.2     05-07  Mg     2.0     05-07        PT/INR - ( 07 May 2018 04:35 )   PT: 16.6 sec;   INR: 1.51 ratio         PTT - ( 07 May 2018 04:35 )  PTT:93.7 sec      *BLOOD GAS/ARTERIAL/MIXED/VENOUS  *LACTATE      HEALTH ISSUES - PROBLEM Dx:  Left ventricular apical thrombus following MI: Left ventricular apical thrombus following MI  Pericardial effusion with cardiac tamponade: Pericardial effusion with cardiac tamponade  Preventive measure: Preventive measure  Coronary artery disease: Coronary artery disease  Leukocytosis: Leukocytosis  Supratherapeutic INR: Supratherapeutic INR  Systolic heart failure: Systolic heart failure  Dizziness: Dizziness        HEALTH ISSUES - R/O PROBLEM Dx:      JAIR NelsonU NP   # CHIEF COMPLAINT: Pericardial Effusion 2/2 to Supratherapeutic INR    INTERVAL HISTORY:  This is a 77 year old man with past medical history newly diagnosed CAD s/p 2DES (pLAD, D1) on  and  c/b LV thrombus now on coumadin, HFrEF (EF 20%) who was sent home with life vest, Readmitted from INR clinic with elevated INR 7.3, hypotension (SBP 80s) and tachycardia (110s).   TTE shows moderate to large pericardial effusion.  Plan for possible RHC.    REVIEW OF SYSTEMS: all others negative    MEDICATIONS  (STANDING):  aspirin  chewable 81 milliGRAM(s) Oral daily  atorvastatin 80 milliGRAM(s) Oral at bedtime  clopidogrel Tablet 75 milliGRAM(s) Oral daily  heparin  Infusion 950 Unit(s)/Hr (9.5 mL/Hr) IV Continuous <Continuous>  lisinopril 2.5 milliGRAM(s) Oral daily  metoprolol succinate ER 25 milliGRAM(s) Oral daily  potassium acid phosphate/sodium acid phosphate tablet (K-PHOS No. 2) 1 Tablet(s) Oral four times a day with meals    MEDICATIONS  (PRN):      Objective:  Vital Signs Last 24 Hrs  T(C): 37.1 (07 May 2018 05:00), Max: 37.3 (06 May 2018 07:00)  T(F): 98.7 (07 May 2018 05:00), Max: 99.2 (06 May 2018 07:00)  HR: 92 (07 May 2018 06:00) (84 - 120)  BP: 84/62 (07 May 2018 06:00) (80/60 - 115/82)  BP(mean): 69 (07 May 2018 06:00) (63 - 92)  RR: 16 (07 May 2018 06:00) (14 - 18)  SpO2: 96% (07 May 2018 06:00) (94% - 98%)  ICU Vital Signs Last 24 Hrs  T(C): 37.1 (07 May 2018 05:00), Max: 37.3 (06 May 2018 07:00)  T(F): 98.7 (07 May 2018 05:00), Max: 99.2 (06 May 2018 07:00)  HR: 92 (07 May 2018 06:00) (84 - 120)  BP: 84/62 (07 May 2018 06:00) (80/60 - 115/82)  BP(mean): 69 (07 May 2018 06:00) (63 - 92)  ABP: --  ABP(mean): --  RR: 16 (07 May 2018 06:00) (14 - 18)  SpO2: 96% (07 May 2018 06:00) (94% - 98%)      Adult Advanced Hemodynamics Last 24 Hrs  CVP(mm Hg): --  CVP(cm H2O): --  CO: --  CI: --  PA: --  PA(mean): --  PCWP: --  SVR: --  SVRI: --  PVR: --  PVRI: --       @ 07:01  -   @ 07:00  --------------------------------------------------------  IN: 757.5 mL / OUT: 830 mL / NET: -72.5 mL     @ 07:01  -   @ 06:35  --------------------------------------------------------  IN: 1079.5 mL / OUT: 600 mL / NET: 479.5 mL      Daily     Daily Weight in k.9 (06 May 2018 12:07)    PHYSICAL EXAM:      Constitutional:    Eyes:    ENMT:    Neck:    Breasts:    Back:    Respiratory:    Cardiovascular:    Gastrointestinal:    Genitourinary:    Rectal:    Extremities:    Vascular:    Neurological:    Skin:    Lymph Nodes:    Musculoskeletal:    Psychiatric:        TELEMETRY:     EKG:     CARDIAC CATH:     ECHO:    IMAGING:                           10.8   6.9   )-----------( 442      ( 07 May 2018 04:35 )             32.7     05-07    136  |  102  |  16  ----------------------------<  121<H>  4.1   |  24  |  0.81    Ca    8.2<L>      07 May 2018 04:35  Phos  2.2     05-07  Mg     2.0     05-07        PT/INR - ( 07 May 2018 04:35 )   PT: 16.6 sec;   INR: 1.51 ratio         PTT - ( 07 May 2018 04:35 )  PTT:93.7 sec      *BLOOD GAS/ARTERIAL/MIXED/VENOUS  *LACTATE      HEALTH ISSUES - PROBLEM Dx:  Left ventricular apical thrombus following MI: Left ventricular apical thrombus following MI  Pericardial effusion with cardiac tamponade: Pericardial effusion with cardiac tamponade  Preventive measure: Preventive measure  Coronary artery disease: Coronary artery disease  Leukocytosis: Leukocytosis  Supratherapeutic INR: Supratherapeutic INR  Systolic heart failure: Systolic heart failure  Dizziness: Dizziness        HEALTH ISSUES - R/O PROBLEM Dx:      DAISHA Nelson NP   # CHIEF COMPLAINT: Pericardial Effusion 2/2 to Supratherapeutic INR    INTERVAL HISTORY:  This is a 77 year old man with past medical history newly diagnosed CAD s/p 2DES (pLAD, D1) on  and  c/b LV thrombus now on coumadin, HFrEF (EF 20%) who was sent home with life vest, Readmitted from INR clinic with elevated INR 7.3, hypotension (SBP 80s) and tachycardia (110s).   TTE shows moderate to large pericardial effusion.  Plan for possible RHC.    REVIEW OF SYSTEMS: Denies CP, SOB, Palpitations, all others negative    MEDICATIONS  (STANDING):  aspirin  chewable 81 milliGRAM(s) Oral daily  atorvastatin 80 milliGRAM(s) Oral at bedtime  clopidogrel Tablet 75 milliGRAM(s) Oral daily  heparin  Infusion 950 Unit(s)/Hr (9.5 mL/Hr) IV Continuous <Continuous>  lisinopril 2.5 milliGRAM(s) Oral daily  metoprolol succinate ER 25 milliGRAM(s) Oral daily  potassium acid phosphate/sodium acid phosphate tablet (K-PHOS No. 2) 1 Tablet(s) Oral four times a day with meals    MEDICATIONS  (PRN):      Objective:  Vital Signs Last 24 Hrs  T(C): 37.1 (07 May 2018 05:00), Max: 37.3 (06 May 2018 07:00)  T(F): 98.7 (07 May 2018 05:00), Max: 99.2 (06 May 2018 07:00)  HR: 92 (07 May 2018 06:00) (84 - 120)  BP: 84/62 (07 May 2018 06:00) (80/60 - 115/82)  BP(mean): 69 (07 May 2018 06:00) (63 - 92)  RR: 16 (07 May 2018 06:00) (14 - 18)  SpO2: 96% (07 May 2018 06:00) (94% - 98%)  ICU Vital Signs Last 24 Hrs  T(C): 37.1 (07 May 2018 05:00), Max: 37.3 (06 May 2018 07:00)  T(F): 98.7 (07 May 2018 05:00), Max: 99.2 (06 May 2018 07:00)  HR: 92 (07 May 2018 06:00) (84 - 120)  BP: 84/62 (07 May 2018 06:00) (80/60 - 115/82)  BP(mean): 69 (07 May 2018 06:00) (63 - 92)  ABP: --  ABP(mean): --  RR: 16 (07 May 2018 06:00) (14 - 18)  SpO2: 96% (07 May 2018 06:00) (94% - 98%)      Adult Advanced Hemodynamics Last 24 Hrs  CVP(mm Hg): --  CVP(cm H2O): --  CO: --  CI: --  PA: --  PA(mean): --  PCWP: --  SVR: --  SVRI: --  PVR: --  PVRI: --       @ 07:01  -   @ 07:00  --------------------------------------------------------  IN: 757.5 mL / OUT: 830 mL / NET: -72.5 mL     @ 07:01  -   @ 06:35  --------------------------------------------------------  IN: 1079.5 mL / OUT: 600 mL / NET: 479.5 mL      Daily     Daily Weight in k.9 (06 May 2018 12:07)    PHYSICAL EXAM:      Constitutional: No acute distress    Neuro: A+OX3    Respiratory: CTA Bilaterally    Cardiovascular: S1S2 RRR    Gastrointestinal: +bs    Extremities: No pedal edema    Vascular: +1 pedal pulses        TELEMETRY: Sr-ST    EKG:     CARDIAC CATH:     ECHO:  < from: Transthoracic Echocardiogram (18 @ 08:26) >    Patient name: LUNA COOPER  YOB: 1940   Age: 77 (M)   MR#: [id number]  Study Date: 2018  [id number] Em Horton RDCS  Study quality: Technically fair  Referring Physician: Roseanne French MD  Blood Pressure: 104/72 mmHg  Height: 170 cm  Weight: 62 kg  BSA: 1.7 m2  ------------------------------------------------------------------------  PROCEDURE: Transthoracic echocardiogram with 2-D, M-Mode  and complete spectral and color flow Doppler.  INDICATION: Chest pain, unspecified (R07.9)  ------------------------------------------------------------------------  Observations:  Aortic Valve/Aorta:  Left Ventricle: Severe segmental left ventricular systolic  dysfunction.  Pericardium/Pleura: Moderate to large pericardial effusion  seen adjacent to the right ventricle. Impaired RV diastolic  filling. Fibrinous material is seen within the effusion.  Left pleural effusion.  Hemodynamic: Estimated right atrial pressure is 8 mm Hg.  ------------------------------------------------------------------------  Conclusions:  1. Severe segmental left ventricular systolic dysfunction.  2. Moderate to large pericardial effusion seen adjacent to  the right atrium- right ventricle junction in the subcostal  view. Small pericardial effusion superior to the right  atrium and adjacent to the right ventricle in the subcostal  view. Impaired RV diastolic filling. Fibrinous material is  seen within the effusion.  3. Left pleural effusion.  *** Compared with echocardiogram of 2018, transmitral  respirophasic variation is not seen.  ------------------------------------------------------------------------  Confirmed on  2018 - 10:08:20 by Ross Vu M.D.  ------------------------------------------------------------------------    < end of copied text >      IMAGING:                           10.8   6.9   )-----------( 442      ( 07 May 2018 04:35 )             32.7     05-07    136  |  102  |  16  ----------------------------<  121<H>  4.1   |  24  |  0.81    Ca    8.2<L>      07 May 2018 04:35  Phos  2.2     05-07  Mg     2.0     05-07        PT/INR - ( 07 May 2018 04:35 )   PT: 16.6 sec;   INR: 1.51 ratio         PTT - ( 07 May 2018 04:35 )  PTT:93.7 sec      *BLOOD GAS/ARTERIAL/MIXED/VENOUS  *LACTATE      HEALTH ISSUES - PROBLEM Dx:  Left ventricular apical thrombus following MI: Left ventricular apical thrombus following MI  Pericardial effusion with cardiac tamponade: Pericardial effusion with cardiac tamponade  Preventive measure: Preventive measure  Coronary artery disease: Coronary artery disease  Leukocytosis: Leukocytosis  Supratherapeutic INR: Supratherapeutic INR  Systolic heart failure: Systolic heart failure  Dizziness: Dizziness        HEALTH ISSUES - R/O PROBLEM Dx:      DAISHA Nelson NP   #

## 2018-05-07 NOTE — PROGRESS NOTE ADULT - PROBLEM SELECTOR PLAN 1
2/2 to Supratherapeutic INR?  Moderate to large anterior pericardial effusion seen on TTE  HR SR-ST 's, BP sbp 80-90s, No SOB Tachypnea  Stop Heparin, no A/C  Repeat TTE

## 2018-05-07 NOTE — PROGRESS NOTE ADULT - PROBLEM SELECTOR PLAN 1
- Stable at current, although somewhat lightheaded w/ ambulation; would check orthostatics  - continue to hold diuretics  - Likely hold off on draining effusion but will d/w team - Stable at current, although somewhat lightheaded w/ ambulation; would check orthostatics  - continue to hold diuretics

## 2018-05-08 ENCOUNTER — TRANSCRIPTION ENCOUNTER (OUTPATIENT)
Age: 78
End: 2018-05-08

## 2018-05-08 VITALS
DIASTOLIC BLOOD PRESSURE: 72 MMHG | TEMPERATURE: 98 F | HEART RATE: 98 BPM | RESPIRATION RATE: 16 BRPM | SYSTOLIC BLOOD PRESSURE: 96 MMHG

## 2018-05-08 LAB
ANION GAP SERPL CALC-SCNC: 9 MMOL/L — SIGNIFICANT CHANGE UP (ref 5–17)
APTT BLD: 30.5 SEC — SIGNIFICANT CHANGE UP (ref 27.5–37.4)
BLD GP AB SCN SERPL QL: NEGATIVE — SIGNIFICANT CHANGE UP
BUN SERPL-MCNC: 13 MG/DL — SIGNIFICANT CHANGE UP (ref 7–23)
CALCIUM SERPL-MCNC: 8.2 MG/DL — LOW (ref 8.4–10.5)
CHLORIDE SERPL-SCNC: 101 MMOL/L — SIGNIFICANT CHANGE UP (ref 96–108)
CO2 SERPL-SCNC: 27 MMOL/L — SIGNIFICANT CHANGE UP (ref 22–31)
CREAT SERPL-MCNC: 0.84 MG/DL — SIGNIFICANT CHANGE UP (ref 0.5–1.3)
GLUCOSE SERPL-MCNC: 112 MG/DL — HIGH (ref 70–99)
HCT VFR BLD CALC: 31.1 % — LOW (ref 39–50)
HGB BLD-MCNC: 10.4 G/DL — LOW (ref 13–17)
INR BLD: 1.56 RATIO — HIGH (ref 0.88–1.16)
MAGNESIUM SERPL-MCNC: 2 MG/DL — SIGNIFICANT CHANGE UP (ref 1.6–2.6)
MCHC RBC-ENTMCNC: 32 PG — SIGNIFICANT CHANGE UP (ref 27–34)
MCHC RBC-ENTMCNC: 33.3 GM/DL — SIGNIFICANT CHANGE UP (ref 32–36)
MCV RBC AUTO: 95.9 FL — SIGNIFICANT CHANGE UP (ref 80–100)
PHOSPHATE SERPL-MCNC: 3 MG/DL — SIGNIFICANT CHANGE UP (ref 2.5–4.5)
PLATELET # BLD AUTO: 415 K/UL — HIGH (ref 150–400)
POTASSIUM SERPL-MCNC: 4.1 MMOL/L — SIGNIFICANT CHANGE UP (ref 3.5–5.3)
POTASSIUM SERPL-SCNC: 4.1 MMOL/L — SIGNIFICANT CHANGE UP (ref 3.5–5.3)
PROTHROM AB SERPL-ACNC: 17 SEC — HIGH (ref 9.8–12.7)
RBC # BLD: 3.24 M/UL — LOW (ref 4.2–5.8)
RBC # FLD: 11.8 % — SIGNIFICANT CHANGE UP (ref 10.3–14.5)
RH IG SCN BLD-IMP: POSITIVE — SIGNIFICANT CHANGE UP
SODIUM SERPL-SCNC: 137 MMOL/L — SIGNIFICANT CHANGE UP (ref 135–145)
WBC # BLD: 5.9 K/UL — SIGNIFICANT CHANGE UP (ref 3.8–10.5)
WBC # FLD AUTO: 5.9 K/UL — SIGNIFICANT CHANGE UP (ref 3.8–10.5)

## 2018-05-08 PROCEDURE — 93010 ELECTROCARDIOGRAM REPORT: CPT

## 2018-05-08 RX ORDER — CLOPIDOGREL BISULFATE 75 MG/1
1 TABLET, FILM COATED ORAL
Qty: 30 | Refills: 11 | OUTPATIENT
Start: 2018-05-08 | End: 2019-05-02

## 2018-05-08 RX ORDER — LISINOPRIL 2.5 MG/1
1 TABLET ORAL
Qty: 30 | Refills: 2 | OUTPATIENT
Start: 2018-05-08 | End: 2018-08-05

## 2018-05-08 RX ORDER — METOPROLOL TARTRATE 50 MG
1 TABLET ORAL
Qty: 30 | Refills: 2 | OUTPATIENT
Start: 2018-05-08 | End: 2018-08-05

## 2018-05-08 RX ORDER — ATORVASTATIN CALCIUM 80 MG/1
1 TABLET, FILM COATED ORAL
Qty: 30 | Refills: 2 | OUTPATIENT
Start: 2018-05-08 | End: 2018-08-05

## 2018-05-08 RX ORDER — ASPIRIN/CALCIUM CARB/MAGNESIUM 324 MG
1 TABLET ORAL
Qty: 30 | Refills: 2 | OUTPATIENT
Start: 2018-05-08 | End: 2018-08-05

## 2018-05-08 RX ADMIN — Medication 81 MILLIGRAM(S): at 12:10

## 2018-05-08 RX ADMIN — Medication 25 MILLIGRAM(S): at 05:17

## 2018-05-08 RX ADMIN — LISINOPRIL 2.5 MILLIGRAM(S): 2.5 TABLET ORAL at 12:10

## 2018-05-08 RX ADMIN — CLOPIDOGREL BISULFATE 75 MILLIGRAM(S): 75 TABLET, FILM COATED ORAL at 12:10

## 2018-05-08 NOTE — DISCHARGE NOTE ADULT - HOSPITAL COURSE
This is a 77 year old man with past medical history newly diagnosed CAD s/p 2DES (pLAD, D1) on 4/18 and 4/19 c/b LV thrombus now on coumadin, HFrEF (EF 20%) who was sent home with life vest, Readmitted from INR clinic with elevated INR 7.3, hypotension (SBP 80s) and tachycardia (110s).   TTE shows moderate to large pericardial effusion, no LV Thrombus 5/4 and 5/5.  Follow up TTE 5/7 shows reduction in pericardial effusion.  Follow up with Cardiac Clinic within 1 week for medication review.  Follow up with Cardiac Clinic in 2 weeks for follow up echocardiogram to evaluate pericardial effusion resolution.

## 2018-05-08 NOTE — PROGRESS NOTE ADULT - ASSESSMENT
This is a 77 year old man with past medical history newly diagnosed CAD s/p 2DES (pLAD, D1) on 4/18 and 4/19 c/b LV thrombus now on coumadin, HFrEF (EF 20%) who was sent home with life vest, Readmitted from INR clinic with elevated INR 7.3, hypotension (SBP 80s) and tachycardia (110s).   TTE shows moderate to large pericardial effusion.  Plan for possible RHC. This is a 77 year old man with past medical history newly diagnosed CAD s/p 2DES (pLAD, D1) on 4/18 and 4/19 c/b LV thrombus now on coumadin, HFrEF (EF 20%) who was sent home with life vest, Readmitted from INR clinic with elevated INR 7.3, hypotension (SBP 80s) and tachycardia (110s).   TTE shows moderate to large pericardial effusion, no LV Thrombus 5/4 and 5/5.  Follow up TTE 5/7 shows reduction in pericardial effusion.

## 2018-05-08 NOTE — PROGRESS NOTE ADULT - SUBJECTIVE AND OBJECTIVE BOX
Interval Events:    Feels like he's still not walking like his normal self, but no other symptoms.    ROS: Denies HA/dizziness/CP/SOB/PND/orthopnea/LE edema/abd pain    MEDICATIONS:  aspirin  chewable 81 milliGRAM(s) Oral daily  clopidogrel Tablet 75 milliGRAM(s) Oral daily  lisinopril 2.5 milliGRAM(s) Oral daily  metoprolol succinate ER 25 milliGRAM(s) Oral daily  atorvastatin 80 milliGRAM(s) Oral at bedtime      PHYSICAL EXAM:  T(C): 36.9 (05-08-18 @ 12:00), Max: 36.9 (05-08-18 @ 12:00)  HR: 94 (05-08-18 @ 14:00) (82 - 107)  BP: 92/63 (05-08-18 @ 14:00) (80/61 - 97/60)  RR: 16 (05-08-18 @ 13:00) (15 - 18)  SpO2: 98% (05-08-18 @ 12:00) (96% - 98%)  Wt(kg): --  I&O's Summary    07 May 2018 07:01  -  08 May 2018 07:00  --------------------------------------------------------  IN: 540 mL / OUT: 400 mL / NET: 140 mL    Appearance: No Acute Distress  HEENT: Trachea midline  Cardiovascular: Normal S1 S2, RRR, No murmurs/rubs/gallops  JVP < 8cm  Respiratory: Clear to auscultation bilaterally  Gastrointestinal: +BS, Soft, Non-tender  Extremities: No clubbing, cyanosis or edema  Psychiatry: A & O x 3, Mood & affect appropriate    LABS:	 	    CBC Full  -  ( 08 May 2018 04:17 )  WBC Count : 5.9 K/uL  Hemoglobin : 10.4 g/dL  Hematocrit : 31.1 %  Platelet Count - Automated : 415 K/uL  Mean Cell Volume : 95.9 fl  Mean Cell Hemoglobin : 32.0 pg  Mean Cell Hemoglobin Concentration : 33.3 gm/dL  Auto Neutrophil # : x  Auto Lymphocyte # : x  Auto Monocyte # : x  Auto Eosinophil # : x  Auto Basophil # : x  Auto Neutrophil % : x  Auto Lymphocyte % : x  Auto Monocyte % : x  Auto Eosinophil % : x  Auto Basophil % : x    05-08    137  |  101  |  13  ----------------------------<  112<H>  4.1   |  27  |  0.84  05-07    136  |  102  |  16  ----------------------------<  121<H>  4.1   |  24  |  0.81    Ca    8.2<L>      08 May 2018 04:16  Ca    8.2<L>      07 May 2018 04:35  Phos  3.0     05-08  Phos  2.2     05-07  Mg     2.0     05-08  Mg     2.0     05-07

## 2018-05-08 NOTE — DISCHARGE NOTE ADULT - PATIENT PORTAL LINK FT
You can access the [facility] Portal, offered by [facility], by registering with the following website: [email or web address]

## 2018-05-08 NOTE — DISCHARGE NOTE ADULT - MEDICATION SUMMARY - MEDICATIONS TO TAKE
I will START or STAY ON the medications listed below when I get home from the hospital:    aspirin 81 mg oral tablet, chewable  -- 1 tab(s) by mouth once a day  -- Indication: For Coronary artery disease    lisinopril 2.5 mg oral tablet  -- 1 tab(s) by mouth once a day  -- Indication: For Coronary artery disease    atorvastatin 80 mg oral tablet  -- 1 tab(s) by mouth once a day (at bedtime)  -- Indication: For Coronary artery disease    clopidogrel 75 mg oral tablet  -- 1 tab(s) by mouth once a day  -- Indication: For Coronary artery disease    metoprolol succinate 25 mg oral tablet, extended release  -- 1 tab(s) by mouth once a day (at bedtime)   -- Indication: For Coronary artery disease

## 2018-05-08 NOTE — PROGRESS NOTE ADULT - SUBJECTIVE AND OBJECTIVE BOX
CHIEF COMPLAINT::    INTERVAL HISTORY:    REVIEW OF SYSTEMS: all others negative    MEDICATIONS  (STANDING):  aspirin  chewable 81 milliGRAM(s) Oral daily  atorvastatin 80 milliGRAM(s) Oral at bedtime  clopidogrel Tablet 75 milliGRAM(s) Oral daily  lisinopril 2.5 milliGRAM(s) Oral daily  metoprolol succinate ER 25 milliGRAM(s) Oral daily    MEDICATIONS  (PRN):      Objective:  Vital Signs Last 24 Hrs  T(C): 36.6 (08 May 2018 05:00), Max: 36.7 (07 May 2018 15:00)  T(F): 97.9 (08 May 2018 05:00), Max: 98 (07 May 2018 15:00)  HR: 88 (08 May 2018 06:00) (82 - 107)  BP: 91/71 (08 May 2018 06:00) (80/61 - 113/76)  BP(mean): 78 (08 May 2018 06:00) (64 - 84)  RR: 17 (08 May 2018 06:00) (16 - 18)  SpO2: 96% (07 May 2018 19:00) (95% - 100%)  ICU Vital Signs Last 24 Hrs  T(C): 36.6 (08 May 2018 05:00), Max: 36.7 (07 May 2018 15:00)  T(F): 97.9 (08 May 2018 05:00), Max: 98 (07 May 2018 15:00)  HR: 88 (08 May 2018 06:00) (82 - 107)  BP: 91/71 (08 May 2018 06:00) (80/61 - 113/76)  BP(mean): 78 (08 May 2018 06:00) (64 - 84)  ABP: --  ABP(mean): --  RR: 17 (08 May 2018 06:00) (16 - 18)  SpO2: 96% (07 May 2018 19:00) (95% - 100%)      Adult Advanced Hemodynamics Last 24 Hrs  CVP(mm Hg): --  CVP(cm H2O): --  CO: --  CI: --  PA: --  PA(mean): --  PCWP: --  SVR: --  SVRI: --  PVR: --  PVRI: --      05-06 @ 07:01  -  05-07 @ 07:00  --------------------------------------------------------  IN: 1079.5 mL / OUT: 600 mL / NET: 479.5 mL     @ 07:01  -   @ 06:35  --------------------------------------------------------  IN: 540 mL / OUT: 400 mL / NET: 140 mL      Daily     Daily Weight in k.6 (08 May 2018 05:00)    PHYSICAL EXAM:      Constitutional:    Eyes:    ENMT:    Neck:    Breasts:    Back:    Respiratory:    Cardiovascular:    Gastrointestinal:    Genitourinary:    Rectal:    Extremities:    Vascular:    Neurological:    Skin:    Lymph Nodes:    Musculoskeletal:    Psychiatric:        TELEMETRY:     EKG:     CARDIAC CATH:     ECHO:    IMAGING:                           10.4   5.9   )-----------( 415      ( 08 May 2018 04:17 )             31.1     05-08    137  |  101  |  13  ----------------------------<  112<H>  4.1   |  27  |  0.84    Ca    8.2<L>      08 May 2018 04:16  Phos  3.0     05-08  Mg     2.0     05-08        PT/INR - ( 08 May 2018 04:17 )   PT: 17.0 sec;   INR: 1.56 ratio         PTT - ( 08 May 2018 04:17 )  PTT:30.5 sec      *BLOOD GAS/ARTERIAL/MIXED/VENOUS  *LACTATE      HEALTH ISSUES - PROBLEM Dx:  Chronic systolic heart failure: Chronic systolic heart failure  Systolic heart failure, unspecified HF chronicity: Systolic heart failure, unspecified HF chronicity  Left ventricular apical thrombus following MI: Left ventricular apical thrombus following MI  Pericardial effusion with cardiac tamponade: Pericardial effusion with cardiac tamponade  Preventive measure: Preventive measure  Coronary artery disease: Coronary artery disease  Leukocytosis: Leukocytosis  Supratherapeutic INR: Supratherapeutic INR  Systolic heart failure: Systolic heart failure  Dizziness: Dizziness        HEALTH ISSUES - R/O PROBLEM Dx:      [facility] NP   #[id number] CHIEF COMPLAINT: Pericardial Effusion 2/2 to Supertherapeutic INR    INTERVAL HISTORY:  This is a 77 year old man with past medical history newly diagnosed CAD s/p 2DES (pLAD, D1) on  and  c/b LV thrombus now on coumadin, HFrEF (EF 20%) who was sent home with life vest, Readmitted from INR clinic with elevated INR 7.3, hypotension (SBP 80s) and tachycardia (110s).   TTE shows moderate to large pericardial effusion.  Plan for possible RHC.    REVIEW OF SYSTEMS: all others negative    MEDICATIONS  (STANDING):  aspirin  chewable 81 milliGRAM(s) Oral daily  atorvastatin 80 milliGRAM(s) Oral at bedtime  clopidogrel Tablet 75 milliGRAM(s) Oral daily  lisinopril 2.5 milliGRAM(s) Oral daily  metoprolol succinate ER 25 milliGRAM(s) Oral daily    MEDICATIONS  (PRN):      Objective:  Vital Signs Last 24 Hrs  T(C): 36.6 (08 May 2018 05:00), Max: 36.7 (07 May 2018 15:00)  T(F): 97.9 (08 May 2018 05:00), Max: 98 (07 May 2018 15:00)  HR: 88 (08 May 2018 06:00) (82 - 107)  BP: 91/71 (08 May 2018 06:00) (80/61 - 113/76)  BP(mean): 78 (08 May 2018 06:00) (64 - 84)  RR: 17 (08 May 2018 06:00) (16 - 18)  SpO2: 96% (07 May 2018 19:00) (95% - 100%)  ICU Vital Signs Last 24 Hrs  T(C): 36.6 (08 May 2018 05:00), Max: 36.7 (07 May 2018 15:00)  T(F): 97.9 (08 May 2018 05:00), Max: 98 (07 May 2018 15:00)  HR: 88 (08 May 2018 06:00) (82 - 107)  BP: 91/71 (08 May 2018 06:00) (80/61 - 113/76)  BP(mean): 78 (08 May 2018 06:00) (64 - 84)  ABP: --  ABP(mean): --  RR: 17 (08 May 2018 06:00) (16 - 18)  SpO2: 96% (07 May 2018 19:00) (95% - 100%)      Adult Advanced Hemodynamics Last 24 Hrs  CVP(mm Hg): --  CVP(cm H2O): --  CO: --  CI: --  PA: --  PA(mean): --  PCWP: --  SVR: --  SVRI: --  PVR: --  PVRI: --       @ 07:01  -   @ 07:00  --------------------------------------------------------  IN: 1079.5 mL / OUT: 600 mL / NET: 479.5 mL     @ 07:01  -   @ 06:35  --------------------------------------------------------  IN: 540 mL / OUT: 400 mL / NET: 140 mL      Daily     Daily Weight in k.6 (08 May 2018 05:00)    PHYSICAL EXAM:      Constitutional:    Eyes:    ENMT:    Neck:    Breasts:    Back:    Respiratory:    Cardiovascular:    Gastrointestinal:    Genitourinary:    Rectal:    Extremities:    Vascular:    Neurological:    Skin:    Lymph Nodes:    Musculoskeletal:    Psychiatric:        TELEMETRY:     EKG:     CARDIAC CATH:     ECHO:    IMAGING:                           10.4   5.9   )-----------( 415      ( 08 May 2018 04:17 )             31.1     -08    137  |  101  |  13  ----------------------------<  112<H>  4.1   |  27  |  0.84    Ca    8.2<L>      08 May 2018 04:16  Phos  3.0     05-08  Mg     2.0     05-08        PT/INR - ( 08 May 2018 04:17 )   PT: 17.0 sec;   INR: 1.56 ratio         PTT - ( 08 May 2018 04:17 )  PTT:30.5 sec      *BLOOD GAS/ARTERIAL/MIXED/VENOUS  *LACTATE      HEALTH ISSUES - PROBLEM Dx:  Chronic systolic heart failure: Chronic systolic heart failure  Systolic heart failure, unspecified HF chronicity: Systolic heart failure, unspecified HF chronicity  Left ventricular apical thrombus following MI: Left ventricular apical thrombus following MI  Pericardial effusion with cardiac tamponade: Pericardial effusion with cardiac tamponade  Preventive measure: Preventive measure  Coronary artery disease: Coronary artery disease  Leukocytosis: Leukocytosis  Supratherapeutic INR: Supratherapeutic INR  Systolic heart failure: Systolic heart failure  Dizziness: Dizziness        HEALTH ISSUES - R/O PROBLEM Dx:      DAISHA Nelson NP   #[id number] CHIEF COMPLAINT: Pericardial Effusion 2/2 to Supertherapeutic INR    INTERVAL HISTORY:  This is a 77 year old man with past medical history newly diagnosed CAD s/p 2DES (pLAD, D1) on  and  c/b LV thrombus now on coumadin, HFrEF (EF 20%) who was sent home with life vest, Readmitted from INR clinic with elevated INR 7.3, hypotension (SBP 80s) and tachycardia (110s).   TTE shows moderate to large pericardial effusion, no LV Thrombus  and .  Follow up TTE  shows reduction in pericardial effusion.    REVIEW OF SYSTEMS: Denies CP, SOB, Palpitations, all others negative    MEDICATIONS  (STANDING):  aspirin  chewable 81 milliGRAM(s) Oral daily  atorvastatin 80 milliGRAM(s) Oral at bedtime  clopidogrel Tablet 75 milliGRAM(s) Oral daily  lisinopril 2.5 milliGRAM(s) Oral daily  metoprolol succinate ER 25 milliGRAM(s) Oral daily    MEDICATIONS  (PRN):      Objective:  Vital Signs Last 24 Hrs  T(C): 36.6 (08 May 2018 05:00), Max: 36.7 (07 May 2018 15:00)  T(F): 97.9 (08 May 2018 05:00), Max: 98 (07 May 2018 15:00)  HR: 88 (08 May 2018 06:00) (82 - 107)  BP: 91/71 (08 May 2018 06:00) (80/61 - 113/76)  BP(mean): 78 (08 May 2018 06:00) (64 - 84)  RR: 17 (08 May 2018 06:00) (16 - 18)  SpO2: 96% (07 May 2018 19:00) (95% - 100%)  ICU Vital Signs Last 24 Hrs  T(C): 36.6 (08 May 2018 05:00), Max: 36.7 (07 May 2018 15:00)  T(F): 97.9 (08 May 2018 05:00), Max: 98 (07 May 2018 15:00)  HR: 88 (08 May 2018 06:00) (82 - 107)  BP: 91/71 (08 May 2018 06:00) (80/61 - 113/76)  BP(mean): 78 (08 May 2018 06:00) (64 - 84)  ABP: --  ABP(mean): --  RR: 17 (08 May 2018 06:00) (16 - 18)  SpO2: 96% (07 May 2018 19:00) (95% - 100%)      Adult Advanced Hemodynamics Last 24 Hrs  CVP(mm Hg): --  CVP(cm H2O): --  CO: --  CI: --  PA: --  PA(mean): --  PCWP: --  SVR: --  SVRI: --  PVR: --  PVRI: --       @ 07:01  -   @ 07:00  --------------------------------------------------------  IN: 1079.5 mL / OUT: 600 mL / NET: 479.5 mL     @ 07:01  -   @ 06:35  --------------------------------------------------------  IN: 540 mL / OUT: 400 mL / NET: 140 mL      Daily     Daily Weight in k.6 (08 May 2018 05:00)    PHYSICAL EXAM:      Constitutional: No acute distress    Neuro: A+OX3    Respiratory: CTA Bilaterally    Cardiovascular: S1S2 RRR    Gastrointestinal: +BS    Extremities: No pedal edema    Vascular: +1 pedal pulses          TELEMETRY: SR-ST 80-100s with unifocal PVCs    EKG:     CARDIAC CATH:   < from: Cardiac Cath Lab - Adult (18 @ 11:57) >    [facility]  Department of Cardiology  [address] [place] [address]  [phone]  Cath Lab Report -- Comprehensive Report  Patient: LUNA COOPER  Study date: 2018  Account number: [id number]  MR number: [id number]  : 1940  Gender: Male  Race: W  Case Physician(s):  Cole Rees M.D.  Fellow:  Miky Levy M.D.  Referring Physician:  Cole Rees M.D.  INDICATIONS: Unstable angina - CCS4.  HISTORY: The patient has a history of coronary arterydisease. The patient  has hypertension and medication-treated dyslipidemia.  PROCEDURE:  --  Intervention on D1: drug-eluting stent.  TECHNIQUE: Oxygen 4 L/min. The risks and alternatives of the procedures and  conscious sedation were explained to the patient and informed consent was  obtained. Cardiac catheterization performed urgently. Coronary  intervention performed electively.  Local anesthetic given. Right radial artery access. RADIATION EXPOSURE: 6.2  min. A successful drug-eluting stent was performed on the 90 % lesion in  the 1st diagonal. Following intervention there was a 1 % residual  stenosis. According to the ACC/AHA classification system, this lesion was  a type C lesion. There was JOSE MANUEL 3 flow before the procedure and JOSE MANUEL 3  flow after the procedure. Vessel setup was performed. A 6FR JL3.5 LAUNCHER  guiding catheter was used to intubate the vessel. Vessel setup was  performed. A [employer] UNIVERSAL 190CM wire was used to cross the lesion. A 2.50  X 15 ELLIE drug-eluting stent wasplaced across the lesion and deployed at  a maximum inflation pressure of 12 benjamín.  CONTRAST GIVEN: Omnipaque 56 ml.  MEDICATIONS GIVEN: Fentanyl, 25 mcg, IV. Midazolam, 1 mg, IV. Verapamil  (Isoptin, Calan, Covera), 2.5 mg, IA. Heparin, 5000 units, IV.  CORONARY VESSELS:  LAD:   --  D1: There was a 90 % stenosis.  COMPLICATIONS: There were no complications.  DIAGNOSTIC RECOMMENDATIONS: ASA and Plavix for 1 year.  INTERVENTIONAL RECOMMENDATIONS: ASA and Plavix for 1 year.  Prepared and signed by  Cole Rees M.D.  Signed 2018 13:00:44  HEMODYNAMIC TABLES  Pressures:  Baseline  Pressures:  - HR: 100  Pressures:  - Rhythm:  Pressures:  -- Aortic Pressure (S/D/M): 91/53/62  Pressures:  Intervention  Pressures:  - HR: 91  Pressures:  - Rhythm:  Pressures:  -- Aortic Pressure (S/D/M): 91/54/67  Outputs:  Baseline  Outputs:  -- CALCULATIONS[age]  Outputs:  -- CALCULATIONS: Body Surface Area: 1.79  Outputs:  -- CALCULATIONS: Height in cm: 170.00  < from: Cardiac Cath Lab - Adult (18 @ 11:19) >  [facility]  Department of Cardiology  [address] [place] [address] [phone]  Cath Lab Report -- Comprehensive Report  Patient: LUNA COOPER  Study date: 2018  Account number: [id number]  MR number: [id number]  : 1940  Gender: Male  Race: W  Case Physician(s):  JARVIS Stein M.D.  Fellow:  Miky Levy M.D.  Referring Physician:  INDICATIONS: Initial STEMI.  HISTORY: The patient has a history of coronary artery disease.The patient  has hypertension and medication-treated dyslipidemia.  PROCEDURE:  --  Left heart catheterization with ventriculography.  --  Sonosite - Diagnostic.  --  IABP Insertion.  --  Intervention on proximal LAD: drug-eluting stent.  TECHNIQUE: The risks and alternatives of the procedures and conscious  sedation were explained to the patient and informed consent was obtained.  Cardiac catheterization performed urgently. Coronary intervention  performed urgently.  Local anesthetic given. Right radial artery access. Right femoral artery  access. Left heart catheterization. Ventriculography was performed.  Sonosite - Diagnostic. IABP Insertion. RADIATION EXPOSURE: 11.4 min. A  successful drug-eluting stent was performed on the 100 % lesion in the  proximal LAD. Following intervention there was a 1 % residual stenosis.  According to the ACC/AHA classification system, this lesion was a type C  lesion. There was JOSE MANUEL 0 flow before the procedure and JOSE MANUEL 3 flow after  the procedure. Vessel setup was performed. A 6FR JL3.5 LAUNCHER guiding  catheter was used to intubate the vessel. Vessel setup was performed. A  BMW UNIVERSAL 190CM wire was used to cross the lesion. Balloon angioplasty  was performed, using a 2.0 X 20 EUPHORA balloon, with 2 inflations and a  maximum inflation pressure of 10 benjamín. A 2.75 X 26 ELLIE drug-eluting stent  was placed across the lesion and deployed at a maximum inflation pressure  of 20 benjamín.  CONTRAST GIVEN: Omnipaque 79 ml. Omnipaque 111 ml.  MEDICATIONS GIVEN: Midazolam, 1 mg, IV. Fentanyl, 25 mcg, IV. Verapamil  (Isoptin, Calan, Covera), 2.5 mg, IA. Dopamine, infusion rate of 5  mcg/kg/min, IV. Heparin, 3000 units, IA. Heparin, 3000 units, IV.  VENTRICLES: EF estimated was 25 %.  CORONARY VESSELS: Thecoronary circulation is right dominant.  LM:   --  LM: Angiography showed minor luminal irregularities with no flow  limiting lesions.  LAD:   --  Proximal LAD: There was a 100 % stenosis.  --  D1: There was a 90 % stenosis.  COMPLICATIONS: There were no complications.  DIAGNOSTIC RECOMMENDATIONS: ASA and Plavix for 1 year.  INTERVENTIONAL RECOMMENDATIONS: ASA and Plavix for 1 year.  Prepared and signed by  Cole Rees M.D.  Signed 2018 12:40:09  HEMODYNAMIC TABLES  Pressures:  Baseline  Pressures:  - HR: 97  Pressures:  - Rhythm:  Pressures:  -- Aortic Pressure (S/D/M): 107/70/81  Pressures:  -- Left Ventricle (s/edp): 108/28/--  Pressures:  Intervention  Pressures:  - HR: 85  Pressures:  - Rhythm:  Pressures:  -- Aortic Pressure (S/D/M): 79/57/67  Outputs:  Baseline  Outputs:  -- CALCULATIONS[age]  Outputs:  -- CALCULATIONS: Body Surface Area: 1.76  Outputs:  -- CALCULATIONS: Height in cm: 170.00  Outputs:  -- CALCULATIONS: Sex: Male  Outputs:  -- CALCULATIONS: Weight in k.80    < end of copied text >  Outputs:  -- CALCULATIONS:Sex: Male  Outputs:  -- CALCULATIONS: Weight in k.00    < end of copied text >      ECHO:  < from: Limited Transthoracic Echo (18 @ 06:23) >    Patient name: LUNA COOPER  YOB: 1940   Age: 77 (M)   MR#: [id number]  Study Date: 2018  Location: [place] [id number]: Em Horton RDCS  Study quality: Technically fair  Referring Physician: Roseanne French MD  Blood Pressure: 83/57 mmHg  Height: 170 cm  Weight: 61 kg  BSA: 1.7 m2  ------------------------------------------------------------------------  PROCEDURE: Limited transthoracic echocardiogram with 2-D.  M-Mode and spectral and color flow Doppler.  INDICATION: Pericardial effusion (noninflammatory) (I31.3)  ------------------------------------------------------------------------  Observations:  Left Ventricle: Endocardium not well visualized; severe  segmental left ventricular systolic dysfunction.  Right Heart: Normal right atrium. Normal right ventricular  size and function. Normal tricuspid valve. Mild tricuspid  regurgitation.  Pericardium/Pleura: Small-moderate pericardial effusion  inferior to the right ventricle. The effusion measures up  to approximately 1.4 cm inferior to the tricuspid annulus  at its largest dimension. Fibrinous material is seen within  the pericardium. There is early diastolic inversion of the  mid right ventricle and variation of mitral inflow of  approximately 40%. Abnormal septal bounce is present which  could represent intraventricular dependence. The IVC is  nondilated (about 1.7 cm) and collapses with inspiration  consistent with normal RA pressure. Findings are consistent  with echocardiographic evidence of increasedpericardial  pressure/early pericardial tamponade physiology.  Left pleural effusion.  Hemodynamic: Estimated right atrial pressure is 8 mm Hg.  Estimated right ventricular systolic pressure equals 32 mm  Hg, assuming right atrial pressure equals 8 mm Hg,  < from: Transthoracic Echocardiogram (18 @ 08:26) >  Patient name: LUNA COOPER  YOB: 1940   Age: 77 (M)   MR#: [id number]  Study Date: 2018  Location: [place] [id number]: Em Horton RDCS  Study quality: Technically fair  Referring Physician: Roseanne French MD  Blood Pressure: 104/72 mmHg  Height: 170 cm  Weight: 62 kg  BSA: 1.7 m2  ------------------------------------------------------------------------  PROCEDURE: Transthoracic echocardiogram with 2-D, M-Mode  and complete spectral and color flow Doppler.  INDICATION: Chest pain, unspecified (R07.9)  ------------------------------------------------------------------------  Observations:  Aortic Valve/Aorta:  Left Ventricle: Severe segmental left ventricular systolic  dysfunction.  Pericardium/Pleura: Moderate to large pericardial effusion  seen adjacent to the right ventricle. Impaired RV diastolic  filling. Fibrinous material is seen within the effusion.  Left pleural effusion.  Hemodynamic: Estimated right atrial pressure is 8 mm Hg.  ------------------------------------------------------------------------  Conclusions:  1. Severe segmental left ventricular systolic dysfunction.  2. Moderate to large pericardial effusion seen adjacent to  the right atrium- right ventricle junction in the subcostal  view. Small pericardial effusion superior to the right  atrium and adjacent to the right ventricle in the subcostal  view. Impaired RV diastolic filling. Fibrinous material is  seen within the effusion.  3. Left pleural effusion.  *** Compared with echocardiogram of 2018, transmitral  respirophasic variation is not seen.  ------------------------------------------------------------------------  Confirmed on  2018 - 10:08:20 by Ross Vu M.D.  ------------------------------------------------------------------------    < end of copied text >  consistent with normal pulmonary pressures.  ------------------------------------------------------------------------  Conclusions:  Limited TTE to assess the pericardium.  1. Small-moderate pericardial effusion inferior to the  right ventricle. The effusion measures up to approximately  1.4 cm inferior to the tricuspid annulus at its largest  dimension. Fibrinous material is seen within the  pericardium. There is early diastolic inversion of the mid  right ventricle and variation of mitralinflow of  approximately 40%. Abnormal septal bounce is present which  could represent intraventricular dependence. The IVC is  nondilated (about 1.7 cm) and collapses with inspiration  consistent with normal RA pressure. Findings are consistent  with echocardiographic evidence of increased pericardial  pressure/early pericardial tamponade physiology.  2. Left pleural effusion.  *** Compared with echocardiogram of 2018, the  pericardial effusion is smaller on today's study.  ------------------------------------------------------------------------  Confirmed on  2018 - 11:12:42 by Billy Espinoza M.D.    < end of copied text >  < from: Transthoracic Echocardiogram (18 @ 06:48) >    Patient name: LUNA COOPER  YOB: 1940   Age: 77 (M)   MR#: [id number]  Study Date: 2018  Location: [address] [place], [place] [id number]: Alicia Everett MOSES  Study quality: Technically good  Referring Physician: Romulo Mazariegos MD  Blood Pressure: 98/64 mmHg  Height: 170 cm  Weight: 67 kg  BSA: 1.8 m2  ------------------------------------------------------------------------  PROCEDURE: Transthoracic echocardiogram with 2-D, M-Mode  and complete spectral and color flow Doppler.  INDICATION: Atherosclerotic heart disease of native  coronary artery without angina pectoris (I25.10)  ------------------------------------------------------------------------  Dimensions:    Normal Values:  LA:     3.9    2.0 - 4.0 cm  Ao:     2.9    2.0 - 3.8 cm  SEPTUM: 1.2    0.6 - 1.2 cm  PWT:    1.2    0.6 - 1.1 cm  LVIDd:  4.6    3.0 - 5.6 cm  LVIDs:         1.8 - 4.0 cm  Derived variables:  LVMI: 115 g/m2  RWT: 0.52  EF (Visual Estimate): 15 %  Doppler Peak Velocity (m/sec): AoV=1.2  ------------------------------------------------------------------------  Observations:  Mitral Valve: Normal mitral valve.  Aortic Valve/Aorta: Normal trileaflet aortic valve. Peak  transaortic valve gradient equals 6 mm Hg. Peak left  ventricular outflow tract gradient equals 4 mm Hg, mean  gradient is equal to 2 mm Hg, LVOT velocity time integral  equals 15 cm.  Aortic Root: 2.9 cm.  Left Atrium: Normal left atrium.  Left Ventricle: Severe segmental left ventricular systolic  dysfunction. The inferolateral wall is severely  hypokinetic/akinetic.  Large apical aneurysm.  The mid to  apical septum and anterior wall are akintic.   Endocardial  visualization enhanced with intravenous injection of echo  contrast (Definity). No left ventricular thrombus. Left  ventricular enlargement. Normal diastolic function  Right Heart: Normal right atrium.  Inferior vena cava size  variation 50%.  2.1 cm to 1 cm. There is decreased  diastolic filling of the right ventricle. Normal tricuspid  valve. Mild tricuspid regurgitation. Normal pulmonic valve.  Pericardium/Pleura: Moderate to large anterior pericardial  effusion lateral to the right ventricle.   The pericardial  effusion measures 0.8 cm adjacent to the right ventricle as  seen in the subcostal view.  Fibrinous material is seen  within the pericardial space.  There is greater than 40%  transmitral respirophasic flow varation which is sensitive  to but not specific flow pericardial tamponade.  Left pleural effusion.  Hemodynamic: Estimated right atrial pressure is 8 mm Hg.  Estimated right ventricular systolic pressure equals 33 mm  Hg, assuming right atrial pressure equals 8 mm Hg,  consistent with normal pulmonary pressures.  ------------------------------------------------------------------------  Conclusions:  1. Severe segmental left ventricular systolic dysfunction.  The inferolateral wall is severely hypokinetic/akinetic.  Large apical aneurysm.  The mid to apical septum and  anterior wall are akintic.   Endocardial visualization  enhanced with intravenous injection of echo contrast  (Definity). No left ventricular thrombus.  2. There is decreased diastolic filling of the right  ventricle.  3. Moderate to large anterior pericardial effusion lateral  to the right ventricle.   The pericardial effusion measures  0.8 cm adjacent to the right ventricle as seen in the  subcostal view.  Fibrinous material is seen within the  pericardial space.  There is greater than 40% transmitral  respirophasic flow varation which is sensitive to but not  specific flow pericardial tamponade.  4. Left pleural effusion.  Overall findings constent with elevated pericardial  pressure and early cardiac tamponade.  Case discussed with Dr. Ku  ------------------------------------------------------------------------  Confirmed on  2018 - 09:42:55 by Amol Greenfield M.D.  ------------------------------------------------------------------------    < end of copied text >      IMAGING:                           10.4   5.9   )-----------( 415      ( 08 May 2018 04:17 )             31.1     05-08    137  |  101  |  13  ----------------------------<  112<H>  4.1   |  27  |  0.84    Ca    8.2<L>      08 May 2018 04:16  Phos  3.0     05-08  Mg     2.0     05-08        PT/INR - ( 08 May 2018 04:17 )   PT: 17.0 sec;   INR: 1.56 ratio         PTT - ( 08 May 2018 04:17 )  PTT:30.5 sec      *BLOOD GAS/ARTERIAL/MIXED/VENOUS  *LACTATE      HEALTH ISSUES - PROBLEM Dx:  Chronic systolic heart failure: Chronic systolic heart failure  Systolic heart failure, unspecified HF chronicity: Systolic heart failure, unspecified HF chronicity  Left ventricular apical thrombus following MI: Left ventricular apical thrombus following MI  Pericardial effusion with cardiac tamponade: Pericardial effusion with cardiac tamponade  Preventive measure: Preventive measure  Coronary artery disease: Coronary artery disease  Leukocytosis: Leukocytosis  Supratherapeutic INR: Supratherapeutic INR  Systolic heart failure: Systolic heart failure  Dizziness: Dizziness        HEALTH ISSUES - R/O PROBLEM Dx:      DAISHA Nelson NP   #[id number]

## 2018-05-08 NOTE — PROGRESS NOTE ADULT - PROBLEM SELECTOR PROBLEM 1
Coronary artery disease
Coronary artery disease
Dizziness
Pericardial effusion with cardiac tamponade

## 2018-05-08 NOTE — DISCHARGE NOTE ADULT - CARE PLAN
Principal Discharge DX:	Pericardial effusion with cardiac tamponade  Goal:	Reabsorption of pericardial effusion  Assessment and plan of treatment:	Monitor heart rate, breathing and Blood Pressures daily.  Please see your doctor for sensations of palpitation, dizziness or shortness of breathe.  Follow up with the Cardiac Clinic in 1 week for medication adjustment.  Follow up with the cardiac clinic for follow up Transthoracic Echocardiogram for resolution of Pericardial effusion.  Secondary Diagnosis:	Coronary artery disease  Goal:	Pt remains chest pain free and understands post cath discharge instructions  Assessment and plan of treatment:	Continue your medications. Do not stop Aspirin or Plavix unless instructed by your cardiologist.  No heavy lifting, strenuous activity, bending, straining or unnecessary stair climbing for 2 weeks. No sex for 1 week.  No driving for 2 days. You may shower 24 hours following procedure but avoid baths and swimming for 1 week. Check groin site for bleeding and/or swelling daily following procedure. Call your doctor/cardiologist immediately for bleeding or swelling or if you have increased/persistent pain or drainage at the site. Follow up with your cardiologist in 1- 2 weeks. You may call [facility] at [phone] or [phone] after office hours and weekends with any questions or concerns following your procedure.

## 2018-05-08 NOTE — PROGRESS NOTE ADULT - PROBLEM SELECTOR PROBLEM 3
Left ventricular apical thrombus following MI
Supratherapeutic INR
Dizziness
Supratherapeutic INR

## 2018-05-08 NOTE — DISCHARGE NOTE ADULT - CARE PROVIDERS DIRECT ADDRESSES
[email or web address]

## 2018-05-08 NOTE — DISCHARGE NOTE ADULT - PLAN OF CARE
Reabsorption of pericardial effusion Monitor heart rate, breathing and Blood Pressures daily.  Please see your doctor for sensations of palpitation, dizziness or shortness of breathe.  Follow up with the Cardiac Clinic in 1 week for medication adjustment.  Follow up with the cardiac clinic for follow up Transthoracic Echocardiogram for resolution of Pericardial effusion. Pt remains chest pain free and understands post cath discharge instructions Continue your medications. Do not stop Aspirin or Plavix unless instructed by your cardiologist.  No heavy lifting, strenuous activity, bending, straining or unnecessary stair climbing for 2 weeks. No sex for 1 week.  No driving for 2 days. You may shower 24 hours following procedure but avoid baths and swimming for 1 week. Check groin site for bleeding and/or swelling daily following procedure. Call your doctor/cardiologist immediately for bleeding or swelling or if you have increased/persistent pain or drainage at the site. Follow up with your cardiologist in 1- 2 weeks. You may call [facility] at [phone] or [phone] after office hours and weekends with any questions or concerns following your procedure.

## 2018-05-08 NOTE — DISCHARGE NOTE ADULT - CARE PROVIDER_API CALL
Cole Rees (MD), Cardiovascular Disease; Interventional Cardiology  [address] [place] [id number]  Phone: [phone]  Fax: [phone]    Juan Nick (MD; MPH), Adv Heart Fail Trnsplnt Cardio; Cardiology  [address] [place] [address]  Phone: [phone]  Fax: [phone]

## 2018-05-08 NOTE — PROGRESS NOTE ADULT - ATTENDING COMMENTS
77 man w/ no prior medical history who presented on 4/18 w/ CP and was found to have an anterior wall STEMI (sxs started preceding day) and underwent PCI to pLAD (w/ IABP); subsequently had a staged PCI to D1. TTE showed EF 20-25% with LV thrombus and was started on anticoagulation. Was discharged 4/26 but then represented on 5/2 when was seen in medicine clinic appt tachyardic and hypotensive. Per patient he has been compliant with medications and been wearing LifeVest. States he gets dizzy at times but otherwise denies PND/LE edema. Was discharged on lisinopril 2.5 mg daily, ASA, plavix, coumadin, toprol XL 25 mg, and lipitor. Was given lasix yesterday as JVD was elevated slightly. TTE repeat showed EF 15%, apical aneurysm (no LV thrombus) LVEDD 4.6 cm, mod-large anterior pericardial effusion lateral to RV, IVC collapsing (not plethoric) but has some RV diastolic inversion and transmitral respiratory variation that is concerning for early tamponade. On exam, JVD approx 8 cm with possible Kussmaul, RRR, no S3 audible, clear lungs, no pedal edema, lukewarm. Labs reviewed WBC 11.8, Hb 11.0, BUN/Cr 17/0.9, INR 7.5 (uptrending). EKG 5/2 NSR, nl axis, MONICA V1-V5. Currently appears stage C HF, NYHA class II with finding of early tamponade on echo in setitng of supratheraputic INR  - would hold diuretics; received 250 cc bolus  - continue lisinopril 2.5 and toprol XL 25 mg daily for now  - standing weights daily  - repeat TTE in 48 hours or if clinical situation changes  - continue antiplatelet therapy and lipitor  - hold AC until INR trending down; give 5 mg vitamin K po  - discussed fluid restriction/sodium restriction and importance of checking weights  - may not be amenable to drainage but would see if necessary pending clinical status once INR improved
No respirophasic variation of MV flow on today's echo.  Continue to hold diuretics.
Patient interviewed and examined.  Chart reviewed and note edited where appropriate.  Case discussed with fellow.  Agree w/ Assessment and Plan as outlined.    Len Butler MD [facility]  Spectra:  [phone]  Office: [phone]
Patient is seen and examined with fellow, NP and the CCU house-staff. I agree with the history, physical and the assessment and plan.  recent TTE did no visualize the LV thrombus  would hold heparin and would not dose the coumadin   repeat TTE done today  on DAPT  tolerating BB and low dose lisinopril
Patient is seen and examined with fellow, NP and the CCU house-staff. I agree with the history, physical and the assessment and plan.  no need for coumadin  TTE results noted  hemodynamically stable
Clinically stable without evidence of overt tamponade on exam. I would continue to hold diuretics and continue current dose of Toprol XL and lisinopril. I do not see an indication for a RHC at this time.     Please call me with questions at [phone].
I interviewed and examined Mr. Ortiz.  His case was discussed with CHIDI Trivedi.
I interviewed and examined Mr. Ortiz.  His case was discussed with CHIDI Euceda.
Agree with above.  Patient not as dizzy, but still with complaint, still with HR in 's sinus tachycardia.  Given peristent dizziness, known LV thrombus, will obtain CT head to r/o stroke as cause of symtpoms.  Even if negative, will likely obtain MRI given persistent dizziness concern for cerebellar lesion and poor imaging of cerebellum on CT.   -Heart failure management as per Cardiology.   -

## 2018-05-08 NOTE — DISCHARGE NOTE ADULT - MEDICATION SUMMARY - MEDICATIONS TO STOP TAKING
I will STOP taking the medications listed below when I get home from the hospital:    Coumadin 5 mg oral tablet  -- 1 tab(s) by mouth once a day (at bedtime)   Follow up with your PMD or cardiologist for INR check within 1 week  -- Do not take this drug if you are pregnant.  It is very important that you take or use this exactly as directed.  Do not skip doses or discontinue unless directed by your doctor.  Obtain medical advice before taking any non-prescription drugs as some may affect the action of this medication.

## 2018-05-08 NOTE — PROGRESS NOTE ADULT - PROBLEM SELECTOR PLAN 1
- Stable at current, still does not feel like ambulation is normal; check PT eval  - continue to hold diuretics

## 2018-05-08 NOTE — PROGRESS NOTE ADULT - ASSESSMENT
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 He presented with suprathereputic INR and development of pericardial effusion, which was likely hemorrhagic. He currently is hemodynamically stable and euvolemic, but is relatively hypotensive and tachycardic.

## 2018-05-08 NOTE — DISCHARGE NOTE ADULT - ADDITIONAL INSTRUCTIONS
Please follow up with the Cardiac Clinic with in 10-14 days of discharge from the hospital for follow up echocardiogram.

## 2018-05-08 NOTE — CHART NOTE - NSCHARTNOTEFT_GEN_A_CORE
====================  CCU MIDNIGHT ROUNDS  ====================    LUNA COOPER  [id number]    ====================  SUMMARY:   76yo M PMH recently diagnosed CAD s/p 2DES (pLAD, D1) on 4/18 and 4/19 c/b LV thrombus now on coumadin, HFpEF (EF 20%) who was sent home with life vest now presenting from INR clinic with elevated INR 7.3, hypotension (SBP 80s) and tachycardia (110s). Patient reporting dizziness and lightheadedness since discharge from hospital on 4/26. In ED, Afebrile, HR 110s, -110/70-80, RR 18, 98% RA. EKG performed in ED (cannot be found in paper chart but per ED note, was interpreted as sinus tachycardia , MONICA V1-V5 (more pronounced than prior). Labs significant for leukocytosis WBC 14.4 and INR 7.29. CXR with clear lungs. Cardiology consulted. (02 May 2018 17:18) Pt found to have large pericardial effusion without e/o tamponade w/ no e/o LV thrombus.   ====================    ====================  NEW EVENTS: No acute events. PT ordered  ====================    ====================  VITALS (Last 12 hrs):  ====================    T(C): 36.7 (05-07-18 @ 15:00), Max: 36.7 (05-07-18 @ 15:00)  HR: 93 (05-07-18 @ 23:00) (90 - 107)  BP: 81/57 (05-07-18 @ 23:00) (80/61 - 96/64)  BP(mean): 66 (05-07-18 @ 23:00) (66 - 77)  RR: 16 (05-07-18 @ 23:00) (16 - 18)  SpO2: 96% (05-07-18 @ 19:00) (96% - 99%)    TELEMETRY: NSR 90    I&O's Summary    06 May 2018 07:01  -  07 May 2018 07:00  --------------------------------------------------------  IN: 1079.5 mL / OUT: 600 mL / NET: 479.5 mL    07 May 2018 07:01  -  08 May 2018 01:01  --------------------------------------------------------  IN: 420 mL / OUT: 0 mL / NET: 420 mL    ====================  PLAN:  ====================  AdHF: No e/o fluid overload or low output state. Continue lisinopril and toprol  Pericardial effusion: decreasing in size without e/o tamponade        Ana Cristina Soni, DAISHA NP   # ====================  CCU MIDNIGHT ROUNDS  ====================    LUNA COOPER  [id number]    ====================  SUMMARY:   76yo M PMH recently diagnosed CAD s/p 2DES (pLAD, D1) on 4/18 and 4/19 c/b LV thrombus now on coumadin, HFpEF (EF 20%) who was sent home with life vest now presenting from INR clinic with elevated INR 7.3, hypotension (SBP 80s) and tachycardia (110s). Patient reporting dizziness and lightheadedness since discharge from hospital on 4/26. In ED, Afebrile, HR 110s, -110/70-80, RR 18, 98% RA. EKG performed in ED (cannot be found in paper chart but per ED note, was interpreted as sinus tachycardia , MONICA V1-V5 (more pronounced than prior). Labs significant for leukocytosis WBC 14.4 and INR 7.29. CXR with clear lungs. Cardiology consulted. (02 May 2018 17:18) Pt found to have large pericardial effusion without e/o tamponade w/ no e/o LV thrombus.   ====================    ====================  NEW EVENTS: No acute events. PT ordered  ====================    ====================  VITALS (Last 12 hrs):  ====================    T(C): 36.7 (05-07-18 @ 15:00), Max: 36.7 (05-07-18 @ 15:00)  HR: 93 (05-07-18 @ 23:00) (90 - 107)  BP: 81/57 (05-07-18 @ 23:00) (80/61 - 96/64)  BP(mean): 66 (05-07-18 @ 23:00) (66 - 77)  RR: 16 (05-07-18 @ 23:00) (16 - 18)  SpO2: 96% (05-07-18 @ 19:00) (96% - 99%)    TELEMETRY: NSR 90    I&O's Summary    06 May 2018 07:01  -  07 May 2018 07:00  --------------------------------------------------------  IN: 1079.5 mL / OUT: 600 mL / NET: 479.5 mL    07 May 2018 07:01  -  08 May 2018 01:01  --------------------------------------------------------  IN: 420 mL / OUT: 0 mL / NET: 420 mL    ====================  PLAN:  ====================  AdHF: No e/o fluid overload or low output state. Continue lisinopril and toprol  Pericardial effusion: decreasing in size without e/o tamponade  LV thrombus: No LV thrombus noted on echos. Will hold AC  Deconditioning from prolonged bedrest: PT ordered. encourage ambulation        Ana Cristina Soni, CCU NP   #[id number]

## 2018-05-10 PROBLEM — I25.10 ATHEROSCLEROTIC HEART DISEASE OF NATIVE CORONARY ARTERY WITHOUT ANGINA PECTORIS: Chronic | Status: ACTIVE | Noted: 2018-05-02

## 2018-05-11 ENCOUNTER — APPOINTMENT (OUTPATIENT)
Dept: INTERNAL MEDICINE | Facility: CLINIC | Age: 78
End: 2018-05-11

## 2018-05-23 PROCEDURE — 86850 RBC ANTIBODY SCREEN: CPT

## 2018-05-23 PROCEDURE — 85610 PROTHROMBIN TIME: CPT

## 2018-05-23 PROCEDURE — 93308 TTE F-UP OR LMTD: CPT

## 2018-05-23 PROCEDURE — 84484 ASSAY OF TROPONIN QUANT: CPT

## 2018-05-23 PROCEDURE — 82435 ASSAY OF BLOOD CHLORIDE: CPT

## 2018-05-23 PROCEDURE — 93321 DOPPLER ECHO F-UP/LMTD STD: CPT

## 2018-05-23 PROCEDURE — 82947 ASSAY GLUCOSE BLOOD QUANT: CPT

## 2018-05-23 PROCEDURE — 82962 GLUCOSE BLOOD TEST: CPT

## 2018-05-23 PROCEDURE — 93306 TTE W/DOPPLER COMPLETE: CPT

## 2018-05-23 PROCEDURE — 93005 ELECTROCARDIOGRAM TRACING: CPT | Mod: 76

## 2018-05-23 PROCEDURE — 82553 CREATINE MB FRACTION: CPT

## 2018-05-23 PROCEDURE — 85730 THROMBOPLASTIN TIME PARTIAL: CPT

## 2018-05-23 PROCEDURE — 70450 CT HEAD/BRAIN W/O DYE: CPT

## 2018-05-23 PROCEDURE — 86901 BLOOD TYPING SEROLOGIC RH(D): CPT

## 2018-05-23 PROCEDURE — 85014 HEMATOCRIT: CPT

## 2018-05-23 PROCEDURE — 82803 BLOOD GASES ANY COMBINATION: CPT

## 2018-05-23 PROCEDURE — 82550 ASSAY OF CK (CPK): CPT

## 2018-05-23 PROCEDURE — 84295 ASSAY OF SERUM SODIUM: CPT

## 2018-05-23 PROCEDURE — 71045 X-RAY EXAM CHEST 1 VIEW: CPT

## 2018-05-23 PROCEDURE — 99285 EMERGENCY DEPT VISIT HI MDM: CPT

## 2018-05-23 PROCEDURE — 85027 COMPLETE CBC AUTOMATED: CPT

## 2018-05-23 PROCEDURE — 83735 ASSAY OF MAGNESIUM: CPT

## 2018-05-23 PROCEDURE — 80053 COMPREHEN METABOLIC PANEL: CPT

## 2018-05-23 PROCEDURE — 83605 ASSAY OF LACTIC ACID: CPT

## 2018-05-23 PROCEDURE — 84132 ASSAY OF SERUM POTASSIUM: CPT

## 2018-05-23 PROCEDURE — 86900 BLOOD TYPING SEROLOGIC ABO: CPT

## 2018-05-23 PROCEDURE — 80048 BASIC METABOLIC PNL TOTAL CA: CPT

## 2018-05-23 PROCEDURE — 84100 ASSAY OF PHOSPHORUS: CPT

## 2018-05-23 PROCEDURE — 82330 ASSAY OF CALCIUM: CPT

## 2018-05-24 ENCOUNTER — OUTPATIENT (OUTPATIENT)
Dept: OUTPATIENT SERVICES | Facility: HOSPITAL | Age: 78
LOS: 1 days | End: 2018-05-24
Payer: MEDICAID

## 2018-05-24 ENCOUNTER — APPOINTMENT (OUTPATIENT)
Dept: CARDIOLOGY | Facility: HOSPITAL | Age: 78
End: 2018-05-24

## 2018-05-24 VITALS
WEIGHT: 138 LBS | DIASTOLIC BLOOD PRESSURE: 81 MMHG | SYSTOLIC BLOOD PRESSURE: 119 MMHG | HEART RATE: 100 BPM | BODY MASS INDEX: 21.66 KG/M2 | HEIGHT: 67 IN

## 2018-05-24 DIAGNOSIS — Z98.890 OTHER SPECIFIED POSTPROCEDURAL STATES: Chronic | ICD-10-CM

## 2018-05-24 DIAGNOSIS — I23.6 THROMBOSIS OF ATRIUM, AURICULAR APPENDAGE, AND VENTRICLE AS CURRENT COMPLICATIONS FOLLOWING ACUTE MYOCARDIAL INFARCTION: ICD-10-CM

## 2018-05-24 DIAGNOSIS — I25.10 ATHEROSCLEROTIC HEART DISEASE OF NATIVE CORONARY ARTERY WITHOUT ANGINA PECTORIS: ICD-10-CM

## 2018-05-24 DIAGNOSIS — Z86.79 PERSONAL HISTORY OF OTHER DISEASES OF THE CIRCULATORY SYSTEM: ICD-10-CM

## 2018-05-24 DIAGNOSIS — R42 DIZZINESS AND GIDDINESS: ICD-10-CM

## 2018-05-24 DIAGNOSIS — Z87.891 PERSONAL HISTORY OF NICOTINE DEPENDENCE: ICD-10-CM

## 2018-05-24 DIAGNOSIS — I25.5 ISCHEMIC CARDIOMYOPATHY: ICD-10-CM

## 2018-05-24 DIAGNOSIS — I31.3 PERICARDIAL EFFUSION (NONINFLAMMATORY): ICD-10-CM

## 2018-06-04 ENCOUNTER — APPOINTMENT (OUTPATIENT)
Dept: ELECTROPHYSIOLOGY | Facility: CLINIC | Age: 78
End: 2018-06-04

## 2018-06-21 ENCOUNTER — MEDICATION RENEWAL (OUTPATIENT)
Age: 78
End: 2018-06-21

## 2018-06-21 ENCOUNTER — OUTPATIENT (OUTPATIENT)
Dept: OUTPATIENT SERVICES | Facility: HOSPITAL | Age: 78
LOS: 1 days | End: 2018-06-21
Payer: MEDICAID

## 2018-06-21 ENCOUNTER — APPOINTMENT (OUTPATIENT)
Dept: CV DIAGNOSITCS | Facility: HOSPITAL | Age: 78
End: 2018-06-21

## 2018-06-21 DIAGNOSIS — Z98.890 OTHER SPECIFIED POSTPROCEDURAL STATES: Chronic | ICD-10-CM

## 2018-06-21 DIAGNOSIS — I25.10 ATHEROSCLEROTIC HEART DISEASE OF NATIVE CORONARY ARTERY WITHOUT ANGINA PECTORIS: ICD-10-CM

## 2018-06-21 PROCEDURE — 93306 TTE W/DOPPLER COMPLETE: CPT | Mod: 26

## 2018-06-21 PROCEDURE — G0463: CPT

## 2018-06-21 PROCEDURE — C8929: CPT

## 2018-06-21 RX ORDER — METOPROLOL SUCCINATE 25 MG/1
25 TABLET, EXTENDED RELEASE ORAL DAILY
Qty: 90 | Refills: 1 | Status: ACTIVE | COMMUNITY
Start: 2018-05-24 | End: 1900-01-01

## 2018-06-21 RX ORDER — ATORVASTATIN CALCIUM 80 MG/1
80 TABLET, FILM COATED ORAL
Qty: 90 | Refills: 1 | Status: ACTIVE | COMMUNITY
Start: 2018-05-24 | End: 1900-01-01

## 2018-06-21 RX ORDER — CLOPIDOGREL BISULFATE 75 MG/1
75 TABLET, FILM COATED ORAL DAILY
Qty: 90 | Refills: 1 | Status: ACTIVE | COMMUNITY
Start: 1900-01-01 | End: 1900-01-01

## 2018-06-21 RX ORDER — ASPIRIN 81 MG/1
81 TABLET, CHEWABLE ORAL
Qty: 90 | Refills: 1 | Status: ACTIVE | COMMUNITY
Start: 2018-05-24 | End: 1900-01-01

## 2018-06-21 RX ORDER — LISINOPRIL 2.5 MG/1
2.5 TABLET ORAL DAILY
Qty: 90 | Refills: 1 | Status: ACTIVE | COMMUNITY
Start: 2018-05-24 | End: 1900-01-01

## 2018-07-05 ENCOUNTER — APPOINTMENT (OUTPATIENT)
Dept: INTERNAL MEDICINE | Facility: CLINIC | Age: 78
End: 2018-07-05

## 2018-07-23 ENCOUNTER — APPOINTMENT (OUTPATIENT)
Dept: CV DIAGNOSITCS | Facility: HOSPITAL | Age: 78
End: 2018-07-23

## 2018-07-23 ENCOUNTER — APPOINTMENT (OUTPATIENT)
Dept: CARDIOLOGY | Facility: CLINIC | Age: 78
End: 2018-07-23

## 2019-05-28 ENCOUNTER — RX RENEWAL (OUTPATIENT)
Age: 79
End: 2019-05-28

## 2019-07-02 ENCOUNTER — RX RENEWAL (OUTPATIENT)
Age: 79
End: 2019-07-02

## 2020-07-21 NOTE — H&P ADULT - ASSESSMENT
jorje 78 yo M no pmhx presents with chest pain at OSH transferred to [facility] in setting of STEMI s/p KYRA prox LAD 99%, with D1 and ramus 90% w/ IABP placed at cath lab for hypotension. Presents to the floor with possible acute decompensated heart failure (LVEDP 28 and estimated EF 25%) and persistent chest pain.     # Anterior STEMI s/p KYRA pLAD with remaining D1 and ramus 90%  - Continue ASA, was brilinta loaded and plavix loaded today  - Continue brillinta 90 BID starting tomorrow AM    # Acute decompensated heart failure: satting 94% on 2 L NC  - LVEDP 25% and estimated EF 25%  - S/p lasix 20  - Continue to monitor I/Os closely, keep net negative 1 L 78 yo M no pmhx presents with chest pain at OSH transferred to [facility] in setting of STEMI s/p KYRA prox LAD 99%, with D1 and ramus 90% w/ IABP placed at cath lab for hypotension. Presents to the floor with possible acute decompensated heart failure (LVEDP 28 and estimated EF 25%) and persistent chest pain.     # Anterior STEMI s/p KYRA pLAD with remaining D1 and ramus 90%  - Continue ASA, was brilinta loaded and plavix loaded today  - Continue brillinta 90 BID starting tomorrow AM  - Possible staged PCI tomorrow  - Continue to trend Nuha  - Continue heparin and IABP  - Continue nitro gtt and ISDN 10 TID for anginal pain    # Acute decompensated heart failure: satting 94% on 2 L NC  - LVEDP 25% and estimated EF 25%  - S/p lasix 20  - Continue to monitor I/Os closely, keep net negative 1 L     # Leukocytosis:   - Likely elevated in setting of stress response. No evidence of fever.     # Elevated LFTs:  - Likely in setting of STEMI, continue to trend and monitor    Lizabeth Barnett MD  Internal Medicine, PGY-2  Pager [phone]

## 2023-10-31 NOTE — DIETITIAN INITIAL EVALUATION ADULT. - PERTINENT MEDS FT
POST-OP DIAGNOSIS:  Ankle wound, right, subsequent encounter 31-Oct-2023 15:42:31  Angel Echevarria  Open toe wound 31-Oct-2023 15:42:47  Angel Echevarria   Coumadin, Potassium Chloride, K-Phos No.2, Lipitor

## 2024-04-25 ENCOUNTER — EMERGENCY (EMERGENCY)
Facility: HOSPITAL | Age: 84
LOS: 1 days | End: 2024-04-25
Attending: EMERGENCY MEDICINE
Payer: MEDICARE

## 2024-04-25 VITALS
OXYGEN SATURATION: 99 % | SYSTOLIC BLOOD PRESSURE: 100 MMHG | RESPIRATION RATE: 16 BRPM | DIASTOLIC BLOOD PRESSURE: 46 MMHG | HEART RATE: 121 BPM

## 2024-04-25 VITALS — HEIGHT: 67 IN | WEIGHT: 149.69 LBS

## 2024-04-25 DIAGNOSIS — Z98.890 OTHER SPECIFIED POSTPROCEDURAL STATES: Chronic | ICD-10-CM

## 2024-04-25 LAB
ALBUMIN SERPL ELPH-MCNC: 2.4 G/DL — LOW (ref 3.5–5)
ALP SERPL-CCNC: 88 U/L — SIGNIFICANT CHANGE UP (ref 40–120)
ALT FLD-CCNC: 348 U/L DA — HIGH (ref 10–60)
ANION GAP SERPL CALC-SCNC: 14 MMOL/L — SIGNIFICANT CHANGE UP (ref 5–17)
APTT BLD: 36.9 SEC — HIGH (ref 24.5–35.6)
AST SERPL-CCNC: 321 U/L — HIGH (ref 10–40)
BASOPHILS # BLD AUTO: 0.08 K/UL — SIGNIFICANT CHANGE UP (ref 0–0.2)
BASOPHILS NFR BLD AUTO: 1 % — SIGNIFICANT CHANGE UP (ref 0–2)
BILIRUB SERPL-MCNC: 0.2 MG/DL — SIGNIFICANT CHANGE UP (ref 0.2–1.2)
BUN SERPL-MCNC: 20 MG/DL — HIGH (ref 7–18)
BURR CELLS BLD QL SMEAR: SIGNIFICANT CHANGE UP
CALCIUM SERPL-MCNC: 10.4 MG/DL — SIGNIFICANT CHANGE UP (ref 8.4–10.5)
CHLORIDE SERPL-SCNC: 108 MMOL/L — SIGNIFICANT CHANGE UP (ref 96–108)
CO2 SERPL-SCNC: 22 MMOL/L — SIGNIFICANT CHANGE UP (ref 22–31)
CREAT SERPL-MCNC: 1.51 MG/DL — HIGH (ref 0.5–1.3)
DACRYOCYTES BLD QL SMEAR: SLIGHT — SIGNIFICANT CHANGE UP
EGFR: 46 ML/MIN/1.73M2 — LOW
ELLIPTOCYTES BLD QL SMEAR: SLIGHT — SIGNIFICANT CHANGE UP
EOSINOPHIL # BLD AUTO: 1.01 K/UL — HIGH (ref 0–0.5)
EOSINOPHIL NFR BLD AUTO: 12 % — HIGH (ref 0–6)
ETHANOL SERPL-MCNC: <3 MG/DL — SIGNIFICANT CHANGE UP (ref 0–10)
FLUAV AG NPH QL: SIGNIFICANT CHANGE UP
FLUBV AG NPH QL: SIGNIFICANT CHANGE UP
GLUCOSE SERPL-MCNC: 289 MG/DL — HIGH (ref 70–99)
HCT VFR BLD CALC: 38.4 % — LOW (ref 39–50)
HGB BLD-MCNC: 11.7 G/DL — LOW (ref 13–17)
INR BLD: 1.21 RATIO — HIGH (ref 0.85–1.18)
LYMPHOCYTES # BLD AUTO: 4.03 K/UL — HIGH (ref 1–3.3)
LYMPHOCYTES # BLD AUTO: 48 % — HIGH (ref 13–44)
MACROCYTES BLD QL: SLIGHT — SIGNIFICANT CHANGE UP
MAGNESIUM SERPL-MCNC: 3.7 MG/DL — HIGH (ref 1.6–2.6)
MANUAL SMEAR VERIFICATION: SIGNIFICANT CHANGE UP
MCHC RBC-ENTMCNC: 30.5 GM/DL — LOW (ref 32–36)
MCHC RBC-ENTMCNC: 30.5 PG — SIGNIFICANT CHANGE UP (ref 27–34)
MCV RBC AUTO: 100.3 FL — HIGH (ref 80–100)
METAMYELOCYTES # FLD: 2 % — HIGH (ref 0–0)
MONOCYTES # BLD AUTO: 0.08 K/UL — SIGNIFICANT CHANGE UP (ref 0–0.9)
MONOCYTES NFR BLD AUTO: 1 % — LOW (ref 2–14)
MYELOCYTES NFR BLD: 1 % — HIGH (ref 0–0)
NEUTROPHILS # BLD AUTO: 2.77 K/UL — SIGNIFICANT CHANGE UP (ref 1.8–7.4)
NEUTROPHILS NFR BLD AUTO: 28 % — LOW (ref 43–77)
NEUTS BAND # BLD: 5 % — SIGNIFICANT CHANGE UP (ref 0–8)
NRBC # BLD: 0 /100 WBCS — SIGNIFICANT CHANGE UP (ref 0–0)
NT-PROBNP SERPL-SCNC: 636 PG/ML — HIGH (ref 0–450)
PHOSPHATE SERPL-MCNC: 7.8 MG/DL — HIGH (ref 2.5–4.5)
PLAT MORPH BLD: NORMAL — SIGNIFICANT CHANGE UP
PLATELET # BLD AUTO: 122 K/UL — LOW (ref 150–400)
POIKILOCYTOSIS BLD QL AUTO: SIGNIFICANT CHANGE UP
POTASSIUM SERPL-MCNC: 3.9 MMOL/L — SIGNIFICANT CHANGE UP (ref 3.5–5.3)
POTASSIUM SERPL-SCNC: 3.9 MMOL/L — SIGNIFICANT CHANGE UP (ref 3.5–5.3)
PROT SERPL-MCNC: 4.8 G/DL — LOW (ref 6–8.3)
PROTHROM AB SERPL-ACNC: 13.7 SEC — HIGH (ref 9.5–13)
RBC # BLD: 3.83 M/UL — LOW (ref 4.2–5.8)
RBC # FLD: 13.3 % — SIGNIFICANT CHANGE UP (ref 10.3–14.5)
RBC BLD AUTO: ABNORMAL
SARS-COV-2 RNA SPEC QL NAA+PROBE: SIGNIFICANT CHANGE UP
SCHISTOCYTES BLD QL AUTO: SLIGHT — SIGNIFICANT CHANGE UP
SODIUM SERPL-SCNC: 144 MMOL/L — SIGNIFICANT CHANGE UP (ref 135–145)
TROPONIN I, HIGH SENSITIVITY RESULT: 269.9 NG/L — HIGH
VARIANT LYMPHS # BLD: 2 % — SIGNIFICANT CHANGE UP (ref 0–6)
WBC # BLD: 8.4 K/UL — SIGNIFICANT CHANGE UP (ref 3.8–10.5)
WBC # FLD AUTO: 8.4 K/UL — SIGNIFICANT CHANGE UP (ref 3.8–10.5)

## 2024-04-25 PROCEDURE — 83880 ASSAY OF NATRIURETIC PEPTIDE: CPT

## 2024-04-25 PROCEDURE — 87040 BLOOD CULTURE FOR BACTERIA: CPT

## 2024-04-25 PROCEDURE — 31500 INSERT EMERGENCY AIRWAY: CPT

## 2024-04-25 PROCEDURE — 82962 GLUCOSE BLOOD TEST: CPT

## 2024-04-25 PROCEDURE — 36415 COLL VENOUS BLD VENIPUNCTURE: CPT

## 2024-04-25 PROCEDURE — 92950 HEART/LUNG RESUSCITATION CPR: CPT

## 2024-04-25 PROCEDURE — 84484 ASSAY OF TROPONIN QUANT: CPT

## 2024-04-25 PROCEDURE — 99291 CRITICAL CARE FIRST HOUR: CPT | Mod: 25

## 2024-04-25 PROCEDURE — 83735 ASSAY OF MAGNESIUM: CPT

## 2024-04-25 PROCEDURE — 80307 DRUG TEST PRSMV CHEM ANLYZR: CPT

## 2024-04-25 PROCEDURE — 85025 COMPLETE CBC W/AUTO DIFF WBC: CPT

## 2024-04-25 PROCEDURE — 94002 VENT MGMT INPAT INIT DAY: CPT

## 2024-04-25 PROCEDURE — 80053 COMPREHEN METABOLIC PANEL: CPT

## 2024-04-25 PROCEDURE — 87637 SARSCOV2&INF A&B&RSV AMP PRB: CPT

## 2024-04-25 PROCEDURE — 85610 PROTHROMBIN TIME: CPT

## 2024-04-25 PROCEDURE — 84100 ASSAY OF PHOSPHORUS: CPT

## 2024-04-25 PROCEDURE — 99285 EMERGENCY DEPT VISIT HI MDM: CPT | Mod: 25

## 2024-04-25 PROCEDURE — 85730 THROMBOPLASTIN TIME PARTIAL: CPT

## 2024-04-25 RX ORDER — CHLORHEXIDINE GLUCONATE 213 G/1000ML
15 SOLUTION TOPICAL EVERY 12 HOURS
Refills: 0 | Status: DISCONTINUED | OUTPATIENT
Start: 2024-04-25 | End: 2024-04-29

## 2024-04-25 RX ORDER — NOREPINEPHRINE BITARTRATE/D5W 8 MG/250ML
0.05 PLASTIC BAG, INJECTION (ML) INTRAVENOUS
Qty: 8 | Refills: 0 | Status: DISCONTINUED | OUTPATIENT
Start: 2024-04-25 | End: 2024-04-25

## 2024-04-25 RX ORDER — EPINEPHRINE 0.3 MG/.3ML
0.03 INJECTION INTRAMUSCULAR; SUBCUTANEOUS
Qty: 8 | Refills: 0 | Status: DISCONTINUED | OUTPATIENT
Start: 2024-04-25 | End: 2024-04-25

## 2024-04-25 NOTE — ED PROVIDER NOTE - OBJECTIVE STATEMENT
Patient brought in by EMS for cardiac arrest.  Collapsed in the kitchen of his listed emergency contact.  EMS arrived approximately 10 minutes after call and CPR started.  Per paramedics was initially PEA followed by V. tach followed by torsades followed by PEA.  Multiple medications given en route without ROSC.  Delmar tube in place for ventilation.

## 2024-04-25 NOTE — ED ADULT TRIAGE NOTE - CHIEF COMPLAINT QUOTE
Patient here as a notification for cardiac arrest. Patient was getting ready to cook and collapsed around 1745. Patient found in cardiac arrest, had 2 shocks. Patient received 8 epi, 300 Amiodarone, 88 Bicarb, and 2G Magnesium. Patient is intubated at the scene. IO left lower extremity. ACLS protocol in progress.

## 2024-04-25 NOTE — ED ADULT NURSE NOTE - NSFALLHARMRISKINTERV_ED_ALL_ED
Assistance OOB with selected safe patient handling equipment if applicable/Communicate risk of Fall with Harm to all staff, patient, and family/Provide patient with walking aids/Provide visual cue: red socks, yellow wristband, yellow gown, etc/Reinforce activity limits and safety measures with patient and family/Bed in lowest position, wheels locked, appropriate side rails in place/Call bell, personal items and telephone in reach/Instruct patient to call for assistance before getting out of bed/chair/stretcher/Non-slip footwear applied when patient is off stretcher/[place] to call system/Physically safe environment - no spills, clutter or unnecessary equipment/Purposeful Proactive Rounding/Room/bathroom lighting operational, light cord in reach

## 2024-04-25 NOTE — ED PROVIDER NOTE - PHYSICAL EXAMINATION
Exam:  General: Patient unresponsive  HEENT: edvin airway present  Cardiac: no spontaneous pulse  Respiratory: chest rise with bagging  GI: abdomen soft  Neuro: no spontaneous movement

## 2024-04-25 NOTE — ED ADULT NURSE NOTE - OBJECTIVE STATEMENT
notification for cardiac arrest. as per EMS Patient collapsed in the kitchen. on arrival Patient intubated with edvin tube ACLS protocol in progress.   ROSC obtained @ 6:53PM.   DR Jeff discussed POC with emergency contact Grace was present and patient in emergency department she requested palliative extubation.  MOLST form signed. Patient extubated placed on NRM. pronounced by DR Jeff @7:37PM.

## 2024-04-25 NOTE — ED PROVIDER NOTE - PROGRESS NOTE DETAILS
Only listed contact in system is emergency contact Grace who is present with patient in the emergency department.  She reports that he would not want to be kept alive by artificial methods.  Discussed containing current therapy with no escalation given ROSC versus palliative extubation.  She requested palliative extubation.  ASHVIN signed to this effect.  Will remove ET tube in emergency department.

## 2024-04-25 NOTE — ED PROVIDER NOTE - CLINICAL SUMMARY MEDICAL DECISION MAKING FREE TEXT BOX
ACLS continued in the emergency department.  ROSC obtained after 2 rounds of epinephrine 1 calcium and 1 bicarb.  Delmar tube converted to ET tube by myself.  Discussed goals of care with emergency contact was present and patient in emergency department and decision made to palliatively extubate.

## 2024-04-26 PROBLEM — I50.20 UNSPECIFIED SYSTOLIC (CONGESTIVE) HEART FAILURE: Chronic | Status: ACTIVE | Noted: 2018-05-02

## 2024-05-01 LAB
CULTURE RESULTS: SIGNIFICANT CHANGE UP
CULTURE RESULTS: SIGNIFICANT CHANGE UP
SPECIMEN SOURCE: SIGNIFICANT CHANGE UP
SPECIMEN SOURCE: SIGNIFICANT CHANGE UP

## 2024-12-09 NOTE — PATIENT PROFILE ADULT. - BLOOD AVOIDANCE/RESTRICTIONS, PROFILE
none [Follow - Up] : a follow-up visit [DM Type 2] : DM Type 2 [Hypothyroidism] : hypothyroidism [Thyroid Cancer] : thyroid cancer

## 2025-01-09 NOTE — ED ADULT NURSE NOTE - INTEGUMENTARY WDL
[FreeTextEntry2] : New Patient: Lumbar Spine
[FreeTextEntry2] : New Patient: Lumbar Spine
Color consistent with ethnicity/race, warm, dry intact, resilient.